# Patient Record
Sex: FEMALE | Race: WHITE | Employment: OTHER | URBAN - NONMETROPOLITAN AREA
[De-identification: names, ages, dates, MRNs, and addresses within clinical notes are randomized per-mention and may not be internally consistent; named-entity substitution may affect disease eponyms.]

---

## 2017-02-08 ENCOUNTER — TELEPHONE (OUTPATIENT)
Dept: FAMILY MEDICINE CLINIC | Age: 47
End: 2017-02-08

## 2017-04-11 DIAGNOSIS — F41.9 ANXIETY: ICD-10-CM

## 2017-04-11 RX ORDER — CITALOPRAM 20 MG/1
TABLET ORAL
Qty: 30 TABLET | Refills: 0 | Status: SHIPPED | OUTPATIENT
Start: 2017-04-11 | End: 2017-04-21 | Stop reason: SDUPTHER

## 2017-04-21 ENCOUNTER — OFFICE VISIT (OUTPATIENT)
Dept: FAMILY MEDICINE CLINIC | Age: 47
End: 2017-04-21
Payer: COMMERCIAL

## 2017-04-21 VITALS
SYSTOLIC BLOOD PRESSURE: 126 MMHG | DIASTOLIC BLOOD PRESSURE: 88 MMHG | HEIGHT: 66 IN | WEIGHT: 177 LBS | HEART RATE: 80 BPM | BODY MASS INDEX: 28.45 KG/M2

## 2017-04-21 DIAGNOSIS — Z98.84 S/P GASTRIC BYPASS: ICD-10-CM

## 2017-04-21 DIAGNOSIS — F41.9 ANXIETY: ICD-10-CM

## 2017-04-21 DIAGNOSIS — E78.5 HYPERLIPIDEMIA, UNSPECIFIED HYPERLIPIDEMIA TYPE: ICD-10-CM

## 2017-04-21 DIAGNOSIS — I10 ESSENTIAL HYPERTENSION: Primary | ICD-10-CM

## 2017-04-21 PROCEDURE — 99214 OFFICE O/P EST MOD 30 MIN: CPT | Performed by: PHYSICIAN ASSISTANT

## 2017-04-21 RX ORDER — LISINOPRIL AND HYDROCHLOROTHIAZIDE 12.5; 1 MG/1; MG/1
1 TABLET ORAL DAILY
Qty: 90 TABLET | Refills: 3 | Status: SHIPPED | OUTPATIENT
Start: 2017-04-21 | End: 2017-08-28 | Stop reason: SDUPTHER

## 2017-04-21 RX ORDER — ATORVASTATIN CALCIUM 20 MG/1
20 TABLET, FILM COATED ORAL DAILY
Qty: 30 TABLET | Refills: 11 | Status: CANCELLED | OUTPATIENT
Start: 2017-04-21

## 2017-04-21 RX ORDER — CITALOPRAM 20 MG/1
TABLET ORAL
Qty: 90 TABLET | Refills: 3 | Status: SHIPPED | OUTPATIENT
Start: 2017-04-21 | End: 2017-08-28 | Stop reason: SDUPTHER

## 2017-04-21 ASSESSMENT — PATIENT HEALTH QUESTIONNAIRE - PHQ9
1. LITTLE INTEREST OR PLEASURE IN DOING THINGS: 0
SUM OF ALL RESPONSES TO PHQ QUESTIONS 1-9: 0
SUM OF ALL RESPONSES TO PHQ9 QUESTIONS 1 & 2: 0
2. FEELING DOWN, DEPRESSED OR HOPELESS: 0

## 2017-04-22 ASSESSMENT — ENCOUNTER SYMPTOMS
BLURRED VISION: 0
SHORTNESS OF BREATH: 0

## 2017-04-28 ENCOUNTER — TELEPHONE (OUTPATIENT)
Dept: GENERAL RADIOLOGY | Age: 47
End: 2017-04-28

## 2017-04-28 DIAGNOSIS — Z12.31 VISIT FOR SCREENING MAMMOGRAM: Primary | ICD-10-CM

## 2017-05-03 ENCOUNTER — OFFICE VISIT (OUTPATIENT)
Dept: OBGYN | Age: 47
End: 2017-05-03
Payer: COMMERCIAL

## 2017-05-03 VITALS
HEART RATE: 76 BPM | BODY MASS INDEX: 27.78 KG/M2 | WEIGHT: 177 LBS | RESPIRATION RATE: 16 BRPM | SYSTOLIC BLOOD PRESSURE: 126 MMHG | HEIGHT: 67 IN | DIASTOLIC BLOOD PRESSURE: 80 MMHG

## 2017-05-03 DIAGNOSIS — Z01.419 ENCOUNTER FOR GYNECOLOGICAL EXAMINATION (GENERAL) (ROUTINE) WITHOUT ABNORMAL FINDINGS: Primary | ICD-10-CM

## 2017-05-03 PROCEDURE — 87491 CHLMYD TRACH DNA AMP PROBE: CPT

## 2017-05-03 PROCEDURE — 87591 N.GONORRHOEAE DNA AMP PROB: CPT

## 2017-05-03 PROCEDURE — 99396 PREV VISIT EST AGE 40-64: CPT | Performed by: OBSTETRICS & GYNECOLOGY

## 2017-05-05 LAB
C TRACH DNA GENITAL QL NAA+PROBE: NEGATIVE
N. GONORRHOEAE DNA: NEGATIVE

## 2017-05-07 DIAGNOSIS — R92.8 FOLLOW-UP EXAMINATION OF ABNORMAL MAMMOGRAM: Primary | ICD-10-CM

## 2017-05-26 ENCOUNTER — TELEPHONE (OUTPATIENT)
Dept: GENERAL RADIOLOGY | Age: 47
End: 2017-05-26

## 2017-06-06 ENCOUNTER — OFFICE VISIT (OUTPATIENT)
Dept: SURGERY | Age: 47
End: 2017-06-06
Payer: COMMERCIAL

## 2017-06-06 VITALS
SYSTOLIC BLOOD PRESSURE: 136 MMHG | DIASTOLIC BLOOD PRESSURE: 80 MMHG | HEIGHT: 67 IN | HEART RATE: 88 BPM | BODY MASS INDEX: 27.15 KG/M2 | WEIGHT: 173 LBS

## 2017-06-06 DIAGNOSIS — R92.8 ABNORMAL MAMMOGRAM: Primary | ICD-10-CM

## 2017-06-06 PROCEDURE — 99214 OFFICE O/P EST MOD 30 MIN: CPT | Performed by: SURGERY

## 2017-06-07 ENCOUNTER — HOSPITAL ENCOUNTER (OUTPATIENT)
Age: 47
Setting detail: SPECIMEN
Discharge: HOME | End: 2017-06-07
Payer: COMMERCIAL

## 2017-06-09 LAB — SURGICAL PATHOLOGY REPORT: NORMAL

## 2017-06-22 DIAGNOSIS — R92.8 ABNORMAL MAMMOGRAM OF RIGHT BREAST: Primary | ICD-10-CM

## 2017-07-12 ENCOUNTER — TELEPHONE (OUTPATIENT)
Dept: FAMILY MEDICINE CLINIC | Age: 47
End: 2017-07-12

## 2017-07-12 RX ORDER — TERBINAFINE HYDROCHLORIDE 250 MG/1
250 TABLET ORAL DAILY
Qty: 14 TABLET | Refills: 0 | Status: SHIPPED | OUTPATIENT
Start: 2017-07-12 | End: 2017-07-26

## 2017-08-03 ENCOUNTER — TELEPHONE (OUTPATIENT)
Dept: FAMILY MEDICINE CLINIC | Age: 47
End: 2017-08-03

## 2017-08-28 ENCOUNTER — TELEPHONE (OUTPATIENT)
Dept: FAMILY MEDICINE CLINIC | Age: 47
End: 2017-08-28

## 2017-08-28 ENCOUNTER — OFFICE VISIT (OUTPATIENT)
Dept: PRIMARY CARE CLINIC | Age: 47
End: 2017-08-28
Payer: COMMERCIAL

## 2017-08-28 VITALS
HEIGHT: 67 IN | HEART RATE: 72 BPM | SYSTOLIC BLOOD PRESSURE: 180 MMHG | DIASTOLIC BLOOD PRESSURE: 92 MMHG | BODY MASS INDEX: 27.16 KG/M2 | WEIGHT: 173.06 LBS

## 2017-08-28 DIAGNOSIS — F41.9 ANXIETY: ICD-10-CM

## 2017-08-28 DIAGNOSIS — I10 ESSENTIAL HYPERTENSION: ICD-10-CM

## 2017-08-28 DIAGNOSIS — S93.412A SPRAIN OF CALCANEOFIBULAR LIGAMENT OF LEFT ANKLE, INITIAL ENCOUNTER: Primary | ICD-10-CM

## 2017-08-28 PROCEDURE — 99214 OFFICE O/P EST MOD 30 MIN: CPT | Performed by: FAMILY MEDICINE

## 2017-08-28 RX ORDER — LISINOPRIL AND HYDROCHLOROTHIAZIDE 12.5; 1 MG/1; MG/1
1 TABLET ORAL DAILY
Qty: 90 TABLET | Refills: 3 | Status: SHIPPED | OUTPATIENT
Start: 2017-08-28 | End: 2018-10-10

## 2017-08-28 RX ORDER — CITALOPRAM 20 MG/1
TABLET ORAL
Qty: 90 TABLET | Refills: 3 | Status: SHIPPED | OUTPATIENT
Start: 2017-08-28 | End: 2018-07-17 | Stop reason: ALTCHOICE

## 2017-08-28 ASSESSMENT — ENCOUNTER SYMPTOMS
RESPIRATORY NEGATIVE: 1
GASTROINTESTINAL NEGATIVE: 1
EYES NEGATIVE: 1
ALLERGIC/IMMUNOLOGIC NEGATIVE: 1

## 2017-08-30 ENCOUNTER — TELEPHONE (OUTPATIENT)
Dept: FAMILY MEDICINE CLINIC | Age: 47
End: 2017-08-30

## 2017-09-13 ENCOUNTER — TELEPHONE (OUTPATIENT)
Dept: FAMILY MEDICINE CLINIC | Age: 47
End: 2017-09-13

## 2017-12-22 DIAGNOSIS — R92.8 ABNORMAL MAMMOGRAM OF RIGHT BREAST: Primary | ICD-10-CM

## 2018-03-30 ENCOUNTER — HOSPITAL ENCOUNTER (OUTPATIENT)
Dept: MAMMOGRAPHY | Age: 48
Discharge: HOME | End: 2018-04-01
Payer: COMMERCIAL

## 2018-03-30 DIAGNOSIS — R92.8 ABNORMAL MAMMOGRAM OF RIGHT BREAST: ICD-10-CM

## 2018-03-30 PROCEDURE — 77065 DX MAMMO INCL CAD UNI: CPT

## 2018-04-30 ENCOUNTER — TELEPHONE (OUTPATIENT)
Dept: PRIMARY CARE CLINIC | Age: 48
End: 2018-04-30

## 2018-07-16 ENCOUNTER — TELEPHONE (OUTPATIENT)
Dept: FAMILY MEDICINE CLINIC | Age: 48
End: 2018-07-16

## 2018-07-16 NOTE — TELEPHONE ENCOUNTER
Needs hospital follow up. Went into work today and could not handle it was to [employer] did not give ant time off
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
## 2018-07-17 ENCOUNTER — OFFICE VISIT (OUTPATIENT)
Dept: FAMILY MEDICINE CLINIC | Age: 48
End: 2018-07-17
Payer: COMMERCIAL

## 2018-07-17 ENCOUNTER — HOSPITAL ENCOUNTER (OUTPATIENT)
Dept: LAB | Age: 48
Setting detail: SPECIMEN
Discharge: HOME | End: 2018-07-17
Payer: COMMERCIAL

## 2018-07-17 VITALS
BODY MASS INDEX: 24.96 KG/M2 | DIASTOLIC BLOOD PRESSURE: 80 MMHG | TEMPERATURE: 97 F | OXYGEN SATURATION: 98 % | HEIGHT: 67 IN | WEIGHT: 159 LBS | HEART RATE: 80 BPM | SYSTOLIC BLOOD PRESSURE: 110 MMHG

## 2018-07-17 DIAGNOSIS — J18.9 PNEUMONIA DUE TO INFECTIOUS ORGANISM, UNSPECIFIED LATERALITY, UNSPECIFIED PART OF LUNG: Primary | ICD-10-CM

## 2018-07-17 DIAGNOSIS — J18.9 PNEUMONIA DUE TO INFECTIOUS ORGANISM, UNSPECIFIED LATERALITY, UNSPECIFIED PART OF LUNG: ICD-10-CM

## 2018-07-17 DIAGNOSIS — I48.0 PAROXYSMAL ATRIAL FIBRILLATION (HCC): ICD-10-CM

## 2018-07-17 LAB
ABSOLUTE EOS #: 0 K/UL (ref 0–0.4)
ABSOLUTE IMMATURE GRANULOCYTE: ABNORMAL K/UL (ref 0–0.3)
ABSOLUTE LYMPH #: 2.2 K/UL (ref 1–4.8)
ABSOLUTE MONO #: 1 K/UL (ref 0.1–1.2)
ANION GAP SERPL CALCULATED.3IONS-SCNC: 12 MMOL/L (ref 9–17)
BASOPHILS # BLD: 1 % (ref 0–1)
BASOPHILS ABSOLUTE: 0.1 K/UL (ref 0–0.2)
BUN BLDV-MCNC: 10 MG/DL (ref 6–20)
BUN/CREAT BLD: 14 (ref 9–20)
CALCIUM SERPL-MCNC: 9.6 MG/DL (ref 8.6–10.4)
CHLORIDE BLD-SCNC: 97 MMOL/L (ref 98–107)
CO2: 27 MMOL/L (ref 20–31)
CREAT SERPL-MCNC: 0.71 MG/DL (ref 0.5–0.9)
DIFFERENTIAL TYPE: ABNORMAL
EOSINOPHILS RELATIVE PERCENT: 1 % (ref 1–7)
GFR AFRICAN AMERICAN: >60 ML/MIN
GFR NON-AFRICAN AMERICAN: >60 ML/MIN
GFR SERPL CREATININE-BSD FRML MDRD: ABNORMAL ML/MIN/{1.73_M2}
GFR SERPL CREATININE-BSD FRML MDRD: ABNORMAL ML/MIN/{1.73_M2}
GLUCOSE BLD-MCNC: 102 MG/DL (ref 70–99)
HCT VFR BLD CALC: 38.4 % (ref 36–46)
HEMOGLOBIN: 12.9 G/DL (ref 12–16)
IMMATURE GRANULOCYTES: ABNORMAL %
LYMPHOCYTES # BLD: 27 % (ref 16–46)
MCH RBC QN AUTO: 32.7 PG (ref 26–34)
MCHC RBC AUTO-ENTMCNC: 33.7 G/DL (ref 31–37)
MCV RBC AUTO: 96.9 FL (ref 80–100)
MONOCYTES # BLD: 12 % (ref 4–11)
NRBC AUTOMATED: ABNORMAL PER 100 WBC
PDW BLD-RTO: 14.6 % (ref 11–14.5)
PLATELET # BLD: 496 K/UL (ref 140–450)
PLATELET ESTIMATE: ABNORMAL
PMV BLD AUTO: 7.7 FL (ref 6–12)
POTASSIUM SERPL-SCNC: 4.4 MMOL/L (ref 3.7–5.3)
RBC # BLD: 3.96 M/UL (ref 4–5.2)
RBC # BLD: ABNORMAL 10*6/UL
SEG NEUTROPHILS: 59 % (ref 43–77)
SEGMENTED NEUTROPHILS ABSOLUTE COUNT: 4.8 K/UL (ref 1.8–7.7)
SODIUM BLD-SCNC: 136 MMOL/L (ref 135–144)
WBC # BLD: 8.1 K/UL (ref 3.5–11)
WBC # BLD: ABNORMAL 10*3/UL

## 2018-07-17 PROCEDURE — 85025 COMPLETE CBC W/AUTO DIFF WBC: CPT

## 2018-07-17 PROCEDURE — 80048 BASIC METABOLIC PNL TOTAL CA: CPT

## 2018-07-17 PROCEDURE — 36415 COLL VENOUS BLD VENIPUNCTURE: CPT

## 2018-07-17 PROCEDURE — G0444 DEPRESSION SCREEN ANNUAL: HCPCS | Performed by: PHYSICIAN ASSISTANT

## 2018-07-17 PROCEDURE — 99495 TRANSJ CARE MGMT MOD F2F 14D: CPT | Performed by: PHYSICIAN ASSISTANT

## 2018-07-17 PROCEDURE — 1111F DSCHRG MED/CURRENT MED MERGE: CPT | Performed by: PHYSICIAN ASSISTANT

## 2018-07-17 RX ORDER — CARVEDILOL 6.25 MG/1
6.25 TABLET ORAL 2 TIMES DAILY WITH MEALS
COMMUNITY
Start: 2018-07-13 | End: 2020-11-06

## 2018-07-17 RX ORDER — FLUCONAZOLE 150 MG/1
TABLET ORAL
Qty: 2 TABLET | Refills: 2 | Status: SHIPPED | OUTPATIENT
Start: 2018-07-17 | End: 2018-08-23 | Stop reason: SDUPTHER

## 2018-07-17 RX ORDER — APIXABAN 5 MG/1
TABLET, FILM COATED ORAL
Refills: 0 | COMMUNITY
Start: 2018-07-13 | End: 2019-10-07 | Stop reason: ALTCHOICE

## 2018-07-17 RX ORDER — LISINOPRIL 10 MG/1
10 TABLET ORAL
Refills: 0 | COMMUNITY
Start: 2018-07-13 | End: 2019-07-08 | Stop reason: SDUPTHER

## 2018-07-17 RX ORDER — LEVALBUTEROL INHALATION SOLUTION 0.63 MG/3ML
0.63 SOLUTION RESPIRATORY (INHALATION)
COMMUNITY
Start: 2018-07-13 | End: 2018-10-10

## 2018-07-17 RX ORDER — LEVOFLOXACIN 500 MG/1
750 TABLET, FILM COATED ORAL
COMMUNITY
Start: 2018-07-13 | End: 2018-07-22

## 2018-07-17 ASSESSMENT — PATIENT HEALTH QUESTIONNAIRE - PHQ9
1. LITTLE INTEREST OR PLEASURE IN DOING THINGS: 2
2. FEELING DOWN, DEPRESSED OR HOPELESS: 2
3. TROUBLE FALLING OR STAYING ASLEEP: 2
4. FEELING TIRED OR HAVING LITTLE ENERGY: 3
7. TROUBLE CONCENTRATING ON THINGS, SUCH AS READING THE NEWSPAPER OR WATCHING TELEVISION: 0
SUM OF ALL RESPONSES TO PHQ9 QUESTIONS 1 & 2: 4
8. MOVING OR SPEAKING SO SLOWLY THAT OTHER PEOPLE COULD HAVE NOTICED. OR THE OPPOSITE, BEING SO FIGETY OR RESTLESS THAT YOU HAVE BEEN MOVING AROUND A LOT MORE THAN USUAL: 0
6. FEELING BAD ABOUT YOURSELF - OR THAT YOU ARE A FAILURE OR HAVE LET YOURSELF OR YOUR FAMILY DOWN: 0
9. THOUGHTS THAT YOU WOULD BE BETTER OFF DEAD, OR OF HURTING YOURSELF: 0
SUM OF ALL RESPONSES TO PHQ QUESTIONS 1-9: 11
5. POOR APPETITE OR OVEREATING: 2
10. IF YOU CHECKED OFF ANY PROBLEMS, HOW DIFFICULT HAVE THESE PROBLEMS MADE IT FOR YOU TO DO YOUR WORK, TAKE CARE OF THINGS AT HOME, OR GET ALONG WITH OTHER PEOPLE: 2

## 2018-07-17 NOTE — PROGRESS NOTES
Post-Discharge Transitional Care Management Services      Dada Galarza   YOB: 1970    Date of Office Visit:  7/17/2018  Date of Hospital Admission: 7/4/18  Date of Hospital Discharge: 7/13/18      Care management risk score Rising risk (score 2-5) and Complex Care (Scores >=6): 1       Patient Active Problem List   Diagnosis    Sigmoid diverticulitis    Hypertension    Hyperlipemia    GERD (gastroesophageal reflux disease)    S/P gastric bypass    Anxiety       Allergies   Allergen Reactions    Flagyl [Metronidazole] Nausea Only       Medications listed as ordered at the time of discharge from hospital      Medications marked \"taking\" at this time  Outpatient Prescriptions Marked as Taking for the 7/17/18 encounter (Office Visit) with BHASKAR Zapata   Medication Sig Dispense Refill    levofloxacin (LEVAQUIN) 500 MG tablet Take 750 mg by mouth      lisinopril (PRINIVIL;ZESTRIL) 10 MG tablet Take 10 mg by mouth  0    levalbuterol (XOPENEX) 0.63 MG/3ML nebulization Inhale 0.63 mg into the lungs      ipratropium (ATROVENT) 0.02 % nebulizer solution USE 1 AMPULE IN NEBULIZER THREE TIMES A DAY for 14 days only  0    ELIQUIS 5 MG TABS tablet TAKE 1 TABLET BY MOUTH TWO TIMES A DAY  0    carvedilol (COREG) 6.25 MG tablet Take 6.25 mg by mouth 2 times daily (with meals)       multivitamin (ANIMAL SHAPES) WITH C & FA CHEW chewable tablet Take 1 tablet by mouth daily      Ascorbic Acid (VITAMIN C) 500 MG CAPS Take 1 tablet by mouth daily          Medications patient taking as of now reconciled against medications ordered at time of hospital discharge: Yes    Vitals:    07/17/18 1451   BP: 110/80   Site: Right Arm   Position: Sitting   Cuff Size: Medium Adult   Pulse: 80   Temp: 97 °F (36.1 °C)   TempSrc: Tympanic   SpO2: 98%   Weight: 159 lb (72.1 kg)   Height: 5' 7\" (1.702 m)     Body mass index is 24.9 kg/m².      Wt Readings from Last 3 Encounters:   07/17/18 159 lb (72.1 kg) 08/28/17 173 lb 1 oz (78.5 kg)   06/06/17 173 lb (78.5 kg)     BP Readings from Last 3 Encounters:   07/17/18 110/80   08/28/17 (!) 180/92   06/06/17 136/80        Inpatient course: Discharge summary reviewed- see chart. Chief Complaint   Patient presents with    Follow-Up from Hospital       HPI   Patient is seen in the office for hospital follow-up. Patient was admitted to [facility] 7/4/18-7/8/18 for pneumonia. Patient did not respond to antibiotic therapy. She was intubated and transferred to [facility]. She was in ICU 7/8/18-7/13/18. She extubated on 7/11/18. She had cultures positive for Legionella. She is currently taking Levaquin. Patient admits to significant weakness. She continues to cough. She is compliant with medications. She is trying to maintain incentive spirometry. She developed paroxysmal atrial fibrillation during hospitalization. She is currently taking Coreg and Eliquis. She was changed to plain lisinopril. She is scheduled for follow-up with cardiology next week. She is needing a work note. She denies any additional concerns or complaints today.     Review of Systems   Review of Systems - General ROS: positive for  - fatigue  Psychological ROS: negative  Respiratory ROS: positive for - cough and shortness of breath  Cardiovascular ROS: no chest pain or dyspnea on exertion  Gastrointestinal ROS: no abdominal pain, change in bowel habits, or black or bloody stools    Non face to face  following discharge, date last encounter closed (first attempt may have been earlier): *No documented post hospital discharge outreach found in the last 14 days     Call initiated 2 business days of discharge: *No response recorded in the last 14 days     Interval history/Current status: reviewed      Physical Exam  NC, AT  PERRL  TMs pearly gray  Pharynx moist  RRR  Decreased breath sounds right base  Soft, NT, BS x 4  Extremities without cyanosis or edema      Assessment/Plan:  Lacie Sandhu was seen today for follow-up from hospital.    Diagnoses and all orders for this visit:    Pneumonia due to infectious organism, unspecified laterality, unspecified part of lung  -     WI DISCHARGE MEDS RECONCILED W/ CURRENT OUTPATIENT MED LIST  -     XR CHEST STANDARD (2 VW); Future  -     Basic Metabolic Panel; Future  -     CBC Auto Differential; Future    Paroxysmal atrial fibrillation (HCC)  -     WI DISCHARGE MEDS RECONCILED W/ CURRENT OUTPATIENT MED LIST  -     XR CHEST STANDARD (2 VW); Future  -     Basic Metabolic Panel; Future  -     CBC Auto Differential; Future    Other orders  -     fluconazole (DIFLUCAN) 150 MG tablet; Take 1 po now. Repeat in 72 hours PRN vaginitis.           Medical Decision Making: moderate complexity

## 2018-07-17 NOTE — LETTER
[facility] [address] [place] [address]  Phone: [phone]  Fax: [address] [place], [place]        July 17, 2018     Patient: Carl Ennis   YOB: 1970   Date of Visit: 7/17/2018       To Whom It May Concern: It is my medical opinion that Patricia Zamora may return to work on 7/30/18. If you have any questions or concerns, please don't hesitate to call.     Sincerely,        BHASKAR Shankar

## 2018-07-23 ENCOUNTER — HOSPITAL ENCOUNTER (OUTPATIENT)
Dept: GENERAL RADIOLOGY | Age: 48
Discharge: HOME | End: 2018-07-25
Payer: COMMERCIAL

## 2018-07-23 DIAGNOSIS — I48.0 PAROXYSMAL ATRIAL FIBRILLATION (HCC): ICD-10-CM

## 2018-07-23 DIAGNOSIS — J18.9 PNEUMONIA DUE TO INFECTIOUS ORGANISM, UNSPECIFIED LATERALITY, UNSPECIFIED PART OF LUNG: ICD-10-CM

## 2018-07-23 PROCEDURE — 71046 X-RAY EXAM CHEST 2 VIEWS: CPT

## 2018-07-24 DIAGNOSIS — J18.9 PNEUMONIA DUE TO INFECTIOUS ORGANISM, UNSPECIFIED LATERALITY, UNSPECIFIED PART OF LUNG: Primary | ICD-10-CM

## 2018-08-06 ENCOUNTER — TELEPHONE (OUTPATIENT)
Dept: FAMILY MEDICINE CLINIC | Age: 48
End: 2018-08-06

## 2018-08-06 NOTE — TELEPHONE ENCOUNTER
Wanted [facility] to fill out biometric form for work byt unable to do as we do not have recent blood work

## 2018-08-08 ENCOUNTER — HOSPITAL ENCOUNTER (OUTPATIENT)
Dept: GENERAL RADIOLOGY | Age: 48
Discharge: HOME | End: 2018-08-10
Payer: COMMERCIAL

## 2018-08-08 ENCOUNTER — TELEPHONE (OUTPATIENT)
Dept: FAMILY MEDICINE CLINIC | Age: 48
End: 2018-08-08

## 2018-08-08 ENCOUNTER — TELEPHONE (OUTPATIENT)
Dept: PRIMARY CARE CLINIC | Age: 48
End: 2018-08-08

## 2018-08-08 DIAGNOSIS — J18.9 PNEUMONIA DUE TO INFECTIOUS ORGANISM, UNSPECIFIED LATERALITY, UNSPECIFIED PART OF LUNG: ICD-10-CM

## 2018-08-08 PROCEDURE — 71046 X-RAY EXAM CHEST 2 VIEWS: CPT

## 2018-08-10 ENCOUNTER — OFFICE VISIT (OUTPATIENT)
Dept: PRIMARY CARE CLINIC | Age: 48
End: 2018-08-10
Payer: COMMERCIAL

## 2018-08-10 VITALS
HEIGHT: 67 IN | TEMPERATURE: 98.6 F | BODY MASS INDEX: 25.27 KG/M2 | OXYGEN SATURATION: 100 % | SYSTOLIC BLOOD PRESSURE: 122 MMHG | DIASTOLIC BLOOD PRESSURE: 78 MMHG | HEART RATE: 100 BPM | WEIGHT: 161 LBS

## 2018-08-10 DIAGNOSIS — H10.32 ACUTE BACTERIAL CONJUNCTIVITIS OF LEFT EYE: Primary | ICD-10-CM

## 2018-08-10 PROCEDURE — 99213 OFFICE O/P EST LOW 20 MIN: CPT | Performed by: PHYSICIAN ASSISTANT

## 2018-08-10 RX ORDER — MOXIFLOXACIN 5 MG/ML
1 SOLUTION/ DROPS OPHTHALMIC 3 TIMES DAILY
Qty: 1 BOTTLE | Refills: 0 | Status: SHIPPED | OUTPATIENT
Start: 2018-08-10 | End: 2018-08-17

## 2018-08-10 ASSESSMENT — ENCOUNTER SYMPTOMS
EYE REDNESS: 1
BLURRED VISION: 0
EYE DISCHARGE: 1
RESPIRATORY NEGATIVE: 1

## 2018-08-10 NOTE — PROGRESS NOTES
Subjective:      Patient ID: Stacey Vivar is a 49 y.o. female. Eye Problem    The left eye is affected. This is a new problem. The current episode started today. There is no known exposure to pink eye. She does not wear contacts. Associated symptoms include an eye discharge and eye redness. Pertinent negatives include no blurred vision. Review of Systems   HENT: Negative. Eyes: Positive for discharge and redness. Negative for blurred vision. Respiratory: Negative. Cardiovascular: Negative. Objective:   Physical Exam   Constitutional: She is oriented to person, place, and time. She appears well-developed. HENT:   Head: Normocephalic. Right Ear: External ear normal.   Left Ear: External ear normal.   Eyes: Pupils are equal, round, and reactive to light. Left conjunctiva is injected. Neck: Neck supple. Cardiovascular: Normal rate and regular rhythm. Pulmonary/Chest: Effort normal and breath sounds normal.   Neurological: She is alert and oriented to person, place, and time. Psychiatric: She has a normal mood and affect. Assessment:      1. Acute bacterial conjunctivitis of left eye          Plan:      Vigamox drops. Cool compresses. Good hygiene. Supportive care. Follow-up PRN/as planned with PCP.         BHASKAR Plummer

## 2018-08-23 ENCOUNTER — TELEPHONE (OUTPATIENT)
Dept: FAMILY MEDICINE CLINIC | Age: 48
End: 2018-08-23

## 2018-08-23 RX ORDER — FLUCONAZOLE 150 MG/1
TABLET ORAL
Qty: 2 TABLET | Refills: 2 | Status: SHIPPED | OUTPATIENT
Start: 2018-08-23 | End: 2018-10-10 | Stop reason: SDUPTHER

## 2018-08-23 NOTE — TELEPHONE ENCOUNTER
Pt calling stating she was in the hospital previously on a lot of antibiotics and subsequently had a yeast infection and [facility] had given pt diflucan, pt calling again today stating she has another yeast infection and is requesting another script to be sent to above loaded pharmacy, please advise at above number.

## 2018-10-09 ENCOUNTER — TELEPHONE (OUTPATIENT)
Dept: FAMILY MEDICINE CLINIC | Age: 48
End: 2018-10-09

## 2018-10-10 ENCOUNTER — OFFICE VISIT (OUTPATIENT)
Dept: PRIMARY CARE CLINIC | Age: 48
End: 2018-10-10
Payer: COMMERCIAL

## 2018-10-10 VITALS
RESPIRATION RATE: 16 BRPM | DIASTOLIC BLOOD PRESSURE: 94 MMHG | TEMPERATURE: 96.9 F | HEIGHT: 68 IN | WEIGHT: 170 LBS | HEART RATE: 74 BPM | SYSTOLIC BLOOD PRESSURE: 130 MMHG | OXYGEN SATURATION: 98 % | BODY MASS INDEX: 25.76 KG/M2

## 2018-10-10 DIAGNOSIS — M25.561 PAIN AND SWELLING OF RIGHT KNEE: Primary | ICD-10-CM

## 2018-10-10 DIAGNOSIS — M25.461 PAIN AND SWELLING OF RIGHT KNEE: Primary | ICD-10-CM

## 2018-10-10 PROCEDURE — 99213 OFFICE O/P EST LOW 20 MIN: CPT | Performed by: NURSE PRACTITIONER

## 2018-10-10 RX ORDER — FLUCONAZOLE 150 MG/1
TABLET ORAL
Qty: 3 TABLET | Refills: 0 | Status: SHIPPED | OUTPATIENT
Start: 2018-10-10 | End: 2019-10-07 | Stop reason: ALTCHOICE

## 2018-10-10 RX ORDER — CEPHALEXIN 500 MG/1
500 CAPSULE ORAL 3 TIMES DAILY
Qty: 30 CAPSULE | Refills: 0 | Status: SHIPPED | OUTPATIENT
Start: 2018-10-10 | End: 2019-10-07 | Stop reason: ALTCHOICE

## 2018-10-10 RX ORDER — PREDNISONE 20 MG/1
20 TABLET ORAL 2 TIMES DAILY
Qty: 10 TABLET | Refills: 0 | Status: SHIPPED | OUTPATIENT
Start: 2018-10-10 | End: 2018-10-15

## 2018-10-10 ASSESSMENT — PATIENT HEALTH QUESTIONNAIRE - PHQ9
2. FEELING DOWN, DEPRESSED OR HOPELESS: 0
1. LITTLE INTEREST OR PLEASURE IN DOING THINGS: 0
SUM OF ALL RESPONSES TO PHQ QUESTIONS 1-9: 0
SUM OF ALL RESPONSES TO PHQ9 QUESTIONS 1 & 2: 0
SUM OF ALL RESPONSES TO PHQ QUESTIONS 1-9: 0

## 2018-10-10 ASSESSMENT — ENCOUNTER SYMPTOMS
CONSTIPATION: 0
CHEST TIGHTNESS: 0
VOMITING: 0
COUGH: 0
DIARRHEA: 0
TROUBLE SWALLOWING: 0
NAUSEA: 0
EYES NEGATIVE: 1
ALLERGIC/IMMUNOLOGIC NEGATIVE: 1
ABDOMINAL PAIN: 0
SINUS PRESSURE: 0
SHORTNESS OF BREATH: 0

## 2018-10-19 ENCOUNTER — TELEPHONE (OUTPATIENT)
Dept: MAMMOGRAPHY | Age: 48
End: 2018-10-19

## 2018-10-19 DIAGNOSIS — Z12.31 VISIT FOR SCREENING MAMMOGRAM: Primary | ICD-10-CM

## 2018-10-23 ENCOUNTER — OFFICE VISIT (OUTPATIENT)
Dept: OBGYN | Age: 48
End: 2018-10-23
Payer: COMMERCIAL

## 2018-10-23 ENCOUNTER — HOSPITAL ENCOUNTER (OUTPATIENT)
Age: 48
Setting detail: SPECIMEN
Discharge: HOME | End: 2018-10-23
Payer: COMMERCIAL

## 2018-10-23 ENCOUNTER — HOSPITAL ENCOUNTER (OUTPATIENT)
Dept: MAMMOGRAPHY | Age: 48
Discharge: HOME | End: 2018-10-25
Payer: COMMERCIAL

## 2018-10-23 VITALS
TEMPERATURE: 97.4 F | WEIGHT: 171 LBS | HEART RATE: 85 BPM | SYSTOLIC BLOOD PRESSURE: 110 MMHG | DIASTOLIC BLOOD PRESSURE: 80 MMHG | HEIGHT: 68 IN | BODY MASS INDEX: 25.91 KG/M2

## 2018-10-23 DIAGNOSIS — Z12.31 VISIT FOR SCREENING MAMMOGRAM: ICD-10-CM

## 2018-10-23 DIAGNOSIS — Z12.4 SCREENING FOR CERVICAL CANCER: ICD-10-CM

## 2018-10-23 DIAGNOSIS — Z01.419 WELL FEMALE EXAM WITH ROUTINE GYNECOLOGICAL EXAM: Primary | ICD-10-CM

## 2018-10-23 DIAGNOSIS — N89.8 VAGINAL IRRITATION: ICD-10-CM

## 2018-10-23 PROCEDURE — 87480 CANDIDA DNA DIR PROBE: CPT

## 2018-10-23 PROCEDURE — 99396 PREV VISIT EST AGE 40-64: CPT | Performed by: NURSE PRACTITIONER

## 2018-10-23 PROCEDURE — 87510 GARDNER VAG DNA DIR PROBE: CPT

## 2018-10-23 PROCEDURE — G0145 SCR C/V CYTO,THINLAYER,RESCR: HCPCS

## 2018-10-23 PROCEDURE — 77063 BREAST TOMOSYNTHESIS BI: CPT

## 2018-10-23 PROCEDURE — 87660 TRICHOMONAS VAGIN DIR PROBE: CPT

## 2018-10-23 NOTE — PROGRESS NOTES
SURGERY      TUBAL LIGATION Bilateral 2009    WISDOM TOOTH EXTRACTION       Family History   Problem Relation Age of Onset    Endometrial Cancer Mother     Diabetes Father     High Blood Pressure Brother      Social History     Social History    Marital status:      Spouse name: N/A    Number of children: N/A    Years of education: N/A     Occupational History     [address]     Social History Main Topics    Smoking status: Never Smoker    Smokeless tobacco: Never Used    Alcohol use 1.8 oz/week     3 Glasses of wine per week      Comment: occasional    Drug use: No    Sexual activity: Yes     Partners: Male      Comment:  8 years. NO dyspareunia or PCB     Other Topics Concern    Not on file     Social History Narrative    No narrative on file       MEDICATIONS:  Current Outpatient Prescriptions   Medication Sig Dispense Refill    cephALEXin (KEFLEX) 500 MG capsule Take 1 capsule by mouth 3 times daily 30 capsule 0    fluconazole (DIFLUCAN) 150 MG tablet Take one table every 2 days for 3 doses while on ATB 3 tablet 0    lisinopril (PRINIVIL;ZESTRIL) 10 MG tablet Take 10 mg by mouth  0    BIOTIN 5000 PO Take 1 tablet by mouth daily      Probiotic Product (PROBIOTIC DAILY PO) Take 1 tablet by mouth daily      multivitamin (ANIMAL SHAPES) WITH C & FA CHEW chewable tablet Take 1 tablet by mouth daily      Ascorbic Acid (VITAMIN C) 500 MG CAPS Take 1 tablet by mouth daily      CALCIUM CITRATE-VITAMIN D3 PO Take 1 tablet by mouth 2 times daily      ELIQUIS 5 MG TABS tablet TAKE 1 TABLET BY MOUTH TWO TIMES A DAY  0    carvedilol (COREG) 6.25 MG tablet Take 6.25 mg by mouth 2 times daily (with meals)        No current facility-administered medications for this visit.         ALLERGIES:  Allergies as of 10/23/2018 - Review Complete 10/23/2018   Allergen Reaction Noted    Codeine Anxiety 11/26/2013    Flagyl [metronidazole] Nausea Only 07/03/2014           Gynecologic for screening mammogram  TYLER DIGITAL SCREEN W CAD BILATERAL   4. Vaginal irritation  VAGINITIS DNA PROBE        Chief Complaint   Patient presents with    Gynecologic Exam     Pap/pelvic          Past Medical History:   Diagnosis Date    Anxiety     GERD (gastroesophageal reflux disease)     History of pneumonia 07/2018    Legionella requiring intubation    Hyperlipemia     Hypertension     Obesity     Paroxysmal atrial fibrillation ([facility] [address] 07/2018    during pneumonia hospitalization         Patient Active Problem List   Diagnosis    Sigmoid diverticulitis    Hypertension    Hyperlipemia    GERD (gastroesophageal reflux disease)    S/P gastric bypass    Anxiety          Hereditary Breast, Ovarian, Colon and Uterine Cancer screening Done. Tobacco & Secondary smoke risks reviewed; instructed on cessation and avoidance    PLAN:  Return in about 1 year (around 10/23/2019) for Annual Exam.  Repeat pap per ASCCP 2013 guidelines  Return for annual exams  Mammograms every 1 year. If 34 yo and last mammogram was negative. Routine health maintenance per patients PCP. Orders Placed This Encounter   Procedures    VAGINITIS DNA PROBE     Standing Status:   Future     Standing Expiration Date:   11/23/2018    TYLER DIGITAL SCREEN W CAD BILATERAL     Standing Status:   Future     Standing Expiration Date:   6/14/2020     Scheduling Instructions: On the day of the exam, the patient should not wear deodorant, lotion, or powder on the breast or underarm area. Wear a two piece outfit and be prepared to give information about prior breast procedures including mammograms, biopsies, or surgeries. If you've had mammograms done elsewhere, please request any previous mammogram films from those organizations prior to your appointment.      Order Specific Question:   Reason for exam:     Answer:   SCREENING MAMMOGRAM    PAP SMEAR     Standing Status:   Future     Standing Expiration Date:   12/23/2018

## 2018-10-24 LAB
DIRECT EXAM: ABNORMAL
Lab: ABNORMAL
SPECIMEN DESCRIPTION: ABNORMAL
STATUS: ABNORMAL

## 2018-10-25 DIAGNOSIS — B96.89 BV (BACTERIAL VAGINOSIS): Primary | ICD-10-CM

## 2018-10-25 DIAGNOSIS — N76.0 BV (BACTERIAL VAGINOSIS): Primary | ICD-10-CM

## 2018-10-25 RX ORDER — CLINDAMYCIN PHOSPHATE 20 MG/G
CREAM VAGINAL
Qty: 1 TUBE | Refills: 0 | Status: SHIPPED | OUTPATIENT
Start: 2018-10-25 | End: 2019-10-07 | Stop reason: ALTCHOICE

## 2018-11-08 LAB — CYTOLOGY REPORT: NORMAL

## 2019-07-08 DIAGNOSIS — I10 ESSENTIAL HYPERTENSION: Primary | ICD-10-CM

## 2019-07-08 RX ORDER — LISINOPRIL 10 MG/1
10 TABLET ORAL DAILY
Qty: 30 TABLET | Refills: 0 | Status: SHIPPED | OUTPATIENT
Start: 2019-07-08 | End: 2019-07-10 | Stop reason: SDUPTHER

## 2019-07-10 DIAGNOSIS — I10 ESSENTIAL HYPERTENSION: ICD-10-CM

## 2019-07-10 RX ORDER — LISINOPRIL 10 MG/1
10 TABLET ORAL DAILY
Qty: 30 TABLET | Refills: 0 | Status: SHIPPED | OUTPATIENT
Start: 2019-07-10 | End: 2019-08-27 | Stop reason: SDUPTHER

## 2019-08-13 ENCOUNTER — TELEPHONE (OUTPATIENT)
Dept: FAMILY MEDICINE CLINIC | Age: 49
End: 2019-08-13

## 2019-08-13 DIAGNOSIS — Z00.00 WELL ADULT EXAM: ICD-10-CM

## 2019-08-13 DIAGNOSIS — Z13.29 SCREENING FOR THYROID DISORDER: ICD-10-CM

## 2019-08-13 DIAGNOSIS — Z13.220 SCREENING, LIPID: Primary | ICD-10-CM

## 2019-08-27 ENCOUNTER — TELEPHONE (OUTPATIENT)
Dept: FAMILY MEDICINE CLINIC | Age: 49
End: 2019-08-27

## 2019-08-27 DIAGNOSIS — I10 ESSENTIAL HYPERTENSION: ICD-10-CM

## 2019-08-27 RX ORDER — LISINOPRIL 10 MG/1
10 TABLET ORAL DAILY
Qty: 30 TABLET | Refills: 1 | Status: SHIPPED | OUTPATIENT
Start: 2019-08-27 | End: 2019-10-07 | Stop reason: ALTCHOICE

## 2019-08-27 NOTE — TELEPHONE ENCOUNTER
Pt calling stating she has an appt on Friday, did not get her labs done last Saturday as she was sick, offered to pt to fax her lab orders since she states she works in [employer] and can't get here to do labs prior to appt, do you still want pt to keep appt Friday or reschedule, please advise at above number.

## 2019-09-28 ENCOUNTER — HOSPITAL ENCOUNTER (OUTPATIENT)
Dept: LAB | Age: 49
Discharge: HOME | End: 2019-09-28
Payer: COMMERCIAL

## 2019-09-28 DIAGNOSIS — Z13.220 SCREENING, LIPID: ICD-10-CM

## 2019-09-28 DIAGNOSIS — Z00.00 WELL ADULT EXAM: ICD-10-CM

## 2019-09-28 DIAGNOSIS — Z13.29 SCREENING FOR THYROID DISORDER: ICD-10-CM

## 2019-09-28 LAB
ALBUMIN SERPL-MCNC: 4.8 G/DL (ref 3.5–5.2)
ALBUMIN/GLOBULIN RATIO: 1.8 (ref 1–2.5)
ALP BLD-CCNC: 45 U/L (ref 35–104)
ALT SERPL-CCNC: 31 U/L (ref 5–33)
ANION GAP SERPL CALCULATED.3IONS-SCNC: 13 MMOL/L (ref 9–17)
AST SERPL-CCNC: 60 U/L
BILIRUB SERPL-MCNC: 0.59 MG/DL (ref 0.3–1.2)
BUN BLDV-MCNC: 4 MG/DL (ref 6–20)
BUN/CREAT BLD: 7 (ref 9–20)
CALCIUM SERPL-MCNC: 9.3 MG/DL (ref 8.6–10.4)
CHLORIDE BLD-SCNC: 93 MMOL/L (ref 98–107)
CHOLESTEROL/HDL RATIO: 1.9
CHOLESTEROL: 243 MG/DL
CO2: 25 MMOL/L (ref 20–31)
CREAT SERPL-MCNC: 0.55 MG/DL (ref 0.5–0.9)
GFR AFRICAN AMERICAN: >60 ML/MIN
GFR NON-AFRICAN AMERICAN: >60 ML/MIN
GFR SERPL CREATININE-BSD FRML MDRD: ABNORMAL ML/MIN/{1.73_M2}
GFR SERPL CREATININE-BSD FRML MDRD: ABNORMAL ML/MIN/{1.73_M2}
GLUCOSE BLD-MCNC: 95 MG/DL (ref 70–99)
HDLC SERPL-MCNC: 127 MG/DL
LDL CHOLESTEROL: 104 MG/DL (ref 0–130)
POTASSIUM SERPL-SCNC: 4.2 MMOL/L (ref 3.7–5.3)
SODIUM BLD-SCNC: 131 MMOL/L (ref 135–144)
TOTAL PROTEIN: 7.5 G/DL (ref 6.4–8.3)
TRIGL SERPL-MCNC: 58 MG/DL
TSH SERPL DL<=0.05 MIU/L-ACNC: 0.94 MIU/L (ref 0.3–5)
VLDLC SERPL CALC-MCNC: ABNORMAL MG/DL (ref 1–30)

## 2019-09-28 PROCEDURE — 80061 LIPID PANEL: CPT

## 2019-09-28 PROCEDURE — 36415 COLL VENOUS BLD VENIPUNCTURE: CPT

## 2019-09-28 PROCEDURE — 80053 COMPREHEN METABOLIC PANEL: CPT

## 2019-09-28 PROCEDURE — 84443 ASSAY THYROID STIM HORMONE: CPT

## 2019-09-30 ENCOUNTER — TELEPHONE (OUTPATIENT)
Dept: PRIMARY CARE CLINIC | Age: 49
End: 2019-09-30

## 2019-09-30 DIAGNOSIS — I10 ESSENTIAL HYPERTENSION: ICD-10-CM

## 2019-09-30 RX ORDER — LISINOPRIL 10 MG/1
10 TABLET ORAL DAILY
Qty: 30 TABLET | Refills: 1 | OUTPATIENT
Start: 2019-09-30

## 2019-09-30 NOTE — TELEPHONE ENCOUNTER
----- Message from Sergey Blanca, [place] sent at 9/30/2019  8:47 AM EDT -----  Sodium minimally low. Encourage daily Gatorade. Normal thyroid studies. Stable lipids.

## 2019-10-07 ENCOUNTER — OFFICE VISIT (OUTPATIENT)
Dept: FAMILY MEDICINE CLINIC | Age: 49
End: 2019-10-07
Payer: COMMERCIAL

## 2019-10-07 VITALS
HEIGHT: 68 IN | DIASTOLIC BLOOD PRESSURE: 98 MMHG | WEIGHT: 172 LBS | HEART RATE: 76 BPM | BODY MASS INDEX: 26.07 KG/M2 | SYSTOLIC BLOOD PRESSURE: 152 MMHG

## 2019-10-07 DIAGNOSIS — I10 ESSENTIAL HYPERTENSION: ICD-10-CM

## 2019-10-07 PROCEDURE — 99213 OFFICE O/P EST LOW 20 MIN: CPT | Performed by: PHYSICIAN ASSISTANT

## 2019-10-07 RX ORDER — LISINOPRIL AND HYDROCHLOROTHIAZIDE 12.5; 1 MG/1; MG/1
1 TABLET ORAL DAILY
Qty: 90 TABLET | Refills: 3 | Status: SHIPPED | OUTPATIENT
Start: 2019-10-07 | End: 2020-10-20 | Stop reason: SDUPTHER

## 2019-10-07 RX ORDER — LISINOPRIL 10 MG/1
10 TABLET ORAL DAILY
Qty: 90 TABLET | Refills: 1 | Status: CANCELLED | OUTPATIENT
Start: 2019-10-07

## 2019-10-07 ASSESSMENT — PATIENT HEALTH QUESTIONNAIRE - PHQ9
1. LITTLE INTEREST OR PLEASURE IN DOING THINGS: 0
SUM OF ALL RESPONSES TO PHQ QUESTIONS 1-9: 0
SUM OF ALL RESPONSES TO PHQ9 QUESTIONS 1 & 2: 0
2. FEELING DOWN, DEPRESSED OR HOPELESS: 0
SUM OF ALL RESPONSES TO PHQ QUESTIONS 1-9: 0

## 2019-10-07 ASSESSMENT — ENCOUNTER SYMPTOMS
RESPIRATORY NEGATIVE: 1
BLURRED VISION: 0

## 2020-01-31 ENCOUNTER — TELEPHONE (OUTPATIENT)
Dept: OBGYN | Age: 50
End: 2020-01-31

## 2020-03-17 ENCOUNTER — TELEPHONE (OUTPATIENT)
Dept: OBGYN | Age: 50
End: 2020-03-17

## 2020-03-17 NOTE — LETTER
[address] OB GYN A department of [place] [address]  Phone: [phone]  Fax: [phone]    Solon Rinne, APRN - CNP        March 17, 2020     [place] [address] [address]      Dear Dion : This letter is a reminder that your Mammogram test is due. Please call our office to schedule an appointment. If you've already underwent or scheduled this procedure, please disregard this notice.     Sincerely,        Solon Rinne, APRN - CNP

## 2020-04-28 RX ORDER — LISINOPRIL AND HYDROCHLOROTHIAZIDE 12.5; 1 MG/1; MG/1
1 TABLET ORAL DAILY
Qty: 90 TABLET | Refills: 3 | OUTPATIENT
Start: 2020-04-28

## 2020-06-29 ENCOUNTER — TELEPHONE (OUTPATIENT)
Dept: OBGYN | Age: 50
End: 2020-06-29

## 2020-08-05 ENCOUNTER — TELEPHONE (OUTPATIENT)
Dept: OBGYN | Age: 50
End: 2020-08-05

## 2020-10-20 ENCOUNTER — TELEPHONE (OUTPATIENT)
Dept: FAMILY MEDICINE CLINIC | Age: 50
End: 2020-10-20

## 2020-10-20 RX ORDER — LISINOPRIL AND HYDROCHLOROTHIAZIDE 12.5; 1 MG/1; MG/1
1 TABLET ORAL DAILY
Qty: 90 TABLET | Refills: 0 | Status: SHIPPED | OUTPATIENT
Start: 2020-10-20 | End: 2021-01-05

## 2020-10-20 RX ORDER — HYDROCHLOROTHIAZIDE 12.5 MG/1
TABLET ORAL
Qty: 90 TABLET | Refills: 0 | OUTPATIENT
Start: 2020-10-20

## 2020-10-20 RX ORDER — LISINOPRIL 10 MG/1
TABLET ORAL
Qty: 90 TABLET | Refills: 0 | OUTPATIENT
Start: 2020-10-20

## 2020-11-06 ENCOUNTER — OFFICE VISIT (OUTPATIENT)
Dept: PRIMARY CARE CLINIC | Age: 50
End: 2020-11-06

## 2020-11-06 VITALS
OXYGEN SATURATION: 97 % | BODY MASS INDEX: 30.71 KG/M2 | SYSTOLIC BLOOD PRESSURE: 110 MMHG | WEIGHT: 202.6 LBS | DIASTOLIC BLOOD PRESSURE: 82 MMHG | TEMPERATURE: 97.4 F | HEART RATE: 104 BPM | HEIGHT: 68 IN

## 2020-11-06 PROCEDURE — 99213 OFFICE O/P EST LOW 20 MIN: CPT | Performed by: FAMILY MEDICINE

## 2020-11-06 PROCEDURE — 99212 OFFICE O/P EST SF 10 MIN: CPT

## 2020-11-06 RX ORDER — SULFAMETHOXAZOLE AND TRIMETHOPRIM 800; 160 MG/1; MG/1
1 TABLET ORAL 2 TIMES DAILY
Qty: 14 TABLET | Refills: 0 | Status: SHIPPED | OUTPATIENT
Start: 2020-11-06 | End: 2020-11-13

## 2020-11-06 ASSESSMENT — PATIENT HEALTH QUESTIONNAIRE - PHQ9
SUM OF ALL RESPONSES TO PHQ9 QUESTIONS 1 & 2: 0
SUM OF ALL RESPONSES TO PHQ QUESTIONS 1-9: 0
SUM OF ALL RESPONSES TO PHQ QUESTIONS 1-9: 0
1. LITTLE INTEREST OR PLEASURE IN DOING THINGS: 0
2. FEELING DOWN, DEPRESSED OR HOPELESS: 0
SUM OF ALL RESPONSES TO PHQ QUESTIONS 1-9: 0

## 2020-11-06 ASSESSMENT — ENCOUNTER SYMPTOMS: COLOR CHANGE: 1

## 2020-11-06 NOTE — LETTER
[address] Ur[facility] A department of [facility]  Phone: [phone]  Fax: [phone] Dashawn Ding, DO        November 6, 2020     Patient: Sincere Farr   YOB: 1970   Date of Visit: 11/6/2020       To Whom it May Concern:    Renée Nunes was seen in my clinic on 11/6/2020. Please excuse her for this appointment today. If you have any questions or concerns, please don't hesitate to call.     Sincerely,         Doreen Ordonez, DO

## 2020-11-06 NOTE — PROGRESS NOTES
mouth daily      Ascorbic Acid (VITAMIN C) 500 MG CAPS Take 1 tablet by mouth daily      CALCIUM CITRATE-VITAMIN D3 PO Take 1 tablet by mouth 2 times daily       No current facility-administered medications for this visit. Allergies   Allergen Reactions    Codeine Anxiety    Flagyl [Metronidazole] Nausea Only       Review of Systems   Constitutional: Negative for fever. Musculoskeletal: Positive for arthralgias (R great toe) and gait problem (limping due to pain). Skin: Positive for color change and wound (draining yellow drainage). Neurological: Negative for tingling and numbness. Objective:     Vitals:    11/06/20 0955   BP: 110/82   Pulse: 104   Temp: 97.4 °F (36.3 °C)   TempSrc: Tympanic   SpO2: 97%   Weight: 202 lb 9.6 oz (91.9 kg)   Height: 5' 8\" (1.727 m)     Physical Exam  Constitutional:       General: She is not in acute distress. Appearance: Normal appearance. Cardiovascular:      Rate and Rhythm: Normal rate. Feet:      Comments: Paronychia noted over R great toenail with significant tenderness and erythema  Skin:     General: Skin is warm and dry. Neurological:      Mental Status: She is alert. Assessment:       Diagnosis Orders   1. Paronychia of great toe of right foot  sulfamethoxazole-trimethoprim (BACTRIM DS;SEPTRA DS) 800-160 MG per tablet   2. Ingrowing toenail with infection  sulfamethoxazole-trimethoprim (BACTRIM DS;SEPTRA DS) 800-160 MG per tablet       Plan:      Return if symptoms worsen or fail to improve in 3-4 days. No orders of the defined types were placed in this encounter. Orders Placed This Encounter   Medications    sulfamethoxazole-trimethoprim (BACTRIM DS;SEPTRA DS) 800-160 MG per tablet     Sig: Take 1 tablet by mouth 2 times daily for 7 days     Dispense:  14 tablet     Refill:  0       Patient given educational materials - see patient instructions. Discussed use, benefit, and side effects of prescribed medications.   All patient questions answered. Pt voiced understanding.        Electronically signed by Darin Dickinson DO on 11/16/2020 at 12:08 AM

## 2020-11-06 NOTE — PATIENT INSTRUCTIONS
Patient Education        Paronychia: Care Instructions  Your Care Instructions  Paronychia (say \"rhup-tf-HZ-joel-uh\") is an infection of the skin around a fingernail or toenail. It happens when germs enter through a break in the skin. The doctor may have made a small cut in the infected area to drain the pus. Most cases of paronychia improve in a few days. But watch your symptoms and follow your doctor's advice. Though rare, a mild case can turn into something more serious and infect your entire finger or toe. Also, it is possible for an infection to return. Follow-up care is a key part of your treatment and safety. Be sure to make and go to all appointments, and call your doctor if you are having problems. It's also a good idea to know your test results and keep a list of the medicines you take. How can you care for yourself at home? · If your doctor told you how to care for your infected nail, follow the doctor's instructions. If you did not get instructions, follow this general advice:  ? Wash the area with clean water 2 times a day. Don't use hydrogen peroxide or alcohol, which can slow healing. ? You may cover the area with a thin layer of petroleum jelly, such as Vaseline, and a nonstick bandage. ? Apply more petroleum jelly and replace the bandage as needed. · If your doctor prescribed antibiotics, take them as directed. Do not stop taking them just because you feel better. You need to take the full course of antibiotics. · Take an over-the-counter pain medicine, such as acetaminophen (Tylenol), ibuprofen (Advil, Motrin), or naproxen (Aleve). Read and follow all instructions on the label. · Do not take two or more pain medicines at the same time unless the doctor told you to. Many pain medicines have acetaminophen, which is Tylenol. Too much acetaminophen (Tylenol) can be harmful. · Prop up the toe or finger so that it is higher than the level of your heart.  This will help with pain and swelling. · Apply heat. Put a warm water bottle, heating pad set on low, or warm cloth on your finger or toe. Do not go to sleep with a heating pad on your skin. · Soak the area in warm water twice a day for 15 minutes each time. After soaking, dry the area well and apply a thin layer of petroleum jelly, such as Vaseline. Put on a new bandage. When should you call for help? Call your doctor now or seek immediate medical care if:    · You have signs of new or worsening infection, such as:  ? Increased pain, swelling, warmth, or redness. ? Red streaks leading from the infected skin. ? Pus draining from the area. ? A fever. Watch closely for changes in your health, and be sure to contact your doctor if:    · You do not get better as expected. Where can you learn more? Go to https://SocialbombpeBaobab Planeteweb.Green & Pleasant. org and sign in to your Dahu account. Enter [id number] [id number] [phone] in the Metrigo box to learn more about \"Paronychia: Care Instructions. \"     If you do not have an account, please click on the \"Sign Up Now\" link. Current as of: July 2, 2020               Content Version: 12.6  © 9238-6060 [employer]. Care instructions adapted under license by [facility]"[employer] [facility] ([facility]). If you have questions about a medical condition or this instruction, always ask your healthcare professional. [place] [address] any warranty or liability for your use of this information. Patient Education        Ingrown Toenail: Care Instructions  Your Care Instructions     An ingrown toenail often occurs because a nail is not trimmed correctly or because shoes are too tight. An ingrown nail can cause an infection. If your toe is infected, your doctor may prescribe antibiotics. Most ingrown toenails can be treated at home. You should trim toenails straight across, so the ends of the nail grow over the skin and not into it. Good nail care can prevent ingrown toenails.   Follow-up care is a key part of your treatment and safety. Be sure to make and go to all appointments, and call your doctor if you are having problems. It's also a good idea to know your test results and keep a list of the medicines you take. How can you care for yourself at home? · Trim the nails straight across. Leave the corners a little longer so they do not cut into the skin. To do this when you have an ingrown nail:  ? Soak your foot in warm water for about 15 minutes to soften the nail. ? Wedge a small piece of wet cotton under the corner of the nail to cushion the nail and lift it slightly. This keeps it from cutting the skin. ? Repeat daily until the nail has grown out and can be trimmed. · Do not use manicure scissors to dig under the ingrown nail. You might stab your toe, which could get infected. · Do not trim your toenails too short. · Check with your doctor before trimming your own toenails if you have been diagnosed with diabetes or peripheral arterial disease. These conditions increase the risk of an infection, because you may have decreased sensation in your toes and cut yourself without knowing it. · Wear roomy, comfortable shoes. · If your doctor prescribed antibiotics, take them as directed. Do not stop taking them just because you feel better. You need to take the full course of antibiotics. When should you call for help? Call your doctor now or seek immediate medical care if:    · You have signs of infection, such as:  ? Increased pain, swelling, warmth, or redness. ? Red streaks leading from the toe. ? Pus draining from the toe. ? A fever. Watch closely for changes in your health, and be sure to contact your doctor if:    · You do not get better as expected. Where can you learn more? Go to https://Ribbonpeseeweb.Cuiker. org and sign in to your Zhima Tech account. Enter R135 in the LoopIt box to learn more about \"Ingrown Toenail: Care Instructions. \"     If you do not have an account, please click on the \"Sign Up Now\" link. Current as of: July 2, 2020               Content Version: 12.6  © 2006-2020 [employer]. Care instructions adapted under license by [facility] ([facility]). If you have questions about a medical condition or this instruction, always ask your healthcare professional. [address] any warranty or liability for your use of this information.

## 2021-01-03 ENCOUNTER — TELEPHONE (OUTPATIENT)
Dept: INTERNAL MEDICINE | Age: 51
End: 2021-01-03

## 2021-01-03 NOTE — TELEPHONE ENCOUNTER
Received call from Carmen De Guzman at [facility] while on call. Per nurse, patient has order for ativan 1 mg PO BID PRN, however, she did not have any on hand as she was a new admit, and needed the order clarified with the on call pharmacy. Per nurse, patient had above mentioned order approved by Dr. Pretty Rodriguez 01/01/20, but did not have the medication on site. Called Crystal at on call pharmacy, and gave a verbal order for ativan 1 mg PO BID PRN.

## 2021-01-05 ENCOUNTER — HOSPITAL ENCOUNTER (EMERGENCY)
Age: 51
Discharge: HOME | End: 2021-01-05
Attending: EMERGENCY MEDICINE
Payer: MEDICAID

## 2021-01-05 VITALS
TEMPERATURE: 98.9 F | RESPIRATION RATE: 15 BRPM | OXYGEN SATURATION: 98 % | DIASTOLIC BLOOD PRESSURE: 81 MMHG | SYSTOLIC BLOOD PRESSURE: 106 MMHG | HEART RATE: 85 BPM

## 2021-01-05 DIAGNOSIS — T78.40XA ALLERGIC REACTION, INITIAL ENCOUNTER: Primary | ICD-10-CM

## 2021-01-05 PROCEDURE — 99284 EMERGENCY DEPT VISIT MOD MDM: CPT

## 2021-01-05 PROCEDURE — 6370000000 HC RX 637 (ALT 250 FOR IP): Performed by: EMERGENCY MEDICINE

## 2021-01-05 RX ORDER — GAUZE BANDAGE 2" X 2"
100 BANDAGE TOPICAL DAILY
COMMUNITY

## 2021-01-05 RX ORDER — HEPARIN SODIUM 5000 [USP'U]/ML
5000 INJECTION, SOLUTION INTRAVENOUS; SUBCUTANEOUS 3 TIMES DAILY
COMMUNITY
End: 2021-01-12 | Stop reason: ALTCHOICE

## 2021-01-05 RX ORDER — EPINEPHRINE 0.3 MG/.3ML
0.3 INJECTION SUBCUTANEOUS ONCE
Qty: 2 EACH | Refills: 0 | Status: SHIPPED | OUTPATIENT
Start: 2021-01-05 | End: 2021-01-05

## 2021-01-05 RX ORDER — SPIRONOLACTONE 25 MG/1
12.5 TABLET ORAL DAILY
COMMUNITY

## 2021-01-05 RX ORDER — FAMOTIDINE 20 MG/1
20 TABLET, FILM COATED ORAL ONCE
Status: COMPLETED | OUTPATIENT
Start: 2021-01-05 | End: 2021-01-05

## 2021-01-05 RX ORDER — PREDNISONE 20 MG/1
60 TABLET ORAL ONCE
Status: COMPLETED | OUTPATIENT
Start: 2021-01-05 | End: 2021-01-05

## 2021-01-05 RX ORDER — SULFAMETHOXAZOLE AND TRIMETHOPRIM 800; 160 MG/1; MG/1
1 TABLET ORAL 2 TIMES DAILY
COMMUNITY
End: 2021-01-12 | Stop reason: ALTCHOICE

## 2021-01-05 RX ORDER — METOPROLOL SUCCINATE 50 MG/1
50 TABLET, EXTENDED RELEASE ORAL DAILY
COMMUNITY

## 2021-01-05 RX ORDER — DIPHENHYDRAMINE HCL 25 MG
50 TABLET ORAL ONCE
Status: COMPLETED | OUTPATIENT
Start: 2021-01-05 | End: 2021-01-05

## 2021-01-05 RX ORDER — PREDNISONE 10 MG/1
TABLET ORAL
Qty: 20 TABLET | Refills: 0 | Status: SHIPPED | OUTPATIENT
Start: 2021-01-05 | End: 2021-01-12 | Stop reason: ALTCHOICE

## 2021-01-05 RX ORDER — QUETIAPINE FUMARATE 50 MG/1
50 TABLET, FILM COATED ORAL NIGHTLY
COMMUNITY

## 2021-01-05 RX ORDER — LORAZEPAM 1 MG/1
1 TABLET ORAL EVERY 12 HOURS PRN
COMMUNITY

## 2021-01-05 RX ORDER — FLUOXETINE HYDROCHLORIDE 20 MG/1
20 CAPSULE ORAL DAILY
COMMUNITY

## 2021-01-05 RX ADMIN — DIPHENHYDRAMINE HYDROCHLORIDE 50 MG: 25 TABLET ORAL at 14:20

## 2021-01-05 RX ADMIN — FAMOTIDINE 20 MG: 20 TABLET, FILM COATED ORAL at 14:20

## 2021-01-05 RX ADMIN — PREDNISONE 60 MG: 20 TABLET ORAL at 14:20

## 2021-01-05 ASSESSMENT — ENCOUNTER SYMPTOMS
COUGH: 0
SHORTNESS OF BREATH: 0

## 2021-01-05 NOTE — ED PROVIDER NOTES
[facility] ED  [address]  [place] [address] [place]  Phone: [phone]        Pt Name: Chinedu Fuentes  MRN: [id number]  Mil[place]ro 1970  Date of evaluation: 1/5/21      CHIEF COMPLAINT     Chief Complaint   Patient presents with    Allergic Reaction         HISTORY OF PRESENT ILLNESS  (Location/Symptom, Timing/Onset, Context/Setting, Quality, Duration, Modifying Factors, Severity.)    Chinedu Fuentes is a 45 y.o. female who presents for possible allergic reaction. Was eating lunch and developed rash. No difficulty breathing. Currently on Bactrim for a toe infection scheduled to end 1/8/21. No other new medications. Patient in [facility] for etoh abuse and wernicke's encephalopathy placed earlier this year. Also started seroquel and prozac. Already taken off of Lisinopril for possible allergic reactionn. REVIEW OF SYSTEMS    (2-9 systems for level 4, 10 or more for level 5)     Review of Systems   Respiratory: Negative for cough and shortness of breath. Skin: Positive for rash. PAST MEDICAL HISTORY    has a past medical history of Anxiety, GERD (gastroesophageal reflux disease), History of pneumonia, Hyperlipemia, Hypertension, Obesity, and Paroxysmal atrial fibrillation ([facility] [address] SURGICAL HISTORY      has a past surgical history that includes Knee arthroscopy (Right); Ovary removal (Right, 2009); sinus surgery; [place] tooth extraction; Tubal ligation (Bilateral, 2009); Appendectomy; Colonoscopy (8/4/2010); Bariatric Surgery (05/25/2015); and Dilation & curettage. CURRENTMEDICATIONS       Previous Medications    ASCORBIC ACID (VITAMIN C) 500 MG CAPS    Take 1 tablet by mouth daily    FLUOXETINE (PROZAC) 20 MG CAPSULE    Take 20 mg by mouth daily    HEPARIN SODIUM, PORCINE, (HEPARIN, PORCINE,) 5000 UNIT/ML INJECTION    Inject 5,000 Units into the skin 3 times daily    LORAZEPAM (ATIVAN) 1 MG TABLET    Take 1 mg by mouth every 12 hours as needed for Anxiety. Musculoskeletal: Normal range of motion and neck supple. No neck rigidity or muscular tenderness. Cardiovascular:      Rate and Rhythm: Normal rate and regular rhythm. Pulmonary:      Effort: Pulmonary effort is normal. No respiratory distress. Breath sounds: Normal breath sounds. No stridor. No wheezing, rhonchi or rales. Musculoskeletal: Normal range of motion. Lymphadenopathy:      Cervical: No cervical adenopathy. Skin:     Comments: Erythema and swelling to the facial region some erythema to the upper extremities consistent with what appears to be an allergic reaction no cellulitis or other abnormality appreciated   Neurological:      Comments: Patient with no focal deficits         DIFFERENTIAL DIAGNOSIS/ MDM:     Patient presents with an allergic reaction has no difficulty breathing no swelling of the tongue or throat has had similar reactions in the past and looking at her past history it does not appear that she has received any medications for this allergic reaction so I will give the patient some prednisone Benadryl and Pepcid and observe her here in the emergency department    [address]:  No results found for this visit on 01/05/21.         EMERGENCY DEPARTMENT COURSE:   Vitals:    Vitals:    01/05/21 1345   BP: 95/74   Pulse: 92   Resp: 14   Temp: 98.9 °F (37.2 °C)   TempSrc: Tympanic   SpO2: 97%     -------------------------  BP: 95/74, Temp: 98.9 °F (37.2 °C), Pulse: 92, Resp: 14      RE-EVALUATION: The patient on repeat evaluation is feeling better her rash is improving she has no difficulty breathing no chest pain no lightheaded or dizziness given this I do feel able to be followed up as an outpatient I will write a prescription for prednisone I am recommending she may take Benadryl Pepcid as needed she is to stop her Bactrim use as that is a new antibiotic that I do not see the patient has been on previously she is to return to the ER for worsening of symptoms any lightheaded dizziness chest pain shortness of breath swelling of the tongue or throat or other concerns otherwise she is to follow-up with her family doctor within the next few days  At this time the patient is without objective evidence of an acute process requiring hospitalization or inpatient management. They have remained hemodynamically stable throughout their entire ED visit and are stable for discharge with outpatient follow-up. The patient understands that at this time there is no evidence for a more malignant underlying process, but the patient also understands that early in the process of an illness or injury, an emergency department workup can be falsely reassuring. Routine discharge counseling was given, and the patient understands that worsening, changing or persistent symptoms should prompt an immediate call or follow up with their primary physician or return to the emergency department. The importance of appropriate follow up was also discussed. I have reviewed the disposition diagnosis with the patient and or their family/guardian. I have answered their questions and given discharge instructions. They voiced understanding of these instructions and did not have any further questions or complaints. PROCEDURES:  None    FINAL IMPRESSION      1.  Allergic reaction, initial encounter          DISPOSITION/PLAN   DISPOSITION        CONDITION ON DISPOSITION:   Improved - Stable    PATIENT REFERRED TO:  Carmelina Man MD [address]  [phone]    Call in 1 day        DISCHARGE MEDICATIONS:  New Prescriptions    EPINEPHRINE (EPIPEN 2-LUZ) 0.3 MG/0.3ML SOAJ INJECTION    Inject 0.3 mLs into the muscle once for 1 dose Use as directed for allergic reaction    PREDNISONE (DELTASONE) 10 MG TABLET    Take 4 tablets by mouth once daily for 5 days       (Please note that portions of this note were completed with a voicerecognition program.  Efforts were made to edit the dictations but occasionally words are mis-transcribed.)    Garnica MD, F.A.C.E.P.   Attending Emergency Medicine Physician       Fritz Guerra MD  01/05/21 1812

## 2021-01-05 NOTE — ED NOTES
Pt updated on discharge plan of care multiple times due to pt confused.      Agustín Trevino RN  01/05/21 0854

## 2021-01-05 NOTE — ED NOTES
Son states for us to arrange transportation due to patient being a flight risk.      Mikayla Angulo RN  01/05/21 3396

## 2021-01-05 NOTE — ED NOTES
Spoke with son Annabellee Briands per patient ok. Patient to be discharged soon and son will come and transport.      Carmina Dietz RN  01/05/21 3982

## 2021-01-05 NOTE — ED NOTES
Report to Chasity CHENG at [facility]. Orders communicated. Pt denies questions or distress.      Calos Vee RN  01/05/21 8017

## 2021-01-05 NOTE — FLOWSHEET NOTE
rounding in ED. Assessment: Patient calm. She expressed the need to call family to set up a ride home. Patient's son with ex-. Intervention:  provided a supportive presence and checked in with registration to see if family had been contacted. Outcome:  Patient expressed her appreciation for assistance and support.     Plan:  Electronically signed by Rey Martinez on 1/5/2021 at 3:12 PM        01/05/21 1508   Encounter Summary   Services provided to: Patient   Referral/Consult From: [address] [place] [address] Unknown   Complexity of Encounter Low   Length of Encounter 15 minutes   Spiritual/Oriental orthodox   Type Spiritual support   Assessment Calm;Coping   Intervention Active listening   Outcome Expressed gratitude;Expressed feelings/needs/concerns;Receptive       Electronically signed by Rey Martinez on 1/5/2021 at 3:12 PM

## 2021-01-11 ENCOUNTER — TELEPHONE (OUTPATIENT)
Dept: FAMILY MEDICINE CLINIC | Age: 51
End: 2021-01-11

## 2021-01-11 DIAGNOSIS — F04 WERNICKE-KORSAKOFF SYNDROME (HCC): ICD-10-CM

## 2021-01-11 DIAGNOSIS — F33.1 MODERATE EPISODE OF RECURRENT MAJOR DEPRESSIVE DISORDER (HCC): ICD-10-CM

## 2021-01-11 NOTE — TELEPHONE ENCOUNTER
Patient see GO at [facility]. Patients mother called stating she would like patient to go to facility where they are familiar with Wernicke-Korsakoff Syndrome. She states she is not getting bad care at [facility], however she would like patient to go where she can rehabilitated.

## 2021-01-11 NOTE — TELEPHONE ENCOUNTER
Patients mother Esperanza Stafford called and she has called [facility] and patient could possibly go there, but will need a doctors order. I explained to patient's mother, I am unable to give any information due to no consent paperwork. Mother asking if you will talk to patient at nurses and patient at [facility] tomorrow.

## 2021-01-11 NOTE — TELEPHONE ENCOUNTER
Tanvi García calling back stating she spoke with Maryam Alexis this morning and since then she has spoke with [facility] and has some information for Maryam Alexis, will give no more information, please advise, Tanvi García states Maryam Alexis has her number.

## 2021-01-12 ENCOUNTER — OUTSIDE SERVICES (OUTPATIENT)
Dept: FAMILY MEDICINE CLINIC | Age: 51
End: 2021-01-12
Payer: MEDICAID

## 2021-01-12 DIAGNOSIS — F20.9 SCHIZOPHRENIA, UNSPECIFIED TYPE (HCC): ICD-10-CM

## 2021-01-12 DIAGNOSIS — I10 ESSENTIAL HYPERTENSION: ICD-10-CM

## 2021-01-12 DIAGNOSIS — I42.6 ALCOHOLIC CARDIOMYOPATHY (HCC): ICD-10-CM

## 2021-01-12 DIAGNOSIS — E87.1 HYPONATREMIA: ICD-10-CM

## 2021-01-12 DIAGNOSIS — N39.0 URINARY TRACT INFECTION WITHOUT HEMATURIA, SITE UNSPECIFIED: ICD-10-CM

## 2021-01-12 DIAGNOSIS — R63.0 APPETITE LOSS: ICD-10-CM

## 2021-01-12 DIAGNOSIS — F10.20 ALCOHOLISM (HCC): ICD-10-CM

## 2021-01-12 DIAGNOSIS — F04 WERNICKE-KORSAKOFF SYNDROME (HCC): ICD-10-CM

## 2021-01-12 DIAGNOSIS — F41.9 ANXIETY: ICD-10-CM

## 2021-01-12 DIAGNOSIS — E87.6 HYPOKALEMIA: ICD-10-CM

## 2021-01-12 DIAGNOSIS — I48.0 PAROXYSMAL ATRIAL FIBRILLATION (HCC): ICD-10-CM

## 2021-01-12 DIAGNOSIS — L03.90 CELLULITIS, UNSPECIFIED CELLULITIS SITE: ICD-10-CM

## 2021-01-12 DIAGNOSIS — K21.9 GASTROESOPHAGEAL REFLUX DISEASE WITHOUT ESOPHAGITIS: ICD-10-CM

## 2021-01-12 DIAGNOSIS — F31.9 BIPOLAR 1 DISORDER (HCC): ICD-10-CM

## 2021-01-12 PROBLEM — F33.1 MODERATE EPISODE OF RECURRENT MAJOR DEPRESSIVE DISORDER (HCC): Status: ACTIVE | Noted: 2021-01-12

## 2021-01-12 PROBLEM — F19.94 MOOD DISORDER, DRUG-INDUCED (HCC): Status: ACTIVE | Noted: 2017-09-12

## 2021-01-12 PROBLEM — F06.30 ORGANIC MOOD DISORDER: Status: ACTIVE | Noted: 2017-09-12

## 2021-01-12 PROBLEM — F32.A DEPRESSION: Status: ACTIVE | Noted: 2017-09-11

## 2021-01-12 PROBLEM — I42.9 CARDIOMYOPATHY (HCC): Status: ACTIVE | Noted: 2018-07-08

## 2021-01-12 PROBLEM — F10.939 ALCOHOL WITHDRAWAL (HCC): Status: ACTIVE | Noted: 2017-09-11

## 2021-01-12 PROBLEM — R41.3 AMNESIA: Status: ACTIVE | Noted: 2020-11-24

## 2021-01-12 PROCEDURE — 99305 1ST NF CARE MODERATE MDM 35: CPT | Performed by: FAMILY MEDICINE

## 2021-01-12 RX ORDER — ACETAMINOPHEN 325 MG/1
650 TABLET ORAL EVERY 6 HOURS PRN
COMMUNITY

## 2021-01-12 RX ORDER — DIAPER,BRIEF,INFANT-TODD,DISP
EACH MISCELLANEOUS 2 TIMES DAILY
COMMUNITY

## 2021-01-12 RX ORDER — POLYETHYLENE GLYCOL 3350 17 G/17G
17 POWDER, FOR SOLUTION ORAL PRN
COMMUNITY

## 2021-01-12 RX ORDER — DIPHENHYDRAMINE HCL 50 MG
50 CAPSULE ORAL EVERY 6 HOURS PRN
COMMUNITY

## 2021-01-12 RX ORDER — MAGNESIUM HYDROXIDE/ALUMINUM HYDROXICE/SIMETHICONE 120; 1200; 1200 MG/30ML; MG/30ML; MG/30ML
SUSPENSION ORAL PRN
COMMUNITY

## 2021-01-12 RX ORDER — BISACODYL 10 MG
10 SUPPOSITORY, RECTAL RECTAL PRN
COMMUNITY

## 2021-01-12 RX ORDER — DOCUSATE SODIUM 100 MG/1
100 CAPSULE, LIQUID FILLED ORAL 3 TIMES DAILY PRN
COMMUNITY

## 2021-01-14 NOTE — PROGRESS NOTES
This is a 70-year-old female patient of Mary Slaughter who was admitted for rehabilitation after recent hospitalization and extended stay at [facility]. After failing to make contact with her, several times on the phone, family found her within her house sitting on the couch unable to gain her feet. She was conscious but fairly confused and poorly oriented. She was transferred to the hospital. MRI of the brain showed defused restriction within several areas of peripheral cortex of the right frontal lobe likely due to subacute ischemia. There was also mention of a hypothalamic lesion, concerning for a source for her Wernicke-Korsakoff syndrome. She has made slow progress since her initial hospitalization. She was thought to have acute kidney injury and dehydration likely due to poor appetite, poor oral intake, and poor thirst. On admission she had some ongoing cellulitis on her right great toe which was treated with Bactrim on November 6, 2020. She also received three IV infusions of ceftriaxone for a complicating urinary tract infection during her hospitalization. She had multiple medications adjusted initially her antihypertensive drug lisinopril and hydrochlorothiazide were held due to hypotension and some acute on chronic renal insufficiency. Her renal function did improve. She initially had some hypotension mostly asymptomatic. There was mention of orthostatic hypotension without syncope. Cardiology was consulted due to her history of atrial fibrillation and cardiomyopathy. She was started on Toprol once daily for some sinus tachycardia. She is not currently on any anticoagulation for atrial fibrillation. She had significant problems with anxiety, depression, confabulation, confusion, and paranoia. She was started on Seroquel and Prozac. She also received IV thiamine for her Wernicke-Korsakoff syndrome. B12 levels were within normal limits at 718.  She was discharged to the extended care facility for further care. She had some complicating hypokalemia and hyponatremia. Fluid restrictions were not continued on discharge. She has had relatively poor oral intake throughout her hospitalization and rehab stay.         LABS:  December 3, 2020  BNP 27   Magnesium  2.1   Sodium 131 (L)   Potassium 3.9   BUN 29 (H)   Creatinine 1.96 (H)   Glucose 84   Calcium 9.1   Total Protein 6.6   Albumin 3.6   Alkaline Phosphatase 74   AST 30   ALT 28   Total bilirubin 0.7       November 25, 2020  Ammonia  29         Immunization History   Administered Date(s) Administered    Influenza Vaccine, unspecified formulation 10/14/2016    Influenza Whole 11/01/2015    Pneumococcal Polysaccharide ([id number]) 09/14/2017       Allergies   Allergen Reactions    Bactrim [Sulfamethoxazole-Trimethoprim]     Codeine Anxiety    Flagyl [Metronidazole] Nausea Only       Past Medical History:   Diagnosis Date    Anxiety     GERD (gastroesophageal reflux disease)     History of pneumonia 07/2018    Legionella requiring intubation    Hyperlipemia     Hypertension     Moderate episode of recurrent major depressive disorder ([facility] [address] 1/12/2021    Obesity     Paroxysmal atrial fibrillation ([facility] [address] 07/2018    during pneumonia hospitalization    Wernicke-Korsakoff syndrome ([facility] [address] 1/12/2021       Past Surgical History:   Procedure Laterality Date    APPENDECTOMY      age 15    BARIATRIC SURGERY  05/25/2015    COLONOSCOPY  8/4/2010    DILATION AND CURETTAGE      KNEE ARTHROSCOPY Right     OVARY REMOVAL Right 2009    cyst    SINUS SURGERY      TUBAL LIGATION Bilateral 2009    WISDOM TOOTH EXTRACTION         Social History     Socioeconomic History    Marital status:      Spouse name: Not on file    Number of children: Not on file    Years of education: Not on file    Highest education level: Not on file   Occupational History     Employer: [employer]   Social Needs    Financial resource strain: Not on file  Food insecurity     Worry: Not on file     Inability: Not on file    Transportation needs     Medical: Not on file     Non-medical: Not on file   Tobacco Use    Smoking status: Never Smoker    Smokeless tobacco: Never Used   Substance and Sexual Activity    Alcohol use: Yes     Alcohol/week: 3.0 standard drinks     Types: 3 Glasses of wine per week     Comment: occasional    Drug use: No    Sexual activity: Yes     Partners: Male     Comment:  8 years.   NO dyspareunia or PCB   Lifestyle    Physical activity     Days per week: Not on file     Minutes per session: Not on file    Stress: Not on file   Relationships    Social connections     Talks on phone: Not on file     Gets together: Not on file     Attends Latter day service: Not on file     Active member of club or organization: Not on file     Attends meetings of clubs or organizations: Not on file     Relationship status: Not on file    Intimate partner violence     Fear of current or ex partner: Not on file     Emotionally abused: Not on file     Physically abused: Not on file     Forced sexual activity: Not on file   Other Topics Concern    Not on file   Social History Narrative    Not on file       Current Outpatient Medications   Medication Sig Dispense Refill    Nutritional Supplements (VITAMIN D BOOSTER PO) Take 1,000 mcg by mouth daily      diphenhydrAMINE (BENADRYL) 50 MG capsule Take 50 mg by mouth every 6 hours as needed for Itching      carbamide peroxide (DEBROX) 6.5 % otic solution Place 5 drops into both ears as needed      bisacodyl (DULCOLAX) 10 MG suppository Place 10 mg rectally as needed for Constipation      docusate sodium (COLACE) 100 MG capsule Take 100 mg by mouth 3 times daily as needed for Constipation      EPINEPHrine HCl, Anaphylaxis, (EPIPEN IM) Inject into the muscle as needed      hydrocortisone 1 % cream Apply topically 2 times daily Apply topically 2 times daily to face  aluminum & magnesium hydroxide-simethicone (MAALOX) 200-200-20 MG/5ML SUSP suspension Take by mouth as needed for Indigestion      magnesium hydroxide (MILK OF MAGNESIA) 400 MG/5ML suspension Take 30 mLs by mouth as needed for Constipation      polyethylene glycol (MIRALAX) 17 g PACK packet Take 17 g by mouth as needed      Sennosides (SENOKOT PO) Take 2 tablets by mouth daily as needed      acetaminophen (TYLENOL) 325 MG tablet Take 650 mg by mouth every 6 hours as needed for Pain      FLUoxetine (PROZAC) 20 MG capsule Take 20 mg by mouth daily      metoprolol succinate (TOPROL XL) 50 MG extended release tablet Take 50 mg by mouth daily      QUEtiapine (SEROQUEL) 50 MG tablet Take 50 mg by mouth nightly       spironolactone (ALDACTONE) 25 MG tablet Take 12.5 mg by mouth daily      thiamine mononitrate 100 MG tablet Take 100 mg by mouth daily      LORazepam (ATIVAN) 1 MG tablet Take 1 mg by mouth every 12 hours as needed for Anxiety.  EPINEPHrine (EPIPEN 2-LUZ) 0.3 MG/0.3ML SOAJ injection Inject 0.3 mLs into the muscle once for 1 dose Use as directed for allergic reaction 2 each 0    Ascorbic Acid (VITAMIN C) 500 MG CAPS Take 1 tablet by mouth daily       No current facility-administered medications for this visit.         REVIEW OF SYSTEMS: She reports a somewhat persistent rash that started during her hospitalization. Initially it was mostly facial erythema. She now has concerns for pruritic lesions on the extremities and upper back. She reports a relatively poor appetite but no nausea or vomiting. She feels like her cognition is improving although she still feels a little foggy. She still has some anxiety and depression. She has a somewhat tearful affect today at times. She has no plan for suicide. Staff reports that she is somewhat impulsive and will often try to unzip her isolation door cover and try to walk out into the [place], despite reminders to remain in the isolation room and to ask for help. Appetite seems variable and somewhat reduced per staff. Patient denies any seizure issues. She denies any focal loss of strength or sensation. She denies any visual changes. Cardiovascular review is negative. She is aware of atrial fibrillation diagnosis but is not sure what it means. She has no current musculoskeletal concerns. PHYSICAL EXAM:  Vital Signs:  Weight 177.6 pounds at admission, current weight 174 pounds. Respiratory rate 16. Blood pressure 100/60, follow up blood pressure 108/76  Temperature 97.5. Pulse 101. SPO2 95% on room air. Constitutional:   Age appropriate female sitting in her room with her leg crossed. Hair is well-groomed. She is well-dressed. EYES:  No nystagmus. Cardiovascular:   Regular rate and rhythm. S1 normal and S2 normal.   Pulmonary:   Lungs sound grossly clear. Abdominal:  Soft. Nontender. Nondistended. Active bowel sounds. Musculoskeletal:      Extremities are well perfused without edema.    Skin: Face has some dull erythema, fairly light. No excoriations. The rest of the areas on her arms she can feel just a little scale or roughness to the skin, but I do not see any significant erythematous welts, rashes, vesicles, or other sources of a confluent rash. The same on her upper left back there is no visible rash but just a light texture to the skin which I think is consistent with dry skin. Neurological:    No active tremor. No visits with results within 1 Month(s) from this visit. Latest known visit with results is:   Hospital Outpatient Visit on 09/28/2019   Component Date Value Ref Range Status    TSH 09/28/2019 0.94  0.30 - 5.00 mIU/L Final    Cholesterol 09/28/2019 243* <200 mg/dL Final    Comment:    Cholesterol Guidelines:      <200  Desirable   200-240  Borderline      >240  Undesirable         HDL 09/28/2019 127  >40 mg/dL Final    Comment:    HDL Guidelines:    <40     Undesirable   40-59    Borderline    >59     Desirable         LDL Cholesterol 09/28/2019 104  0 - 130 mg/dL Final    Comment:    LDL Guidelines:     <100    Desirable   100-129   Near to/above Desirable   130-159   Borderline      >159   Undesirable     Direct (measured) LDL and calculated LDL are not interchangeable tests.  Chol/HDL Ratio 09/28/2019 1.9  <5 Final            Triglycerides 09/28/2019 58  <150 mg/dL Final    Comment:    Triglyceride Guidelines:     <150   Desirable   150-199  Borderline   200-499  High     >499   Very high   Based on AHA Guidelines for fasting triglyceride, October 2012.          VLDL 09/28/2019 NOT REPORTED  1 - 30 mg/dL Final    Glucose 09/28/2019 95  70 - 99 mg/dL Final    BUN 09/28/2019 4* 6 - 20 mg/dL Final    CREATININE 09/28/2019 0.55  0.50 - 0.90 mg/dL Final    Bun/Cre Ratio 09/28/2019 7* 9 - 20 Final    Calcium 09/28/2019 9.3  8.6 - 10.4 mg/dL Final    Sodium 09/28/2019 131* 135 - 144 mmol/L Final    Potassium 09/28/2019 4.2  3.7 - 5.3 mmol/L Final  Chloride 09/28/2019 93* 98 - 107 mmol/L Final    CO2 09/28/2019 25  20 - 31 mmol/L Final    Anion Gap 09/28/2019 13  9 - 17 mmol/L Final    Alkaline Phosphatase 09/28/2019 45  35 - 104 U/L Final    ALT 09/28/2019 31  5 - 33 U/L Final    AST 09/28/2019 60* <32 U/L Final    Total Bilirubin 09/28/2019 0.59  0.3 - 1.2 mg/dL Final    Total Protein 09/28/2019 7.5  6.4 - 8.3 g/dL Final    Alb 09/28/2019 4.8  3.5 - 5.2 g/dL Final    Albumin/Globulin Ratio 09/28/2019 1.8  1.0 - 2.5 Final    GFR Non- 09/28/2019 >60  >60 mL/min Final    GFR  09/28/2019 >60  >60 mL/min Final    GFR Comment 09/28/2019        Final    Comment: Average GFR for 38-51 years old:   80 mL/min/1.73sq m  Chronic Kidney Disease:   <60 mL/min/1.73sq m  Kidney failure:   <15 mL/min/1.73sq m              eGFR calculated using average adult body mass. Additional eGFR calculator available at:        [email or web address]            GFR Staging 09/28/2019 NOT REPORTED   Final         ASSESSMENT/PLAN:  1. Wernicke-Korsakoff syndrome. She had normal B12 levels. She was given thiamine IV in the hospital and is now getting oral thiamine. Reported in the records on her MRI there is a hypothalamic lesion, that they feel is related to some of the cognitive changes, Wernicke-Korsakoff syndrome, and her current poor appetite and poor thirst. She continues with speech therapy to help with cognition and speech. She overall has improved. 2. Substance abuse and alcoholism. She is currently abstinent from alcohol. She reports social stressors overwhelming her and causing her to drink alcohol. She currently has no signs or symptoms of withdrawal. She has as needed Ativan for severe anxiety, agitation, or concerns for withdrawal. She will need long term rehab, involvement in outpatient programs, and support from her family going forward. 3. History of cardiomyopathy. Ejection fraction down to 35% per study in 2018. Source unclear. She is known to have grade 2 diastolic dysfunction likely alcohol or atrial fibrillation induced. She does not follow regularly with a cardiologist.   4. Paroxysmal atrial fibrillation. No known reoccurrences. she is not on any anticoagulation long term. She had some hypotension complicated by some tachycardia. She was started on Toprol XL to help with rate control. 5. Hypertension. Her Lisinopril and hydrochlorothiazide were discontinued on admission, due to acute kidney injury, electrolyte abnormalities including hypokalemia and hyponatremia. Lisinopril was later restarted, but at that time she had an increase in her creatinine up to 1.33 and they discontinued the Lisinopril. Creatinine reportedly improved with IV fluid. Continue Toprol XL 50mg a day and Aldactone 12.5mg daily. Update CBC and metabolic profile in one week. 6. History of bipolar disorder schizophrenia. Currently we do not have any psychiatric notes. Psychiatrist consulted on her in the hospital and had no additional diagnosis beyond alcohol abuse alcoholism. She was started on fluoxetine 20mg a day and Seroquel 50mg at bedtime for her anxiety and depression. She has had a history of some paranoia, anxiety, and sometimes agitated; complicated by her Wernicke-Korsakoff syndrome. Currently she seems better. She still has somewhat of a depressed affect. The fogginess of her cognition is slowly starting to clear, and she has a little better understanding of where she is at and why she is here. Recommend consideration of counseling as an outpatient, along with regular psychiatry follow up. 7. History of GERD. She is currently on no treatment outside of Maalox as needed. She has no concerns for heartburn or gastritis. 8. History of anxiety. She has PRN Ativan for anxiety, agitation, or alcohol withdrawal symptoms of concern. Relatively recently she started on Prozac 20mg for depression. She is being followed by [facility] via teleconference visits here at the facility. They recently increased her fluoxetine. 9. Cellulitis, right great toe paronychia complication. It looks like she was treated with Bactrim November 6, 2020. She was treated again with Bactrim during her hospitalization in December. She also received ceftriaxone for a urinary tract infection along with the toe cellulitis. Interestingly Bactrim is on her allergy list and there were some concerns for the rash, but I suspect rash is more dry skin at present. 10. Urinary tract infection during hospitalization. No ongoing symptoms of concern. 11. History of insomnia. She currently has Benadryl as needed. She is also getting a dose of Seroquel at bedtime which should help with sleep. Currently no concerns. 12. History of hypokalemia. Potassium was down to 2. This was likely due to diuretic use. She is currently off the hydrochlorothiazide. She is now on Aldactone with no potassium supplement. Will check with her updated labs. 13. History of hyponatremia. Also, likely due to diuretic use. She was on fluid restrictions which were not continued at discharge. She actually has some difficulty with thirst an appetite at present that we are worried about, thus fluids are not being restricted. Will update with her next metabolic profile. 14. Rash. Mostly evident on the face as facial flushing, cheeks look a little erythematous. Reportedly improved with a couple days of hydrocortisone in the hospital. She has additional areas of concern for rash, but it is not readily visible. We are going to have her start Eucerin cream or moisturizing cream daily to help with likely dry skin issues. Can continue the hydrocortisone cream as needed. 15. Poor appetite and poor thirst. Both of these are suspected as a complication of the hypothalamic lesion likely related to her Wernicke-Korsakoff syndrome. She is down about four pounds since admission to the extended care facility. We are going to push for full meals and supplements, although she is often refusing which may be complicated by her depression. She does not look thin or anorexic. Body habitus looks fairly healthy at present. Will continue to monitor. Remainder of chronic health conditions stable and unchanged  Plan as noted above. Will follow up for routine monthly rounds. They will call sooner with any concerns prior. Danny Dahl am personally transcribing for Elsie Kanner, MD 1/14/21 at 11:41 AM NATHAN Romero MD, personally performed the services described in this document as transcribed by the , and it is both accurate and complete.     Electronically signed by Elsie Kanner, MD on 1/20/21 at 1:27 PM EST

## 2021-10-01 ENCOUNTER — TELEPHONE (OUTPATIENT)
Dept: FAMILY MEDICINE CLINIC | Age: 51
End: 2021-10-01

## 2021-10-01 NOTE — TELEPHONE ENCOUNTER
Patient's mother is calling and states that she has not had her PAP and Mammogram in over year. She called OB/GYN and they stated that they would put her on a list but the list is very long and they said to call her PCP to see if [facility] could just do the PAP. Mother was advised that [facility] usually does not do PAPs but this writer would certainly ask, Please call Mom and advise.

## 2021-10-04 ENCOUNTER — TELEPHONE (OUTPATIENT)
Dept: FAMILY MEDICINE CLINIC | Age: 51
End: 2021-10-04

## 2021-10-04 NOTE — TELEPHONE ENCOUNTER
Patient is in assisted living in [facility] and mother called and wants patient to get a Pap smear and mammogram.  Clarisa'ts mother would like recommendations as to where she can go as there is a wait list in OBGYN and she does not want to wait that long. Writer provided phone numbers to 2 OB-GYN offices in [place]. [facility] and [facility]. Encouraged pt mother to call both places to see if they took pt insurance and may call back if needs further guidance.

## 2021-11-08 LAB — MAMMOGRAPHY, EXTERNAL: NORMAL

## 2022-04-26 ENCOUNTER — APPOINTMENT (OUTPATIENT)
Dept: GENERAL RADIOLOGY | Age: 52
DRG: 313 | End: 2022-04-26
Payer: MEDICAID

## 2022-04-26 ENCOUNTER — APPOINTMENT (OUTPATIENT)
Dept: CT IMAGING | Age: 52
DRG: 313 | End: 2022-04-26
Payer: MEDICAID

## 2022-04-26 ENCOUNTER — HOSPITAL ENCOUNTER (INPATIENT)
Age: 52
LOS: 6 days | Discharge: HOME HEALTH | DRG: 313 | End: 2022-05-02
Attending: EMERGENCY MEDICINE | Admitting: PODIATRIST
Payer: MEDICAID

## 2022-04-26 DIAGNOSIS — S82.851A TRIMALLEOLAR FRACTURE OF RIGHT ANKLE, CLOSED, INITIAL ENCOUNTER: ICD-10-CM
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
## 2022-04-26 DIAGNOSIS — G89.18 POST-OP PAIN: ICD-10-CM

## 2022-04-26 DIAGNOSIS — S82.851A CLOSED TRIMALLEOLAR FRACTURE OF RIGHT ANKLE, INITIAL ENCOUNTER: ICD-10-CM

## 2022-04-26 LAB
ALBUMIN SERPL-MCNC: 3.9 G/DL (ref 3.5–5.1)
ALP BLD-CCNC: 77 U/L (ref 38–126)
ALT SERPL-CCNC: 9 U/L (ref 11–66)
ANION GAP SERPL CALCULATED.3IONS-SCNC: 9 MEQ/L (ref 8–16)
AST SERPL-CCNC: 19 U/L (ref 5–40)
BASOPHILS # BLD: 0.3 %
BASOPHILS ABSOLUTE: 0 THOU/MM3 (ref 0–0.1)
BILIRUB SERPL-MCNC: 0.2 MG/DL (ref 0.3–1.2)
BUN BLDV-MCNC: 14 MG/DL (ref 7–22)
CALCIUM SERPL-MCNC: 9.2 MG/DL (ref 8.5–10.5)
CHLORIDE BLD-SCNC: 106 MEQ/L (ref 98–111)
CO2: 25 MEQ/L (ref 23–33)
CREAT SERPL-MCNC: 0.7 MG/DL (ref 0.4–1.2)
EKG ATRIAL RATE: 54 BPM
EKG P AXIS: 38 DEGREES
EKG P-R INTERVAL: 156 MS
EKG Q-T INTERVAL: 458 MS
EKG QRS DURATION: 86 MS
EKG QTC CALCULATION (BAZETT): 434 MS
EKG R AXIS: 27 DEGREES
EKG T AXIS: 35 DEGREES
EKG VENTRICULAR RATE: 54 BPM
EOSINOPHIL # BLD: 0.8 %
EOSINOPHILS ABSOLUTE: 0.1 THOU/MM3 (ref 0–0.4)
ERYTHROCYTE [DISTWIDTH] IN BLOOD BY AUTOMATED COUNT: 13 % (ref 11.5–14.5)
ERYTHROCYTE [DISTWIDTH] IN BLOOD BY AUTOMATED COUNT: 43.3 FL (ref 35–45)
GFR SERPL CREATININE-BSD FRML MDRD: 88 ML/MIN/1.73M2
GLUCOSE BLD-MCNC: 106 MG/DL (ref 70–108)
HCT VFR BLD CALC: 42.8 % (ref 37–47)
HEMOGLOBIN: 13.4 GM/DL (ref 12–16)
IMMATURE GRANS (ABS): 0.04 THOU/MM3 (ref 0–0.07)
IMMATURE GRANULOCYTES: 0.3 %
LYMPHOCYTES # BLD: 23.1 %
LYMPHOCYTES ABSOLUTE: 2.7 THOU/MM3 (ref 1–4.8)
MCH RBC QN AUTO: 28.7 PG (ref 26–33)
MCHC RBC AUTO-ENTMCNC: 31.3 GM/DL (ref 32.2–35.5)
MCV RBC AUTO: 91.6 FL (ref 81–99)
MONOCYTES # BLD: 5.4 %
MONOCYTES ABSOLUTE: 0.6 THOU/MM3 (ref 0.4–1.3)
NUCLEATED RED BLOOD CELLS: 0 /100 WBC
OSMOLALITY CALCULATION: 280.3 MOSMOL/KG (ref 275–300)
PLATELET # BLD: 240 THOU/MM3 (ref 130–400)
PMV BLD AUTO: 11.6 FL (ref 9.4–12.4)
POTASSIUM REFLEX MAGNESIUM: 4.8 MEQ/L (ref 3.5–5.2)
PRO-BNP: 133.5 PG/ML (ref 0–900)
RBC # BLD: 4.67 MILL/MM3 (ref 4.2–5.4)
SEG NEUTROPHILS: 70.1 %
SEGMENTED NEUTROPHILS ABSOLUTE COUNT: 8.3 THOU/MM3 (ref 1.8–7.7)
SODIUM BLD-SCNC: 140 MEQ/L (ref 135–145)
TOTAL PROTEIN: 7 G/DL (ref 6.1–8)
TROPONIN T: < 0.01 NG/ML
WBC # BLD: 11.9 THOU/MM3 (ref 4.8–10.8)

## 2022-04-26 PROCEDURE — 73700 CT LOWER EXTREMITY W/O DYE: CPT

## 2022-04-26 PROCEDURE — 2500000003 HC RX 250 WO HCPCS: Performed by: STUDENT IN AN ORGANIZED HEALTH CARE EDUCATION/TRAINING PROGRAM

## 2022-04-26 PROCEDURE — 99285 EMERGENCY DEPT VISIT HI MDM: CPT

## 2022-04-26 PROCEDURE — 36415 COLL VENOUS BLD VENIPUNCTURE: CPT

## 2022-04-26 PROCEDURE — 80053 COMPREHEN METABOLIC PANEL: CPT

## 2022-04-26 PROCEDURE — 93010 ELECTROCARDIOGRAM REPORT: CPT | Performed by: INTERNAL MEDICINE

## 2022-04-26 PROCEDURE — 84484 ASSAY OF TROPONIN QUANT: CPT

## 2022-04-26 PROCEDURE — 85025 COMPLETE CBC W/AUTO DIFF WBC: CPT

## 2022-04-26 PROCEDURE — 83880 ASSAY OF NATRIURETIC PEPTIDE: CPT

## 2022-04-26 PROCEDURE — 6360000002 HC RX W HCPCS: Performed by: PHYSICIAN ASSISTANT

## 2022-04-26 PROCEDURE — 73600 X-RAY EXAM OF ANKLE: CPT

## 2022-04-26 PROCEDURE — 6370000000 HC RX 637 (ALT 250 FOR IP): Performed by: PHYSICIAN ASSISTANT

## 2022-04-26 PROCEDURE — 1200000000 HC SEMI PRIVATE

## 2022-04-26 PROCEDURE — 99221 1ST HOSP IP/OBS SF/LOW 40: CPT | Performed by: SURGERY

## 2022-04-26 PROCEDURE — 27786 TREATMENT OF ANKLE FRACTURE: CPT

## 2022-04-26 PROCEDURE — 73610 X-RAY EXAM OF ANKLE: CPT

## 2022-04-26 PROCEDURE — 2580000003 HC RX 258: Performed by: PHYSICIAN ASSISTANT

## 2022-04-26 PROCEDURE — 2500000003 HC RX 250 WO HCPCS: Performed by: PHYSICIAN ASSISTANT

## 2022-04-26 PROCEDURE — 94761 N-INVAS EAR/PLS OXIMETRY MLT: CPT

## 2022-04-26 PROCEDURE — 2700000000 HC OXYGEN THERAPY PER DAY

## 2022-04-26 PROCEDURE — 93005 ELECTROCARDIOGRAM TRACING: CPT | Performed by: STUDENT IN AN ORGANIZED HEALTH CARE EDUCATION/TRAINING PROGRAM

## 2022-04-26 RX ORDER — ONDANSETRON 2 MG/ML
4 INJECTION INTRAMUSCULAR; INTRAVENOUS EVERY 6 HOURS PRN
Status: DISCONTINUED | OUTPATIENT
Start: 2022-04-26 | End: 2022-05-02 | Stop reason: HOSPADM

## 2022-04-26 RX ORDER — FENTANYL CITRATE 50 UG/ML
50 INJECTION, SOLUTION INTRAMUSCULAR; INTRAVENOUS
Status: DISCONTINUED | OUTPATIENT
Start: 2022-04-26 | End: 2022-05-02 | Stop reason: HOSPADM

## 2022-04-26 RX ORDER — FENTANYL CITRATE 50 UG/ML
25 INJECTION, SOLUTION INTRAMUSCULAR; INTRAVENOUS
Status: DISCONTINUED | OUTPATIENT
Start: 2022-04-26 | End: 2022-05-02 | Stop reason: HOSPADM

## 2022-04-26 RX ORDER — SODIUM CHLORIDE 0.9 % (FLUSH) 0.9 %
5-40 SYRINGE (ML) INJECTION PRN
Status: DISCONTINUED | OUTPATIENT
Start: 2022-04-26 | End: 2022-05-02 | Stop reason: HOSPADM

## 2022-04-26 RX ORDER — SODIUM CHLORIDE 0.9 % (FLUSH) 0.9 %
5-40 SYRINGE (ML) INJECTION EVERY 12 HOURS SCHEDULED
Status: DISCONTINUED | OUTPATIENT
Start: 2022-04-26 | End: 2022-05-02 | Stop reason: HOSPADM

## 2022-04-26 RX ORDER — POLYETHYLENE GLYCOL 3350 17 G/17G
17 POWDER, FOR SOLUTION ORAL DAILY
Status: DISCONTINUED | OUTPATIENT
Start: 2022-04-26 | End: 2022-05-02 | Stop reason: HOSPADM

## 2022-04-26 RX ORDER — KETAMINE HYDROCHLORIDE 50 MG/ML
1 INJECTION, SOLUTION, CONCENTRATE INTRAMUSCULAR; INTRAVENOUS ONCE
Status: DISCONTINUED | OUTPATIENT
Start: 2022-04-26 | End: 2022-04-26

## 2022-04-26 RX ORDER — SODIUM CHLORIDE 9 MG/ML
25 INJECTION, SOLUTION INTRAVENOUS PRN
Status: DISCONTINUED | OUTPATIENT
Start: 2022-04-26 | End: 2022-05-02 | Stop reason: HOSPADM

## 2022-04-26 RX ORDER — KETAMINE HCL IN NACL, ISO-OSM 100MG/10ML
1 SYRINGE (ML) INJECTION ONCE
Status: COMPLETED | OUTPATIENT
Start: 2022-04-26 | End: 2022-04-26

## 2022-04-26 RX ORDER — FAMOTIDINE 10 MG/ML
20 INJECTION, SOLUTION INTRAVENOUS 2 TIMES DAILY
Status: DISCONTINUED | OUTPATIENT
Start: 2022-04-26 | End: 2022-04-28 | Stop reason: ALTCHOICE

## 2022-04-26 RX ORDER — ACETAMINOPHEN 325 MG/1
650 TABLET ORAL EVERY 4 HOURS PRN
Status: DISCONTINUED | OUTPATIENT
Start: 2022-04-26 | End: 2022-05-02 | Stop reason: HOSPADM

## 2022-04-26 RX ORDER — SENNA PLUS 8.6 MG/1
1 TABLET ORAL DAILY PRN
Status: DISCONTINUED | OUTPATIENT
Start: 2022-04-26 | End: 2022-05-02 | Stop reason: HOSPADM

## 2022-04-26 RX ORDER — BISACODYL 10 MG
10 SUPPOSITORY, RECTAL RECTAL DAILY
Status: DISCONTINUED | OUTPATIENT
Start: 2022-04-26 | End: 2022-05-02 | Stop reason: HOSPADM

## 2022-04-26 RX ORDER — ONDANSETRON 4 MG/1
4 TABLET, ORALLY DISINTEGRATING ORAL EVERY 8 HOURS PRN
Status: DISCONTINUED | OUTPATIENT
Start: 2022-04-26 | End: 2022-05-02 | Stop reason: HOSPADM

## 2022-04-26 RX ORDER — SODIUM CHLORIDE 9 MG/ML
INJECTION, SOLUTION INTRAVENOUS CONTINUOUS
Status: DISCONTINUED | OUTPATIENT
Start: 2022-04-26 | End: 2022-04-29

## 2022-04-26 RX ADMIN — SODIUM CHLORIDE, PRESERVATIVE FREE 10 ML: 5 INJECTION INTRAVENOUS at 22:34

## 2022-04-26 RX ADMIN — Medication 50 MG: at 18:01

## 2022-04-26 RX ADMIN — ACETAMINOPHEN 650 MG: 325 TABLET ORAL at 22:32

## 2022-04-26 RX ADMIN — SODIUM CHLORIDE: 9 INJECTION, SOLUTION INTRAVENOUS at 22:43

## 2022-04-26 RX ADMIN — FAMOTIDINE 20 MG: 10 INJECTION, SOLUTION INTRAVENOUS at 22:32

## 2022-04-26 RX ADMIN — SENNOSIDES 8.6 MG: 8.6 TABLET, COATED ORAL at 22:32

## 2022-04-26 RX ADMIN — FENTANYL CITRATE 25 MCG: 50 INJECTION INTRAMUSCULAR; INTRAVENOUS at 22:31

## 2022-04-26 RX ADMIN — POLYETHYLENE GLYCOL 3350 17 G: 17 POWDER, FOR SOLUTION ORAL at 22:47

## 2022-04-26 ASSESSMENT — PAIN DESCRIPTION - FREQUENCY: FREQUENCY: CONTINUOUS

## 2022-04-26 ASSESSMENT — ENCOUNTER SYMPTOMS
RHINORRHEA: 0
ABDOMINAL PAIN: 0
VOMITING: 0
CHEST TIGHTNESS: 0
SINUS PAIN: 0
SINUS PRESSURE: 0
EYE DISCHARGE: 0
EYE ITCHING: 0
EYE REDNESS: 0
NAUSEA: 0
BACK PAIN: 0
COLOR CHANGE: 0
DIARRHEA: 0
ABDOMINAL DISTENTION: 0
SHORTNESS OF BREATH: 0

## 2022-04-26 ASSESSMENT — PAIN DESCRIPTION - LOCATION
LOCATION: ANKLE

## 2022-04-26 ASSESSMENT — PAIN SCALES - GENERAL
PAINLEVEL_OUTOF10: 6

## 2022-04-26 ASSESSMENT — PAIN DESCRIPTION - ONSET: ONSET: ON-GOING

## 2022-04-26 ASSESSMENT — PAIN - FUNCTIONAL ASSESSMENT
PAIN_FUNCTIONAL_ASSESSMENT: 0-10
PAIN_FUNCTIONAL_ASSESSMENT: PREVENTS OR INTERFERES WITH ALL ACTIVE AND SOME PASSIVE ACTIVITIES

## 2022-04-26 ASSESSMENT — PAIN DESCRIPTION - ORIENTATION
ORIENTATION: RIGHT
ORIENTATION: RIGHT

## 2022-04-26 ASSESSMENT — PAIN DESCRIPTION - DESCRIPTORS
DESCRIPTORS: ACHING
DESCRIPTORS: ACHING

## 2022-04-26 ASSESSMENT — PAIN DESCRIPTION - PAIN TYPE: TYPE: ACUTE PAIN

## 2022-04-26 NOTE — H&P
Department of Podiatric Surgery  History and Physical        CHIEF COMPLAINT:  Right ankle fracture    Reason for Admission:  Right ankle fracture    History Obtained From:  ER Physician and Resident, reason patient could not give history:  Short term memory loss    HISTORY OF PRESENT ILLNESS:      The patient is a 45 y.o. female with significant past medical history of Wernicke-Korsakoff syndrome, paroxysmal atrial fibrillation, major depressive disorder, and GERD who is seen on behalf of Dr. Alessia Church and presents with complaints of right ankle pain. Patient has short term memory loss and secondary to this cannot provide much history of present illness. Per ER Physician and Resident, patient sustained a fall at a nursing facility earlier today. It did not appear that she sustained any other injuries. She was then brought to [facility] for further evaluation and management. At this point in time. Patient says that she does not have any pain in her right ankle. She does state that it is uncomfortable and is wondering if there is anything that we can do to make it feel better. She denies any pain elsewhere. Patient endorses sensation to her right foot. Patient denies any nausea, vomiting, fever, chills, chest pain, shortness of breath. No other pedal complaints.     Past Medical History:        Diagnosis Date    Anxiety     GERD (gastroesophageal reflux disease)     History of pneumonia 07/2018    Legionella requiring intubation    Hyperlipemia     Hypertension     Moderate episode of recurrent major depressive disorder ([facility] [address] 1/12/2021    Obesity     Paroxysmal atrial fibrillation ([facility] [address] 07/2018    during pneumonia hospitalization    Wernicke-Korsakoff syndrome ([facility] [address] 1/12/2021     Past Surgical History:        Procedure Laterality Date    APPENDECTOMY      age 11    BARIATRIC SURGERY  05/25/2015    COLONOSCOPY  8/4/2010    DILATION AND CURETTAGE      KNEE ARTHROSCOPY Right     OVARY REMOVAL Right 2009    cyst    SINUS SURGERY      TUBAL LIGATION Bilateral 2009    WISDOM TOOTH EXTRACTION       Immunizations:              Tetanus:  unknown              Medications Prior to Admission:   Not in a hospital admission. Allergies:  Bactrim [sulfamethoxazole-trimethoprim], Codeine, and Flagyl [metronidazole]    Social History:   TOBACCO:   reports that she has never smoked. She has never used smokeless tobacco.  ETOH:   reports current alcohol use of about 3.0 standard drinks of alcohol per week. DRUGS:   reports no history of drug use. Family History:       Problem Relation Age of Onset    Endometrial Cancer Mother     Diabetes Father     High Blood Pressure Brother      REVIEW OF SYSTEMS:  Pertinent ROS in HPI    PHYSICAL EXAM:  VITALS:  /87   Pulse 55   Temp 98 °F (36.7 °C) (Oral)   Resp 17   Ht 5' 8\" (1.727 m)   Wt 180 lb (81.6 kg)   LMP 05/01/2016   SpO2 100%   BMI 27.37 kg/m²   CONSTITUTIONAL:  awake, alert, cooperative, no apparent distress, and appears stated age    LOWER EXTREMITY:    VASCULAR: Dorsalis pedis pulse palpable right foot, posterior tibial pulse nonpalpable secondary to edema. Pedal hair growth positive. Skin temperature is warm to warm from tibial tuberosity to distal digits. Cap fill time is within normal limits to all exposed digits of the right foot. Quality of skin is normal.  MUSCULOSKELETAL: Patient has obvious swelling about the right ankle. There is bony prominence along the medial aspect of the ankle. There is edema and ecchymosis in the area of the right ankle. No pain on palpation of right ankle. No pain on palpation of proximal tibia or fibula or right foot.   NEUROLOGIC: Protective sensation is absent to the right foot as evidenced by the lack of pain on palpation of the displaced right ankle fracture  SKIN: There are no open lesions    IMAGING:  XR ANKLE RIGHT (MIN 3 VIEWS)    Result Date: 4/26/2022  PROCEDURE:XR ANKLE RIGHT (MIN 3 VIEWS) CLINICAL INFORMATION: Fall, right ankle swelling TECHNIQUE: 3 standard views of the right ankle were obtained. FINDINGS:  There is a fracture/subluxation of the ankle. The talus is displaced posteriorly and laterally about 17 mm. There is a mildly comminuted fracture through the base of the medial malleolus. The major distal malleolar fragment displaced laterally  with the displaced talus. An oblique fracture involves the lateral malleolus. The distal lateral malleolus fragment is also displaced laterally with the displaced talus. Cannot exclude a posterior malleolar fracture. Moderate soft tissue swelling overlies the ankle medially, laterally, and anteriorly. Fracture/subluxation of the ankle involving at least the lateral and medial malleoli. As mentioned above, cannot definitely exclude an associated posterior malleolar fracture. CT of the ankle would be helpful for further evaluation. **This report has been created using voice recognition software. It may contain minor errors which are inherent in voice recognition technology. ** Final report electronically signed by Dr. Khalif Link on 4/26/2022 3:14 PM    CT ANKLE RIGHT WO CONTRAST    Result Date: 4/26/2022  PROCEDURE: NONCONTRAST CT RIGHT ANKLE: CLINICAL INFORMATION: fracture TECHNIQUE: Multiple axial 3 mm images of the right ankle were obtained without administration of intravenous contrast material. Computer generated sagittal and coronal images of the right ankle were also reconstructed. ALL CT SCANS AT THIS FACILITY use dose modulation, iterative reconstruction, and/or weight-based dosing when appropriate to reduce radiation dose to as low as reasonably achievable. FINDINGS: There is a mildly comminuted displaced medial malleolar fracture and a mildly displaced oblique fracture of the lateral malleolus. The distal medial lateral malleolar fragments are displaced laterally with the laterally subluxed talus, approximately 7 mm.  The distal lateral malleolus fragment is also displaced posteriorly, also approximately 7 mm. the talus is also mildly subluxed posteriorly relative to the distal tibia. There is also a small chip fracture of the posterior malleolus with only slight displacement. Trimalleolar fracture subluxation. **This report has been created using voice recognition software. It may contain minor errors which are inherent in voice recognition technology. ** Final report electronically signed by Dr. Luís Griggs on 4/26/2022 4:53 PM      LABS:  CBC:   Lab Results   Component Value Date    WBC 11.9 04/26/2022    RBC 4.67 04/26/2022    HGB 13.4 04/26/2022    HCT 42.8 04/26/2022    MCV 91.6 04/26/2022    MCH 28.7 04/26/2022    MCHC 31.3 04/26/2022    RDW 14.6 07/17/2018     04/26/2022    MPV 11.6 04/26/2022     BMP:    Lab Results   Component Value Date     04/26/2022    K 4.8 04/26/2022     04/26/2022    CO2 25 04/26/2022    BUN 14 04/26/2022    LABALBU 3.9 04/26/2022    CREATININE 0.7 04/26/2022    CALCIUM 9.2 04/26/2022    GFRAA >60 09/28/2019    LABGLOM 88 04/26/2022    GLUCOSE 106 04/26/2022     PT/INR:  No results found for: PROTIME, INR     Treatment:   Orders Placed This Encounter   Procedures    XR ANKLE RIGHT (MIN 3 VIEWS)     Standing Status:   Standing     Number of Occurrences:   1     Order Specific Question:   Reason for exam:     Answer:   Fall, right ankle swelling    CT ANKLE RIGHT WO CONTRAST     Standing Status:   Standing     Number of Occurrences:   1     Order Specific Question:   Reason for exam:     Answer:   fracture     Order Specific Question:   Decision Support Exception - unselect if not a suspected or confirmed emergency medical condition     Answer:   Emergency Medical Condition (MA) [1]    CBC with Auto Differential     Standing Status:   Standing     Number of Occurrences:   1    Troponin     Standing Status:   Standing     Number of Occurrences:   1    Comprehensive Metabolic Panel w/ Reflex to MG     Standing Status:   Standing     Number of Occurrences:   1    Brain Natriuretic Peptide     Standing Status:   Standing     Number of Occurrences:   1    Anion Gap     Standing Status:   Standing     Number of Occurrences:   1    Osmolality     Standing Status:   Standing     Number of Occurrences:   1    Glomerular Filtration Rate, Estimated     Standing Status:   Standing     Number of Occurrences:   1    Ice to affected area     Standing Status:   Standing     Number of Occurrences:   1    EKG 12 Lead     Standing Status:   Standing     Number of Occurrences:   1     Order Specific Question:   Reason for Exam?     Answer: Fall     ASSESSMENT AND PLAN:  1. Closed displaced trimalleolar ankle fracture, right  Patient seen and evaluated   X-rays reviewed, see report above. Patient has a displaced trimalleolar ankle fracture, right ankle   Labs reviewed, white blood cell count 11.9, hemoglobin 13.4, troponin less than 0.01   Discussed with patient the need for reduction of the ankle to reduce pressure on the skin and further damage to the joint, patient agrees   Closed reduction performed in the ER, post reduction films demonstrate improved position of ankle joint with satisfactory reduction  Discussed with patient that this is a fracture that is unstable and will need surgical fixation   Discussed with patient plan that we are going to have her admitted per trauma, and then will perform ORIF of her right trimalleolar ankle fracture. Patient is in agreement  N. p.o. at midnight   Hold anticoagulation   Verify informed consent: Open reduction internal fixation right trimalleolar ankle fracture   Trauma to consult hospitalist for surgical risk stratification   Case is scheduled for approximately 4:00, to follow Dr. Miller Eis other cases for the day   Podiatry will continue to follow with plans for surgical fixation of ankle fracture tomorrow 4/27/2022    DISPO: OR 4/27/2022 for ORIF trimalleolar ankle fracture right    Thanks for the opportunity to participate in this patient's care.      Vicente Powell DPM DPM

## 2022-04-26 NOTE — ED PROVIDER NOTES
ATTENDING NOTE:    I supervised and discussed the history, physical exam and the management of this patient with the resident. I reviewed the resident's note and agree with the documented findings and plan of care. Please see my additional note. Critical Care  Performed by: Eva Penn MD  Authorized by: Selywn Ramires MD     Critical care provider statement:     Critical care time (minutes):  35    Critical care time was exclusive of:  Separately billable procedures and treating other patients and teaching time    Critical care was necessary to treat or prevent imminent or life-threatening deterioration of the following conditions:  Trauma    Critical care was time spent personally by me on the following activities:  Ordering and performing treatments and interventions, ordering and review of laboratory studies, ordering and review of radiographic studies, pulse oximetry, re-evaluation of patient's condition, development of treatment plan with patient or surrogate, discussions with consultants, discussions with primary provider, evaluation of patient's response to treatment, review of old charts, obtaining history from patient or surrogate and examination of patient    I assumed direction of critical care for this patient from another provider in my specialty: no    Comments:      Patient with history of short term memory loss, facility contacted for details regarding fall, consult to podiatry and trauma for admission, sedation provided for ankle fracture reduction, patient not candidate for discharge as due to short term memory loss would be unable to comply with NWB status. I personally saw and examined the patient. I have reviewed and agree with the resident's findings, including all diagnostic interpretations and treatment plans as written. I was present for the key portion of any procedures performed and the inclusive time noted in any critical care statement.     Electronically verified by Willim Bloch, MD  04/26/22 [id number]       Estevan Lala MD  04/26/22 [id number]

## 2022-04-26 NOTE — ED NOTES
Pt transferred to [address] [place] [address] in stable condition.      Ortega Bowman  04/26/22 [id number]

## 2022-04-26 NOTE — ED NOTES
ED to inpatient nurses report    Chief Complaint   Patient presents with    Ankle Injury      Present to ED from nursing home  LOC: alert and orientated to name, place, date  Vital signs   Vitals:    04/26/22 1809 04/26/22 1814 04/26/22 1819 04/26/22 1824   BP: (!) 138/97 122/83 (!) 137/94 (!) 132/98   Pulse: 70 59 77 63   Resp: 16 16 14 14   Temp:       TempSrc:       SpO2: 99% 100% 100% 100%   Weight:       Height:          Oxygen Baseline Room Air    Current needs required Room Air Bipap/Cpap No  LDAs:   Peripheral IV 04/26/22 Left Antecubital (Active)     Mobility: Requires assistance * 1  Pending ED orders: NA  Present condition: Pt resting on cot in position of comfort. Pt is A&Ox4, resps easy and unlabored. IV shows no s/s of infection or infiltration. Pt denies pain unless moved.   Person of contract Hector Moya, mother , phone number [phone]  Our promise was given to patient and family    Electronically signed by Es Mehta RN on 4/26/2022 at 6:51 PM       Es Mehta RN  04/26/22 [id number]

## 2022-04-26 NOTE — ED PROVIDER NOTES
[facility] ENCOUNTER          Pt Name: Annmarie Butler  MRN: [id number]  [place] 1970  Date of evaluation: 4/26/2022  Treating Resident Physician: Linnea Rascon DO  Supervising Physician: Bull Higginbotham MD    CHIEF COMPLAINT       Chief Complaint   Patient presents with    Ankle Injury     History obtained from the patient, EMS and assisted living. HISTORY OF PRESENT ILLNESS        Annmarie Butler is a 45 y.o. female who presents to the emergency department for evaluation of fall and ankle injury. The patient states that she was walking with her friend, Florencia Meng, outside at the [address] She reports that she lives at assisted living because of short-term memory loss. She has a history of Wernicke's encephalopathy. She does not know how she fell and believes that she did not lose consciousness. She states that she did not try standing and walking after standing and is complaining of a right ankle injury. The patient denies symptoms prior to falling. One of the nurses called EMS who gave 50 mcg of fentanyl en route, placed the patient's right ankle in a moldable splint, and brought the patient to the hospital.    Additional history was obtained from the assisted living facility and speaking with Roxi Gaffney, one of the employees there. She stated that the patient was found sitting in the grass after falling. The patient's friend reported that she was talking entire time, did not hit her head, and did not lose consciousness. She stated that the patient is normally independent of her ADLs but needs help getting reminders to take her medications. Patient denies smoking, drinking alcohol, and illicit drug use. The patient has no other acute complaints at this time. REVIEW OF SYSTEMS   Review of Systems   Constitutional: Negative for fever. HENT: Negative for rhinorrhea, sinus pressure and sinus pain.     Eyes: Negative for discharge, redness and itching. Respiratory: Negative for chest tightness and shortness of breath. Cardiovascular: Negative for chest pain. Gastrointestinal: Negative for abdominal distention, abdominal pain, diarrhea, nausea and vomiting. Genitourinary: Negative for difficulty urinating, dysuria, frequency, hematuria and urgency. Musculoskeletal: Positive for arthralgias and joint swelling. Skin: Negative for color change and pallor. Neurological: Negative for dizziness, light-headedness and headaches.          PAST MEDICAL AND SURGICAL HISTORY     Past Medical History:   Diagnosis Date    Anxiety     GERD (gastroesophageal reflux disease)     History of pneumonia 07/2018    Legionella requiring intubation    Hyperlipemia     Hypertension     Moderate episode of recurrent major depressive disorder ([facility] [address] 1/12/2021    Obesity     Paroxysmal atrial fibrillation ([facility] [address] 07/2018    during pneumonia hospitalization    Wernicke-Korsakoff syndrome ([facility] [address] 1/12/2021     Past Surgical History:   Procedure Laterality Date    APPENDECTOMY      age 11    BARIATRIC SURGERY  05/25/2015    COLONOSCOPY  8/4/2010    DILATION AND CURETTAGE      KNEE ARTHROSCOPY Right     OVARY REMOVAL Right 2009    cyst    SINUS SURGERY      TUBAL LIGATION Bilateral 2009    WISDOM TOOTH EXTRACTION           MEDICATIONS     Current Facility-Administered Medications:     0.9 % sodium chloride infusion, , IntraVENous, Continuous, Kem Chandra, PA    sodium chloride flush 0.9 % injection 5-40 mL, 5-40 mL, IntraVENous, 2 times per day, Eileen Blower Pappa, PA    sodium chloride flush 0.9 % injection 5-40 mL, 5-40 mL, IntraVENous, PRN, Eileen Blower Pappa, PA    0.9 % sodium chloride infusion, 25 mL, IntraVENous, PRN, Eileen Litawer Pappa, PA    acetaminophen (TYLENOL) tablet 650 mg, 650 mg, Oral, Q4H PRN, Eileen Blower Pappa, PA    famotidine (PEPCID) injection 20 mg, 20 mg, IntraVENous, BID, BHASKAR Conway    ondansetron (ZOFRAN-ODT) disintegrating tablet 4 mg, 4 mg, Oral, Q8H PRN **OR** ondansetron (ZOFRAN) injection 4 mg, 4 mg, IntraVENous, Q6H PRN, Papa Chandra PA    polyethylene glycol (GLYCOLAX) packet 17 g, 17 g, Oral, Daily, Papa Graypa, PA    bisacodyl (DULCOLAX) suppository 10 mg, 10 mg, Rectal, Daily, Papa Chandra PA    senna (SENOKOT) tablet 8.6 mg, 1 tablet, Oral, Daily PRN, Papa Medjameson Chandra PA    fentaNYL (SUBLIMAZE) injection 25 mcg, 25 mcg, IntraVENous, Q1H PRN **OR** fentaNYL (SUBLIMAZE) injection 50 mcg, 50 mcg, IntraVENous, Q1H PRN, BHASKAR Elizalde      SOCIAL HISTORY     Social History     Social History Narrative    Not on file     Social History     Tobacco Use    Smoking status: Never Smoker    Smokeless tobacco: Never Used   Vaping Use    Vaping Use: Never used   Substance Use Topics    Alcohol use: Not Currently     Alcohol/week: 3.0 standard drinks     Types: 3 Glasses of wine per week     Comment: occasional    Drug use: No         ALLERGIES     Allergies   Allergen Reactions    Bactrim [Sulfamethoxazole-Trimethoprim]     Codeine Anxiety    Flagyl [Metronidazole] Nausea Only         FAMILY HISTORY     Family History   Problem Relation Age of Onset    Endometrial Cancer Mother     Diabetes Father     High Blood Pressure Brother          PREVIOUS RECORDS   Previous records reviewed        PHYSICAL EXAM     ED Triage Vitals [04/26/22 1409]   BP Temp Temp Source Pulse Resp SpO2 Height Weight   118/82 98 °F (36.7 °C) Oral 55 17 96 % 5' 8\" (1.727 m) 180 lb (81.6 kg)     Initial vital signs and nursing assessment reviewed and abnormal from [facility]. Body mass index is 29.32 kg/m². Pulsoximetry is normal per my interpretation. Additional Vital Signs:  Vitals:    04/26/22 1824   BP: (!) 132/98   Pulse: 63   Resp: 14   Temp:    SpO2: 100%       Physical Exam  Vitals and nursing note reviewed. Constitutional:       General: She is not in acute distress. Appearance: Normal appearance.  She is not ill-appearing. HENT:      Head: Normocephalic and atraumatic. Nose: Nose normal. No congestion or rhinorrhea. Mouth/Throat:      Mouth: Mucous membranes are moist.      Pharynx: Oropharynx is clear. No oropharyngeal exudate or posterior oropharyngeal erythema. Eyes:      Extraocular Movements: Extraocular movements intact. Pupils: Pupils are equal, round, and reactive to light. Neck:      Comments: No midline cervical spine tenderness. Cardiovascular:      Pulses: Normal pulses. Heart sounds: Normal heart sounds. Pulmonary:      Effort: Pulmonary effort is normal.      Breath sounds: Normal breath sounds. Abdominal:      General: Abdomen is flat. There is no distension. Palpations: Abdomen is soft. Tenderness: There is no abdominal tenderness. Musculoskeletal:         General: Swelling, tenderness and deformity present. Cervical back: Normal range of motion and neck supple. No rigidity or tenderness. Comments: Range of motion and pulse, motor, and sensation present to the bilateral upper extremities. Pelvis stable. Hip range of motion normal bilaterally. There is a deformity and swelling over the medial aspect of the right ankle. No tenderness to the proximal right leg. There is decreased range of motion of the right ankle due to pain. Pulse, motor, and sensation present to the bilateral lower extremities. Skin:     General: Skin is warm and dry. Neurological:      General: No focal deficit present. Mental Status: She is alert and oriented to person, place, and time. Mental status is at baseline. MEDICAL DECISION MAKING   Initial Assessment:   3 54-year-old female presents to the emergency department for evaluation of fall, likely nonsyncopal in nature. 2. Signs of right ankle injury. 3. Differential diagnosis includes is not limited to: Anemia, electrolyte abnormality, ACS. Plan:    IV, labs, EKG.  Right ankle x-ray.    Ice applied to right ankle. ED RESULTS   Laboratory results:  Labs Reviewed   CBC WITH AUTO DIFFERENTIAL - Abnormal; Notable for the following components:       Result Value    WBC 11.9 (*)     MCHC 31.3 (*)     Segs Absolute 8.3 (*)     All other components within normal limits   COMPREHENSIVE METABOLIC PANEL W/ REFLEX TO MG FOR LOW K - Abnormal; Notable for the following components: Total Bilirubin 0.2 (*)     ALT 9 (*)     All other components within normal limits   GLOMERULAR FILTRATION RATE, ESTIMATED - Abnormal; Notable for the following components:    Est, Glom Filt Rate 88 (*)     All other components within normal limits   TROPONIN   BRAIN NATRIURETIC PEPTIDE   ANION GAP   OSMOLALITY   COMPREHENSIVE METABOLIC PANEL W/ REFLEX TO MG FOR LOW K   CBC WITH AUTO DIFFERENTIAL       Radiologic studies results:  XR ANKLE RIGHT (2 VIEWS)   Final Result   1. Stable alignment of trimalleolar fracture of the right ankle, narrowing    cast.      This document has been electronically signed by: Iman Blakely MD on    04/26/2022 06:40 PM      XR ANKLE RIGHT (2 VIEWS)   Final Result   1. Improved alignment of minimally displaced trimalleolar fractures of the    right ankle. 2. Diffuse soft tissue edema overlying the right ankle. This document has been electronically signed by: Iman Blakely MD on    04/26/2022 06:53 PM      CT ANKLE RIGHT WO CONTRAST   Final Result   Trimalleolar fracture subluxation. **This report has been created using voice recognition software. It may contain minor errors which are inherent in voice recognition technology. **      Final report electronically signed by Dr. Jun Gan on 4/26/2022 4:53 PM      XR ANKLE RIGHT (MIN 3 VIEWS)   Final Result   Fracture/subluxation of the ankle involving at least the lateral and medial malleoli. As mentioned above, cannot definitely exclude an associated posterior malleolar fracture.  CT of the ankle would be helpful for further evaluation. **This report has been created using voice recognition software. It may contain minor errors which are inherent in voice recognition technology. **      Final report electronically signed by Dr. Natanael Smith on 4/26/2022 3:14 PM          ED Medications administered this visit:   Medications   0.9 % sodium chloride infusion (has no administration in time range)   sodium chloride flush 0.9 % injection 5-40 mL (has no administration in time range)   sodium chloride flush 0.9 % injection 5-40 mL (has no administration in time range)   0.9 % sodium chloride infusion (has no administration in time range)   acetaminophen (TYLENOL) tablet 650 mg (has no administration in time range)   famotidine (PEPCID) injection 20 mg (has no administration in time range)   ondansetron (ZOFRAN-ODT) disintegrating tablet 4 mg (has no administration in time range)     Or   ondansetron (ZOFRAN) injection 4 mg (has no administration in time range)   polyethylene glycol (GLYCOLAX) packet 17 g (has no administration in time range)   bisacodyl (DULCOLAX) suppository 10 mg (has no administration in time range)   senna (SENOKOT) tablet 8.6 mg (has no administration in time range)   fentaNYL (SUBLIMAZE) injection 25 mcg (has no administration in time range)     Or   fentaNYL (SUBLIMAZE) injection 50 mcg (has no administration in time range)   ketamine (KETALAR) injection 81.6 mg (50 mg IntraVENous Given 4/26/22 1801)         ED COURSE        Procedural sedation    Date/Time: 4/26/2022 6:01 PM  Performed by: Tati Vazquez DO  Authorized by: Patito Claros MD     Consent:     Consent obtained:  Written    Consent given by:  Patient    Risks discussed:  Respiratory compromise necessitating ventilatory assistance and intubation  Indications:     Procedure performed:  Fracture reduction    Procedure necessitating sedation performed by:  Different physician    Intended level of sedation:  Moderate (conscious sedation)  Pre-sedation assessment:     ASA classification: class 2 - patient with mild systemic disease      Neck mobility: normal      Mouth openin finger widths    Mallampati score:  III - soft palate, base of uvula visible    Pre-sedation assessments completed and reviewed: airway patency, cardiovascular function, mental status and respiratory function    Immediate pre-procedure details:     Reassessment: Patient reassessed immediately prior to procedure      Reviewed: vital signs and relevant labs/tests      Verified: bag valve mask available, intubation equipment available and IV patency confirmed    Procedure details (see MAR for exact dosages):     Sedation start time:  2022 6:01 PM    Preoxygenation:  Room air    Sedation:  Ketamine    Intra-procedure monitoring:  Blood pressure monitoring, continuous capnometry, continuous pulse oximetry, cardiac monitor, frequent LOC assessments and frequent vital sign checks    Intra-procedure events: none    Post-procedure details:     Patient tolerance: Tolerated well, no immediate complications      Initial right ankle x-ray was remarkable for a fracture/subluxation and a follow-up right CT ankle was ordered to assess for a posterior component which showed a trimalleolar fracture subluxation. Dr. Jarad Early with the podiatry service was consulted who saw and evaluated the patient in the emergency department and the podiatry service reduced the patient's ankle fracture and applied a right ankle splint. They are planning for ORIF of the right ankle fracture tomorrow. We will plan to admit the patient to the hospital given that she has short-term memory loss, and unstable fracture, and would likely not be able to maintain nonweightbearing of the right lower extremity at assisted living. I discussed admission to the hospital with the patient who verbalized understanding and all questions were answered.   This case was discussed with Dr. Ayana Rich, who accepted the patient for admission to the hospital.    MEDICATION CHANGES     Current Discharge Medication List            FINAL DISPOSITION     Final diagnoses:   Fall, initial encounter   Closed trimalleolar fracture of right ankle, initial encounter     Condition: condition: stable  Dispo: Admit to trauma surgery      This transcription was electronically signed. Parts of this transcriptions may have been dictated by use of voice recognition software and electronically transcribed, and parts may have been transcribed with the assistance of an ED scribe. The transcription may contain errors not detected in proofreading. Please refer to my supervising physician's documentation if my documentation differs.     Electronically Signed: Kathya La DO, 04/26/22, 9:55 PM         Kathya La DO  Resident  04/26/22 3516

## 2022-04-26 NOTE — H&P
Trauma H&P     Patient:  Delta Padron  Admit date: 4/26/2022   YOB: 1970 Date of Evaluation: 4/26/2022  MRN: [id number]  Acct: [de-identified]    Injury Date: 4/26/2022  injury time: Morning  PCP: Chula Mcdonnell MD   Referring physician: Dr. Glen Roebrtson    Time of Trauma Surgeon Notification: [phone]  Time of BANG Arrival: 1730  Time of Trauma Surgeon Arrival:     A. fib:    Principal Problem:    Trimalleolar fracture of right ankle, closed, initial encounter  Resolved Problems:    * No resolved hospital problems.  *    Plan:    Patient admitted under [address], [address] by fall    Right trimalleolar fracture   - Consult podiatry   - Pain control   -Right ankle fracture reduced by procedural sedation and splinted in ED by podiatry   -Podiatry planning surgery tomorrow   -N.p.o. at midnight and hold blood thinning medications   -PT/OT when cleared by podiatry    Consults podiatry    Pain Management   -Morphine, Norco    Prophylaxis: SCD's, Incentive Spirometry, GlycoLax, Pepcid, Zofran    General Diet, n.p.o. at midnight    IVF Management  Regular Neurovascular Checks  Repeat Labs Tomorrow AM  PT/OT/SLP Eval and Treat  Activity as tolerated, pt up with assistance    Planned Discharge discharge pending clinical course      Activation:    [] Level I (Trauma Alert)   [] Level II (Injury Call)   [x] Level III (Trauma Consult)   [] Downgraded (Time: )   Mode of Arrival: EMS transportation  Referring Facility:   Loss of Consciousness [x]No []Yes[]Unknown  Duration(min):  Mechanism of Injury:  []Motor Vehicle crash   []Single Vehicle [] []Passenger []Scene Fatality []Front Seat  []Restrained   []Air Bag Deployed   []Ejected []Rollover []Pedestrian []Trapped   Type of vehicle:   Protective Devices:   []Motorcycle  Wearing Helmet []Yes []No  []Bicycle  Wearing Helmet []Yes []No  [x]Fall   Distance -standing   []Assault    Abuse Reported []Yes []No  []Gunshot  []Stabbing  []Work Related  []Burn: []Flame []Scald []Electrical []Chemical []Contact []Inhalation []House Fire  []Other:     Specialties contacted for 30 minute response: None required    Subjective   Chief Complaint: Right ankle feels strange    History of Present Illness:    She is a 45 y.o. female evaluated for trauma consult, brought by EMS following a mechanical with no LOC; past medical history short-term memory loss, Warnicke Korsakoff syndrome, A. fib, HTN, anxiety, major depressive disorder. Per report patient was walking with another individual when she fell on the grass and was unable to stand up and ambulate following incident. She states she has no pain, just that her right ankle feels \"weird\". Patient reports odd sensation in right ankle. Patient denies chest pain, shortness of breath, cough, headache, dizziness, lightheadedness, numbness, paraesthesias, weakness, chills, fevers, abdominal pain, nausea, vomiting, diarrhea, blood in stool, neck pain, or back pain. Care in coordination with trauma surgeon Dr. Priscilla Arguello. Review of Systems:   Review of Systems   Constitutional: Negative for chills and fever. Respiratory: Negative for chest tightness and shortness of breath. Cardiovascular: Negative for chest pain and palpitations. Gastrointestinal: Negative for abdominal pain, nausea and vomiting. Musculoskeletal: Negative for back pain and neck pain. Right ankle pain with deformity   Skin: Negative for color change, pallor and wound. Neurological: Negative for dizziness, seizures, syncope, weakness and light-headedness. Psychiatric/Behavioral: Negative for agitation and confusion. The patient is not nervous/anxious.       Bactrim [sulfamethoxazole-trimethoprim], Codeine, and Flagyl [metronidazole]  Past Surgical History:   Procedure Laterality Date    APPENDECTOMY      age 15    BARIATRIC SURGERY  05/25/2015    COLONOSCOPY  8/4/2010    DILATION AND CURETTAGE      KNEE ARTHROSCOPY Right     OVARY REMOVAL Right 2009    cyst    SINUS SURGERY      TUBAL LIGATION Bilateral 2009    WISDOM TOOTH EXTRACTION       Past Medical History:   Diagnosis Date    Anxiety     GERD (gastroesophageal reflux disease)     History of pneumonia 07/2018    Legionella requiring intubation    Hyperlipemia     Hypertension     Moderate episode of recurrent major depressive disorder ([facility] [address] 1/12/2021    Obesity     Paroxysmal atrial fibrillation ([facility] [address] 07/2018    during pneumonia hospitalization    Wernicke-Korsakoff syndrome ([facility] [address] 1/12/2021     Past Surgical History:   Procedure Laterality Date    APPENDECTOMY      age 15    BARIATRIC SURGERY  05/25/2015    COLONOSCOPY  8/4/2010    DILATION AND CURETTAGE      KNEE ARTHROSCOPY Right     OVARY REMOVAL Right 2009    cyst    SINUS SURGERY      TUBAL LIGATION Bilateral 2009    WISDOM TOOTH EXTRACTION       Social History     Socioeconomic History    Marital status:      Spouse name: None    Number of children: None    Years of education: None    Highest education level: None   Occupational History     Employer: [employer]   Tobacco Use    Smoking status: Never Smoker    Smokeless tobacco: Never Used   Vaping Use    Vaping Use: Never used   Substance and Sexual Activity    Alcohol use: Yes     Alcohol/week: 3.0 standard drinks     Types: 3 Glasses of wine per week     Comment: occasional    Drug use: No    Sexual activity: Yes     Partners: Male     Comment:  8 years. NO dyspareunia or PCB   Other Topics Concern    None   Social History Narrative    None     Social Determinants of Health     Financial Resource Strain:     Difficulty of Paying Living Expenses: Not on file   Food Insecurity:     Worried About [address] in the Last Year: Not on file    Jon of Food in the Last Year: Not on file   Transportation Needs:     Lack of Transportation (Medical): Not on file    Lack of Transportation (Non-Medical):  Not on file   Physical Activity:     Days of Exercise per Week: Not on file    Minutes of Exercise per Session: Not on file   Stress:     Feeling of Stress : Not on file   Social Connections:     Frequency of Communication with Friends and Family: Not on file    Frequency of Social Gatherings with Friends and Family: Not on file    Attends Spiritism Services: Not on file    Active Member of Clubs or Organizations: Not on file    Attends Club or Organization Meetings: Not on file    Marital Status: Not on file   Intimate Partner Violence:     Fear of Current or Ex-Partner: Not on file    Emotionally Abused: Not on file    Physically Abused: Not on file    Sexually Abused: Not on file   Housing Stability:     Unable to Pay for Housing in the Last Year: Not on file    Number of [place] in the Last Year: Not on file    Unstable Housing in the Last Year: Not on file     Family History   Problem Relation Age of Onset    Endometrial Cancer Mother     Diabetes Father     High Blood Pressure Brother        Home medications:    Previous Medications    ACETAMINOPHEN (TYLENOL) 325 MG TABLET    Take 650 mg by mouth every 6 hours as needed for Pain    ALUMINUM & MAGNESIUM HYDROXIDE-SIMETHICONE (MAALOX) 200-200-20 MG/5ML SUSP SUSPENSION    Take by mouth as needed for Indigestion    ASCORBIC ACID (VITAMIN C) 500 MG CAPS    Take 1 tablet by mouth daily    BISACODYL (DULCOLAX) 10 MG SUPPOSITORY    Place 10 mg rectally as needed for Constipation    CARBAMIDE PEROXIDE (DEBROX) 6.5 % OTIC SOLUTION    Place 5 drops into both ears as needed    DIPHENHYDRAMINE (BENADRYL) 50 MG CAPSULE    Take 50 mg by mouth every 6 hours as needed for Itching    DOCUSATE SODIUM (COLACE) 100 MG CAPSULE    Take 100 mg by mouth 3 times daily as needed for Constipation    EPINEPHRINE (EPIPEN 2-LUZ) 0.3 MG/0.3ML SOAJ INJECTION    Inject 0.3 mLs into the muscle once for 1 dose Use as directed for allergic reaction    EPINEPHRINE HCL, ANAPHYLAXIS, (EPIPEN IM)    Inject into the muscle as needed    FLUOXETINE (PROZAC) 20 MG CAPSULE    Take 20 mg by mouth daily    HYDROCORTISONE 1 % CREAM    Apply topically 2 times daily Apply topically 2 times daily to face    LORAZEPAM (ATIVAN) 1 MG TABLET    Take 1 mg by mouth every 12 hours as needed for Anxiety.     MAGNESIUM HYDROXIDE (MILK OF MAGNESIA) 400 MG/5ML SUSPENSION    Take 30 mLs by mouth as needed for Constipation    METOPROLOL SUCCINATE (TOPROL XL) 50 MG EXTENDED RELEASE TABLET    Take 50 mg by mouth daily    NUTRITIONAL SUPPLEMENTS (VITAMIN D BOOSTER PO)    Take 1,000 mcg by mouth daily    POLYETHYLENE GLYCOL (MIRALAX) 17 G PACK PACKET    Take 17 g by mouth as needed    QUETIAPINE (SEROQUEL) 50 MG TABLET    Take 50 mg by mouth nightly     SENNOSIDES (SENOKOT PO)    Take 2 tablets by mouth daily as needed    SPIRONOLACTONE (ALDACTONE) 25 MG TABLET    Take 12.5 mg by mouth daily    THIAMINE MONONITRATE 100 MG TABLET    Take 100 mg by mouth daily       Hospital medications:  Scheduled Meds:  Continuous Infusions:  PRN Meds:  Objective   ED TRIAGE VITALS  BP: 120/87, Temp: 98 °F (36.7 °C), Pulse: 55, Resp: 17, SpO2: 100 %  Cate Coma Scale  Eye Opening: Spontaneous  Best Verbal Response: Oriented  Best Motor Response: Obeys commands  [place] Coma Scale Score: 15  Results for orders placed or performed during the hospital encounter of 04/26/22   CBC with Auto Differential   Result Value Ref Range    WBC 11.9 (H) 4.8 - 10.8 thou/mm3    RBC 4.67 4.20 - 5.40 mill/mm3    Hemoglobin 13.4 12.0 - 16.0 gm/dl    Hematocrit 42.8 37.0 - 47.0 %    MCV 91.6 81.0 - 99.0 fL    MCH 28.7 26.0 - 33.0 pg    MCHC 31.3 (L) 32.2 - 35.5 gm/dl    RDW-CV 13.0 11.5 - 14.5 %    RDW-SD 43.3 35.0 - 45.0 fL    Platelets 742 401 - 766 thou/mm3    MPV 11.6 9.4 - 12.4 fL    Seg Neutrophils 70.1 %    Lymphocytes 23.1 %    Monocytes 5.4 %    Eosinophils 0.8 %    Basophils 0.3 %    Immature Granulocytes 0.3 %    Segs Absolute 8.3 (H) 1.8 - 7.7 thou/mm3    Lymphocytes Absolute 2.7 1.0 - 4.8 thou/mm3    Monocytes Absolute 0.6 0.4 - 1.3 thou/mm3    Eosinophils Absolute 0.1 0.0 - 0.4 thou/mm3    Basophils Absolute 0.0 0.0 - 0.1 thou/mm3    Immature Grans (Abs) 0.04 0.00 - 0.07 thou/mm3    nRBC 0 /100 wbc   Troponin   Result Value Ref Range    Troponin T < 0.010 ng/ml   Comprehensive Metabolic Panel w/ Reflex to MG   Result Value Ref Range    Glucose 106 70 - 108 mg/dL    CREATININE 0.7 0.4 - 1.2 mg/dL    BUN 14 7 - 22 mg/dL    Sodium 140 135 - 145 meq/L    Potassium reflex Magnesium 4.8 3.5 - 5.2 meq/L    Chloride 106 98 - 111 meq/L    CO2 25 23 - 33 meq/L    Calcium 9.2 8.5 - 10.5 mg/dL    AST 19 5 - 40 U/L    Alkaline Phosphatase 77 38 - 126 U/L    Total Protein 7.0 6.1 - 8.0 g/dL    Albumin 3.9 3.5 - 5.1 g/dL    Total Bilirubin 0.2 (L) 0.3 - 1.2 mg/dL    ALT 9 (L) 11 - 66 U/L   Brain Natriuretic Peptide   Result Value Ref Range    Pro-.5 0.0 - 900.0 pg/mL   Anion Gap   Result Value Ref Range    Anion Gap 9.0 8.0 - 16.0 meq/L   Osmolality   Result Value Ref Range    Osmolality Calc 280.3 275.0 - 300.0 mOsmol/kg   Glomerular Filtration Rate, Estimated   Result Value Ref Range    Est, Glom Filt Rate 88 (A) ml/min/1.73m2   EKG 12 Lead   Result Value Ref Range    Ventricular Rate 54 BPM    Atrial Rate 54 BPM    P-R Interval 156 ms    QRS Duration 86 ms    Q-T Interval 458 ms    QTc Calculation (Bazett) 434 ms    P Axis 38 degrees    R Axis 27 degrees    T Axis 35 degrees       Physical Exam:  Patient Vitals for the past 24 hrs:   BP Temp Temp src Pulse Resp SpO2 Height Weight   04/26/22 1445 120/87 -- -- -- -- 100 % -- --   04/26/22 1409 118/82 98 °F (36.7 °C) Oral 55 17 96 % 5' 8\" (1.727 m) 180 lb (81.6 kg)     Primary Assessment:  Airway: Patent, trachea midline  Breathing: Breath sounds present and equal bilaterally, spontaneous, and unlabored  Circulation: Hemodynamically stable, 2+ central and peripheral pulses. Disability: GAITAN x 4, following commands.  GCS =15    Secondary Assessment:  GENERAL: Presents sitting upright in bed unassisted, awake and alert; In no acute distress and well nourished  HEAD: Atraumatic, normocephalic, symmetric, without deformity. No tenderness to palpation. No raccoon eyes, pena signs or visible CSF leakage. EYES: No apparent trauma, discharge, or hematoma bilaterally. PERRL at 3mm  NECK: Neck is atraumatic, trachea midline, no visible lacerations, step off deformity, expanding swelling or midline tenderness. CARDIO: No visible chest wall deformity. No heaves or lifts. Strong/regular S1/S2 rate and rhythm. No rubs murmurs, or gallops. 2+ radial, posterior tibial, and dorsalis pedal pulses. Capillary refill <2 sec. No extremity edema noted. PULMONARY:  Trachea midline, no audible wheezing, increased respiratory effort, accessory muscle use, or asymmetrical chest wall movement. Lung sounds are clear and audible to ascultation in superior and inferior fields. ABDOMEN: Abdomen is non distended without lacerations. Abdomen soft and nontender to palpation in all quadrants. MSK: Moving all extremities. Right ankle with mild deformity and tenderness to palpation. No other deformity, contusion, or bleeding.  strength 5/5 and equal bilaterally. No tenderness to palpation at the midline of cervical, thoracic, and lumbar spine. NEURO: Follows commands, reasoning intact, recalls recent events. PMS intact, moves limbs freely. No focal neurological deficits  SKIN: Appropriate for ethnicity, warm and dry. No visible deformity, contusions, abrasions, punctures, burns, tenderness, lacerations, or swelling. Medical Decision Making: On arrival patient vital signs stable. Trimalleolar fracture evident on CT right ankle. Trauma surgery service will admit and signed over to podiatry tomorrow a.m. who are planning OR for surgical repair. Radiology:     CT ANKLE RIGHT WO CONTRAST   Final Result   Trimalleolar fracture subluxation.                **This report has been created using voice recognition software. It may contain minor errors which are inherent in voice recognition technology. **      Final report electronically signed by Dr. Ele Barrett on 4/26/2022 4:53 PM      XR ANKLE RIGHT (MIN 3 VIEWS)   Final Result   Fracture/subluxation of the ankle involving at least the lateral and medial malleoli. As mentioned above, cannot definitely exclude an associated posterior malleolar fracture. CT of the ankle would be helpful for further evaluation. **This report has been created using voice recognition software. It may contain minor errors which are inherent in voice recognition technology. **      Final report electronically signed by Dr. Ele Barrett on 4/26/2022 3:14 PM        Fast Exam: No.  Work-up performed by ED provider.     Electronically signed by BHASKAR Espinoza on 4/26/2022 at 5:07 PM    See my separate note

## 2022-04-26 NOTE — ED TRIAGE NOTES
Patient presents by EMS from [facility] due to short term memory loss. Patient states she fell about a hour ago, does not remember how or why. Patient reports she was walking outside and was on the sidewalk and was walking with a employee of [employer] of [address] [place] [address]. Patient did not stand or walk after the fall. Patient rates her pain a 6 out of 10. Patient was given Fentnyl dose \"50 at 1:15pm\" per EMS report. Attending aware and at bedside. Discomfort and swelling noted at right ankle. Ankle is secure and immobile with brace from EMS. Patient reports \"I cant really feel my ankle unless you touch it but it does not hurt. \" Patient unable to tell me who called 911. Patient alert to name, year and day. Unable to tell us location. Vitals stable, breathing with ease. Right ankle color within normal limits.

## 2022-04-27 ENCOUNTER — ANESTHESIA (OUTPATIENT)
Dept: OPERATING ROOM | Age: 52
DRG: 313 | End: 2022-04-27
Payer: MEDICAID

## 2022-04-27 ENCOUNTER — APPOINTMENT (OUTPATIENT)
Dept: GENERAL RADIOLOGY | Age: 52
DRG: 313 | End: 2022-04-27
Payer: MEDICAID

## 2022-04-27 ENCOUNTER — ANESTHESIA EVENT (OUTPATIENT)
Dept: OPERATING ROOM | Age: 52
DRG: 313 | End: 2022-04-27
Payer: MEDICAID

## 2022-04-27 VITALS — SYSTOLIC BLOOD PRESSURE: 114 MMHG | DIASTOLIC BLOOD PRESSURE: 69 MMHG | OXYGEN SATURATION: 94 %

## 2022-04-27 LAB
ALBUMIN SERPL-MCNC: 3.7 G/DL (ref 3.5–5.1)
ALP BLD-CCNC: 74 U/L (ref 38–126)
ALT SERPL-CCNC: 9 U/L (ref 11–66)
ANION GAP SERPL CALCULATED.3IONS-SCNC: 11 MEQ/L (ref 8–16)
AST SERPL-CCNC: 15 U/L (ref 5–40)
BASOPHILS # BLD: 0.3 %
BASOPHILS ABSOLUTE: 0 THOU/MM3 (ref 0–0.1)
BILIRUB SERPL-MCNC: 0.5 MG/DL (ref 0.3–1.2)
BUN BLDV-MCNC: 10 MG/DL (ref 7–22)
CALCIUM SERPL-MCNC: 8.7 MG/DL (ref 8.5–10.5)
CHLORIDE BLD-SCNC: 106 MEQ/L (ref 98–111)
CO2: 22 MEQ/L (ref 23–33)
CREAT SERPL-MCNC: 0.7 MG/DL (ref 0.4–1.2)
EKG ATRIAL RATE: 60 BPM
EKG P AXIS: 51 DEGREES
EKG P-R INTERVAL: 150 MS
EKG Q-T INTERVAL: 440 MS
EKG QRS DURATION: 88 MS
EKG QTC CALCULATION (BAZETT): 440 MS
EKG R AXIS: 35 DEGREES
EKG T AXIS: 39 DEGREES
EKG VENTRICULAR RATE: 60 BPM
EOSINOPHIL # BLD: 0.5 %
EOSINOPHILS ABSOLUTE: 0 THOU/MM3 (ref 0–0.4)
ERYTHROCYTE [DISTWIDTH] IN BLOOD BY AUTOMATED COUNT: 12.7 % (ref 11.5–14.5)
ERYTHROCYTE [DISTWIDTH] IN BLOOD BY AUTOMATED COUNT: 41.5 FL (ref 35–45)
GFR SERPL CREATININE-BSD FRML MDRD: 88 ML/MIN/1.73M2
GLUCOSE BLD-MCNC: 96 MG/DL (ref 70–108)
HCT VFR BLD CALC: 38.7 % (ref 37–47)
HEMOGLOBIN: 12.4 GM/DL (ref 12–16)
IMMATURE GRANS (ABS): 0.03 THOU/MM3 (ref 0–0.07)
IMMATURE GRANULOCYTES: 0.3 %
LYMPHOCYTES # BLD: 31.5 %
LYMPHOCYTES ABSOLUTE: 3.1 THOU/MM3 (ref 1–4.8)
MCH RBC QN AUTO: 28.5 PG (ref 26–33)
MCHC RBC AUTO-ENTMCNC: 32 GM/DL (ref 32.2–35.5)
MCV RBC AUTO: 89 FL (ref 81–99)
MONOCYTES # BLD: 6.3 %
MONOCYTES ABSOLUTE: 0.6 THOU/MM3 (ref 0.4–1.3)
NUCLEATED RED BLOOD CELLS: 0 /100 WBC
PLATELET # BLD: 247 THOU/MM3 (ref 130–400)
PMV BLD AUTO: 11.2 FL (ref 9.4–12.4)
POTASSIUM REFLEX MAGNESIUM: 3.9 MEQ/L (ref 3.5–5.2)
RBC # BLD: 4.35 MILL/MM3 (ref 4.2–5.4)
SEG NEUTROPHILS: 61.1 %
SEGMENTED NEUTROPHILS ABSOLUTE COUNT: 6 THOU/MM3 (ref 1.8–7.7)
SODIUM BLD-SCNC: 139 MEQ/L (ref 135–145)
TOTAL PROTEIN: 6.4 G/DL (ref 6.1–8)
WBC # BLD: 9.9 THOU/MM3 (ref 4.8–10.8)

## 2022-04-27 PROCEDURE — 7100000000 HC PACU RECOVERY - FIRST 15 MIN: Performed by: PODIATRIST

## 2022-04-27 PROCEDURE — 3600000004 HC SURGERY LEVEL 4 BASE: Performed by: PODIATRIST

## 2022-04-27 PROCEDURE — 93005 ELECTROCARDIOGRAM TRACING: CPT

## 2022-04-27 PROCEDURE — 3209999900 FLUORO FOR SURGICAL PROCEDURES

## 2022-04-27 PROCEDURE — 3700000000 HC ANESTHESIA ATTENDED CARE: Performed by: PODIATRIST

## 2022-04-27 PROCEDURE — 73600 X-RAY EXAM OF ANKLE: CPT

## 2022-04-27 PROCEDURE — 2580000003 HC RX 258: Performed by: PHYSICIAN ASSISTANT

## 2022-04-27 PROCEDURE — 6360000002 HC RX W HCPCS: Performed by: PHYSICIAN ASSISTANT

## 2022-04-27 PROCEDURE — 3700000001 HC ADD 15 MINUTES (ANESTHESIA): Performed by: PODIATRIST

## 2022-04-27 PROCEDURE — 1200000000 HC SEMI PRIVATE

## 2022-04-27 PROCEDURE — 2500000003 HC RX 250 WO HCPCS: Performed by: PHYSICIAN ASSISTANT

## 2022-04-27 PROCEDURE — 6360000002 HC RX W HCPCS: Performed by: REGISTERED NURSE

## 2022-04-27 PROCEDURE — 2580000003 HC RX 258: Performed by: REGISTERED NURSE

## 2022-04-27 PROCEDURE — C1713 ANCHOR/SCREW BN/BN,TIS/BN: HCPCS | Performed by: PODIATRIST

## 2022-04-27 PROCEDURE — 6370000000 HC RX 637 (ALT 250 FOR IP)

## 2022-04-27 PROCEDURE — 93010 ELECTROCARDIOGRAM REPORT: CPT | Performed by: INTERNAL MEDICINE

## 2022-04-27 PROCEDURE — 2709999900 HC NON-CHARGEABLE SUPPLY: Performed by: PODIATRIST

## 2022-04-27 PROCEDURE — 36415 COLL VENOUS BLD VENIPUNCTURE: CPT

## 2022-04-27 PROCEDURE — 80053 COMPREHEN METABOLIC PANEL: CPT

## 2022-04-27 PROCEDURE — 0QSJ04Z REPOSITION RIGHT FIBULA WITH INTERNAL FIXATION DEVICE, OPEN APPROACH: ICD-10-PCS | Performed by: PODIATRIST

## 2022-04-27 PROCEDURE — 2500000003 HC RX 250 WO HCPCS: Performed by: REGISTERED NURSE

## 2022-04-27 PROCEDURE — 2500000003 HC RX 250 WO HCPCS: Performed by: PODIATRIST

## 2022-04-27 PROCEDURE — 3600000014 HC SURGERY LEVEL 4 ADDTL 15MIN: Performed by: PODIATRIST

## 2022-04-27 PROCEDURE — 6370000000 HC RX 637 (ALT 250 FOR IP): Performed by: PHYSICIAN ASSISTANT

## 2022-04-27 PROCEDURE — 99254 IP/OBS CNSLTJ NEW/EST MOD 60: CPT

## 2022-04-27 PROCEDURE — 0QSG04Z REPOSITION RIGHT TIBIA WITH INTERNAL FIXATION DEVICE, OPEN APPROACH: ICD-10-PCS | Performed by: PODIATRIST

## 2022-04-27 PROCEDURE — 6820000001 HC L2 TRAUMA SURGERY EVALUATION: Performed by: SURGERY

## 2022-04-27 PROCEDURE — 7100000001 HC PACU RECOVERY - ADDTL 15 MIN: Performed by: PODIATRIST

## 2022-04-27 PROCEDURE — 85025 COMPLETE CBC W/AUTO DIFF WBC: CPT

## 2022-04-27 DEVICE — PERI-LOC VLP 3.5MM LATERAL DISTAL                                    FIBULA LOCKING PLATE 7H RIGHT 107MM
Type: IMPLANTABLE DEVICE | Site: ANKLE | Status: FUNCTIONAL
Brand: PERI-LOC VLP

## 2022-04-27 DEVICE — MINI MONSTER HEADED, FULL THREAD, 3.5 X 50MM
Type: IMPLANTABLE DEVICE | Site: ANKLE | Status: FUNCTIONAL
Brand: MONSTER SCREW SYSTEM

## 2022-04-27 DEVICE — PERI-LOC VLP 3.5MM X 14MM LOCKING                                    SCREW SELF TAPPING
Type: IMPLANTABLE DEVICE | Site: ANKLE | Status: FUNCTIONAL
Brand: PERI-LOC VLP

## 2022-04-27 DEVICE — PERI-LOC VLP 5.0MM X 12MM                                    OSTEOPENIA SCREW FULLY THREADED
Type: IMPLANTABLE DEVICE | Site: ANKLE | Status: FUNCTIONAL
Brand: PERI-LOC VLP

## 2022-04-27 DEVICE — PERI-LOC VLP 3.5MM X 12MM LOCKING                                    SCREW SELF TAPPING
Type: IMPLANTABLE DEVICE | Site: ANKLE | Status: FUNCTIONAL
Brand: PERI-LOC VLP

## 2022-04-27 DEVICE — K-WIRE, SINGLE ENDED TROCAR TIP, SMOOTH, 1.2 X 150MM
Type: IMPLANTABLE DEVICE | Site: ANKLE | Status: FUNCTIONAL
Brand: MONSTER SCREW SYSTEM

## 2022-04-27 RX ORDER — SODIUM CHLORIDE 0.9 % (FLUSH) 0.9 %
5-40 SYRINGE (ML) INJECTION PRN
Status: DISCONTINUED | OUTPATIENT
Start: 2022-04-27 | End: 2022-04-27 | Stop reason: HOSPADM

## 2022-04-27 RX ORDER — CEFAZOLIN SODIUM 1 G/3ML
INJECTION, POWDER, FOR SOLUTION INTRAMUSCULAR; INTRAVENOUS PRN
Status: DISCONTINUED | OUTPATIENT
Start: 2022-04-27 | End: 2022-04-27 | Stop reason: SDUPTHER

## 2022-04-27 RX ORDER — ONDANSETRON 2 MG/ML
4 INJECTION INTRAMUSCULAR; INTRAVENOUS EVERY 6 HOURS PRN
Status: CANCELLED | OUTPATIENT
Start: 2022-04-27

## 2022-04-27 RX ORDER — DEXAMETHASONE SODIUM PHOSPHATE 4 MG/ML
INJECTION, SOLUTION INTRA-ARTICULAR; INTRALESIONAL; INTRAMUSCULAR; INTRAVENOUS; SOFT TISSUE PRN
Status: DISCONTINUED | OUTPATIENT
Start: 2022-04-27 | End: 2022-04-27 | Stop reason: SDUPTHER

## 2022-04-27 RX ORDER — MIDAZOLAM HYDROCHLORIDE 1 MG/ML
INJECTION INTRAMUSCULAR; INTRAVENOUS PRN
Status: DISCONTINUED | OUTPATIENT
Start: 2022-04-27 | End: 2022-04-27 | Stop reason: SDUPTHER

## 2022-04-27 RX ORDER — FENTANYL CITRATE 50 UG/ML
INJECTION, SOLUTION INTRAMUSCULAR; INTRAVENOUS PRN
Status: DISCONTINUED | OUTPATIENT
Start: 2022-04-27 | End: 2022-04-27 | Stop reason: SDUPTHER

## 2022-04-27 RX ORDER — GLYCOPYRROLATE 1 MG/5 ML
SYRINGE (ML) INTRAVENOUS PRN
Status: DISCONTINUED | OUTPATIENT
Start: 2022-04-27 | End: 2022-04-27 | Stop reason: SDUPTHER

## 2022-04-27 RX ORDER — BUPIVACAINE HYDROCHLORIDE 5 MG/ML
INJECTION, SOLUTION EPIDURAL; INTRACAUDAL PRN
Status: DISCONTINUED | OUTPATIENT
Start: 2022-04-27 | End: 2022-04-27 | Stop reason: ALTCHOICE

## 2022-04-27 RX ORDER — FLUOXETINE HYDROCHLORIDE 20 MG/1
20 CAPSULE ORAL DAILY
Status: DISCONTINUED | OUTPATIENT
Start: 2022-04-28 | End: 2022-05-02 | Stop reason: HOSPADM

## 2022-04-27 RX ORDER — OXYCODONE HYDROCHLORIDE 5 MG/1
5 TABLET ORAL EVERY 4 HOURS PRN
Status: CANCELLED | OUTPATIENT
Start: 2022-04-27

## 2022-04-27 RX ORDER — SODIUM CHLORIDE 9 MG/ML
INJECTION, SOLUTION INTRAVENOUS CONTINUOUS PRN
Status: DISCONTINUED | OUTPATIENT
Start: 2022-04-27 | End: 2022-04-27 | Stop reason: SDUPTHER

## 2022-04-27 RX ORDER — ROCURONIUM BROMIDE 10 MG/ML
INJECTION, SOLUTION INTRAVENOUS PRN
Status: DISCONTINUED | OUTPATIENT
Start: 2022-04-27 | End: 2022-04-27 | Stop reason: SDUPTHER

## 2022-04-27 RX ORDER — ASCORBIC ACID 500 MG
500 TABLET ORAL DAILY
Status: DISCONTINUED | OUTPATIENT
Start: 2022-04-28 | End: 2022-05-02 | Stop reason: HOSPADM

## 2022-04-27 RX ORDER — SPIRONOLACTONE 25 MG/1
12.5 TABLET ORAL DAILY
Status: DISCONTINUED | OUTPATIENT
Start: 2022-04-27 | End: 2022-05-02 | Stop reason: HOSPADM

## 2022-04-27 RX ORDER — LIDOCAINE HYDROCHLORIDE AND EPINEPHRINE 10; 10 MG/ML; UG/ML
INJECTION, SOLUTION INFILTRATION; PERINEURAL PRN
Status: DISCONTINUED | OUTPATIENT
Start: 2022-04-27 | End: 2022-04-27 | Stop reason: ALTCHOICE

## 2022-04-27 RX ORDER — SODIUM CHLORIDE 0.9 % (FLUSH) 0.9 %
5-40 SYRINGE (ML) INJECTION EVERY 12 HOURS SCHEDULED
Status: DISCONTINUED | OUTPATIENT
Start: 2022-04-27 | End: 2022-04-27 | Stop reason: HOSPADM

## 2022-04-27 RX ORDER — PROPOFOL 10 MG/ML
INJECTION, EMULSION INTRAVENOUS PRN
Status: DISCONTINUED | OUTPATIENT
Start: 2022-04-27 | End: 2022-04-27 | Stop reason: SDUPTHER

## 2022-04-27 RX ORDER — OXYCODONE HYDROCHLORIDE 5 MG/1
10 TABLET ORAL EVERY 4 HOURS PRN
Status: CANCELLED | OUTPATIENT
Start: 2022-04-27

## 2022-04-27 RX ORDER — MORPHINE SULFATE 2 MG/ML
2 INJECTION, SOLUTION INTRAMUSCULAR; INTRAVENOUS EVERY 5 MIN PRN
Status: DISCONTINUED | OUTPATIENT
Start: 2022-04-27 | End: 2022-04-27 | Stop reason: HOSPADM

## 2022-04-27 RX ORDER — HYDROMORPHONE HCL 110MG/55ML
PATIENT CONTROLLED ANALGESIA SYRINGE INTRAVENOUS PRN
Status: DISCONTINUED | OUTPATIENT
Start: 2022-04-27 | End: 2022-04-27 | Stop reason: SDUPTHER

## 2022-04-27 RX ORDER — SODIUM CHLORIDE 0.9 % (FLUSH) 0.9 %
5-40 SYRINGE (ML) INJECTION PRN
Status: CANCELLED | OUTPATIENT
Start: 2022-04-27

## 2022-04-27 RX ORDER — SUCCINYLCHOLINE/SOD CL,ISO/PF 200MG/10ML
SYRINGE (ML) INTRAVENOUS PRN
Status: DISCONTINUED | OUTPATIENT
Start: 2022-04-27 | End: 2022-04-27 | Stop reason: SDUPTHER

## 2022-04-27 RX ORDER — ONDANSETRON 4 MG/1
4 TABLET, ORALLY DISINTEGRATING ORAL EVERY 8 HOURS PRN
Status: CANCELLED | OUTPATIENT
Start: 2022-04-27

## 2022-04-27 RX ORDER — LIDOCAINE HCL/PF 100 MG/5ML
SYRINGE (ML) INJECTION PRN
Status: DISCONTINUED | OUTPATIENT
Start: 2022-04-27 | End: 2022-04-27 | Stop reason: SDUPTHER

## 2022-04-27 RX ORDER — QUETIAPINE FUMARATE 25 MG/1
50 TABLET, FILM COATED ORAL NIGHTLY
Status: DISCONTINUED | OUTPATIENT
Start: 2022-04-27 | End: 2022-05-02 | Stop reason: HOSPADM

## 2022-04-27 RX ORDER — SODIUM CHLORIDE 0.9 % (FLUSH) 0.9 %
5-40 SYRINGE (ML) INJECTION EVERY 12 HOURS SCHEDULED
Status: CANCELLED | OUTPATIENT
Start: 2022-04-27

## 2022-04-27 RX ORDER — METOPROLOL SUCCINATE 50 MG/1
50 TABLET, EXTENDED RELEASE ORAL DAILY
Status: DISCONTINUED | OUTPATIENT
Start: 2022-04-27 | End: 2022-05-02 | Stop reason: HOSPADM

## 2022-04-27 RX ORDER — FENTANYL CITRATE 50 UG/ML
50 INJECTION, SOLUTION INTRAMUSCULAR; INTRAVENOUS EVERY 5 MIN PRN
Status: DISCONTINUED | OUTPATIENT
Start: 2022-04-27 | End: 2022-04-27 | Stop reason: HOSPADM

## 2022-04-27 RX ORDER — ONDANSETRON 2 MG/ML
INJECTION INTRAMUSCULAR; INTRAVENOUS PRN
Status: DISCONTINUED | OUTPATIENT
Start: 2022-04-27 | End: 2022-04-27 | Stop reason: SDUPTHER

## 2022-04-27 RX ORDER — SODIUM CHLORIDE 9 MG/ML
INJECTION, SOLUTION INTRAVENOUS PRN
Status: CANCELLED | OUTPATIENT
Start: 2022-04-27

## 2022-04-27 RX ORDER — SODIUM CHLORIDE 9 MG/ML
INJECTION, SOLUTION INTRAVENOUS CONTINUOUS
Status: CANCELLED | OUTPATIENT
Start: 2022-04-27

## 2022-04-27 RX ORDER — LANOLIN ALCOHOL/MO/W.PET/CERES
100 CREAM (GRAM) TOPICAL DAILY
Status: DISCONTINUED | OUTPATIENT
Start: 2022-04-28 | End: 2022-05-02 | Stop reason: HOSPADM

## 2022-04-27 RX ORDER — LABETALOL 20 MG/4 ML (5 MG/ML) INTRAVENOUS SYRINGE
10
Status: DISCONTINUED | OUTPATIENT
Start: 2022-04-27 | End: 2022-04-27 | Stop reason: HOSPADM

## 2022-04-27 RX ORDER — NEOSTIGMINE METHYLSULFATE 5 MG/5 ML
SYRINGE (ML) INTRAVENOUS PRN
Status: DISCONTINUED | OUTPATIENT
Start: 2022-04-27 | End: 2022-04-27 | Stop reason: SDUPTHER

## 2022-04-27 RX ORDER — SODIUM CHLORIDE 9 MG/ML
INJECTION, SOLUTION INTRAVENOUS PRN
Status: DISCONTINUED | OUTPATIENT
Start: 2022-04-27 | End: 2022-04-27 | Stop reason: HOSPADM

## 2022-04-27 RX ADMIN — CEFAZOLIN 2000 MG: 1 INJECTION, POWDER, FOR SOLUTION INTRAMUSCULAR; INTRAVENOUS at 16:41

## 2022-04-27 RX ADMIN — PROPOFOL 200 MG: 10 INJECTION, EMULSION INTRAVENOUS at 16:30

## 2022-04-27 RX ADMIN — HYDROMORPHONE HYDROCHLORIDE 0.4 MG: 2 INJECTION INTRAMUSCULAR; INTRAVENOUS; SUBCUTANEOUS at 17:52

## 2022-04-27 RX ADMIN — SODIUM CHLORIDE: 9 INJECTION, SOLUTION INTRAVENOUS at 18:30

## 2022-04-27 RX ADMIN — SODIUM CHLORIDE: 9 INJECTION, SOLUTION INTRAVENOUS at 16:26

## 2022-04-27 RX ADMIN — PHENYLEPHRINE HYDROCHLORIDE 100 MCG: 10 INJECTION INTRAVENOUS at 16:42

## 2022-04-27 RX ADMIN — FENTANYL CITRATE 25 MCG: 50 INJECTION INTRAMUSCULAR; INTRAVENOUS at 06:22

## 2022-04-27 RX ADMIN — Medication 0.4 MG: at 16:42

## 2022-04-27 RX ADMIN — FENTANYL CITRATE 25 MCG: 50 INJECTION INTRAMUSCULAR; INTRAVENOUS at 14:11

## 2022-04-27 RX ADMIN — ROCURONIUM BROMIDE 20 MG: 50 INJECTION, SOLUTION INTRAVENOUS at 16:35

## 2022-04-27 RX ADMIN — FENTANYL CITRATE 50 MCG: 50 INJECTION, SOLUTION INTRAMUSCULAR; INTRAVENOUS at 17:25

## 2022-04-27 RX ADMIN — FENTANYL CITRATE 50 MCG: 50 INJECTION, SOLUTION INTRAMUSCULAR; INTRAVENOUS at 16:49

## 2022-04-27 RX ADMIN — HYDROMORPHONE HYDROCHLORIDE 0.2 MG: 2 INJECTION INTRAMUSCULAR; INTRAVENOUS; SUBCUTANEOUS at 18:46

## 2022-04-27 RX ADMIN — MIDAZOLAM 2 MG: 1 INJECTION INTRAMUSCULAR; INTRAVENOUS at 16:30

## 2022-04-27 RX ADMIN — Medication 100 MG: at 16:30

## 2022-04-27 RX ADMIN — FENTANYL CITRATE 25 MCG: 50 INJECTION INTRAMUSCULAR; INTRAVENOUS at 01:07

## 2022-04-27 RX ADMIN — SODIUM CHLORIDE: 9 INJECTION, SOLUTION INTRAVENOUS at 06:26

## 2022-04-27 RX ADMIN — FENTANYL CITRATE 25 MCG: 50 INJECTION, SOLUTION INTRAMUSCULAR; INTRAVENOUS at 16:58

## 2022-04-27 RX ADMIN — SENNOSIDES 8.6 MG: 8.6 TABLET, COATED ORAL at 09:01

## 2022-04-27 RX ADMIN — Medication 4 MG: at 18:11

## 2022-04-27 RX ADMIN — Medication 150 MG: at 16:30

## 2022-04-27 RX ADMIN — FENTANYL CITRATE 50 MCG: 50 INJECTION, SOLUTION INTRAMUSCULAR; INTRAVENOUS at 16:30

## 2022-04-27 RX ADMIN — FENTANYL CITRATE 25 MCG: 50 INJECTION, SOLUTION INTRAMUSCULAR; INTRAVENOUS at 17:04

## 2022-04-27 RX ADMIN — HYDROMORPHONE HYDROCHLORIDE 0.4 MG: 2 INJECTION INTRAMUSCULAR; INTRAVENOUS; SUBCUTANEOUS at 18:18

## 2022-04-27 RX ADMIN — FAMOTIDINE 20 MG: 10 INJECTION, SOLUTION INTRAVENOUS at 20:51

## 2022-04-27 RX ADMIN — ONDANSETRON 4 MG: 2 INJECTION INTRAMUSCULAR; INTRAVENOUS at 18:11

## 2022-04-27 RX ADMIN — FAMOTIDINE 20 MG: 10 INJECTION, SOLUTION INTRAVENOUS at 09:01

## 2022-04-27 RX ADMIN — DEXAMETHASONE SODIUM PHOSPHATE 10 MG: 4 INJECTION, SOLUTION INTRAMUSCULAR; INTRAVENOUS at 16:30

## 2022-04-27 RX ADMIN — QUETIAPINE FUMARATE 50 MG: 25 TABLET ORAL at 20:51

## 2022-04-27 RX ADMIN — POLYETHYLENE GLYCOL 3350 17 G: 17 POWDER, FOR SOLUTION ORAL at 09:01

## 2022-04-27 RX ADMIN — HYDROMORPHONE HYDROCHLORIDE 0.2 MG: 2 INJECTION INTRAMUSCULAR; INTRAVENOUS; SUBCUTANEOUS at 18:42

## 2022-04-27 RX ADMIN — Medication 0.8 MG: at 18:11

## 2022-04-27 RX ADMIN — ACETAMINOPHEN 650 MG: 325 TABLET ORAL at 06:22

## 2022-04-27 ASSESSMENT — PULMONARY FUNCTION TESTS
PIF_VALUE: 25
PIF_VALUE: 16
PIF_VALUE: 16
PIF_VALUE: 14
PIF_VALUE: 15
PIF_VALUE: 2
PIF_VALUE: 0
PIF_VALUE: 0
PIF_VALUE: 14
PIF_VALUE: 13
PIF_VALUE: 2
PIF_VALUE: 16
PIF_VALUE: 14
PIF_VALUE: 14
PIF_VALUE: 15
PIF_VALUE: 16
PIF_VALUE: 16
PIF_VALUE: 2
PIF_VALUE: 2
PIF_VALUE: 3
PIF_VALUE: 14
PIF_VALUE: 16
PIF_VALUE: 14
PIF_VALUE: 16
PIF_VALUE: 1
PIF_VALUE: 2
PIF_VALUE: 15
PIF_VALUE: 2
PIF_VALUE: 2
PIF_VALUE: 14
PIF_VALUE: 15
PIF_VALUE: 14
PIF_VALUE: 16
PIF_VALUE: 14
PIF_VALUE: 16
PIF_VALUE: 0
PIF_VALUE: 15
PIF_VALUE: 16
PIF_VALUE: 15
PIF_VALUE: 14
PIF_VALUE: 14
PIF_VALUE: 0
PIF_VALUE: 16
PIF_VALUE: 16
PIF_VALUE: 2
PIF_VALUE: 16
PIF_VALUE: 15
PIF_VALUE: 16
PIF_VALUE: 2
PIF_VALUE: 14
PIF_VALUE: 2
PIF_VALUE: 14
PIF_VALUE: 16
PIF_VALUE: 13
PIF_VALUE: 16
PIF_VALUE: 2
PIF_VALUE: 14
PIF_VALUE: 16
PIF_VALUE: 0
PIF_VALUE: 14
PIF_VALUE: 16
PIF_VALUE: 2
PIF_VALUE: 16
PIF_VALUE: 25
PIF_VALUE: 16
PIF_VALUE: 2
PIF_VALUE: 2
PIF_VALUE: 15
PIF_VALUE: 14
PIF_VALUE: 2
PIF_VALUE: 12
PIF_VALUE: 15
PIF_VALUE: 14
PIF_VALUE: 2
PIF_VALUE: 14
PIF_VALUE: 15
PIF_VALUE: 16
PIF_VALUE: 14
PIF_VALUE: 2
PIF_VALUE: 13
PIF_VALUE: 15
PIF_VALUE: 14
PIF_VALUE: 14
PIF_VALUE: 16
PIF_VALUE: 12
PIF_VALUE: 14
PIF_VALUE: 1
PIF_VALUE: 2
PIF_VALUE: 15
PIF_VALUE: 15
PIF_VALUE: 2
PIF_VALUE: 16
PIF_VALUE: 12
PIF_VALUE: 16
PIF_VALUE: 2
PIF_VALUE: 16
PIF_VALUE: 14
PIF_VALUE: 3
PIF_VALUE: 2
PIF_VALUE: 15
PIF_VALUE: 5
PIF_VALUE: 14
PIF_VALUE: 2
PIF_VALUE: 14
PIF_VALUE: 16
PIF_VALUE: 14
PIF_VALUE: 14
PIF_VALUE: 13
PIF_VALUE: 16
PIF_VALUE: 16
PIF_VALUE: 2
PIF_VALUE: 16
PIF_VALUE: 16
PIF_VALUE: 14
PIF_VALUE: 0
PIF_VALUE: 15
PIF_VALUE: 2
PIF_VALUE: 16
PIF_VALUE: 16
PIF_VALUE: 14
PIF_VALUE: 16
PIF_VALUE: 13
PIF_VALUE: 2
PIF_VALUE: 14
PIF_VALUE: 2
PIF_VALUE: 17
PIF_VALUE: 2
PIF_VALUE: 14
PIF_VALUE: 16
PIF_VALUE: 2
PIF_VALUE: 14
PIF_VALUE: 14
PIF_VALUE: 1
PIF_VALUE: 14
PIF_VALUE: 16
PIF_VALUE: 16

## 2022-04-27 ASSESSMENT — PAIN SCALES - GENERAL
PAINLEVEL_OUTOF10: 6
PAINLEVEL_OUTOF10: 0
PAINLEVEL_OUTOF10: 6
PAINLEVEL_OUTOF10: 4
PAINLEVEL_OUTOF10: 3
PAINLEVEL_OUTOF10: 6
PAINLEVEL_OUTOF10: 0
PAINLEVEL_OUTOF10: 0
PAINLEVEL_OUTOF10: 6
PAINLEVEL_OUTOF10: 0

## 2022-04-27 ASSESSMENT — PAIN DESCRIPTION - LOCATION
LOCATION: ANKLE
LOCATION: ANKLE

## 2022-04-27 ASSESSMENT — PAIN DESCRIPTION - PAIN TYPE: TYPE: ACUTE PAIN

## 2022-04-27 ASSESSMENT — PAIN DESCRIPTION - FREQUENCY: FREQUENCY: CONTINUOUS

## 2022-04-27 ASSESSMENT — PAIN DESCRIPTION - ORIENTATION
ORIENTATION: RIGHT
ORIENTATION: RIGHT

## 2022-04-27 ASSESSMENT — PAIN DESCRIPTION - DESCRIPTORS
DESCRIPTORS: ACHING
DESCRIPTORS: ACHING

## 2022-04-27 ASSESSMENT — LIFESTYLE VARIABLES: HOW OFTEN DO YOU HAVE A DRINK CONTAINING ALCOHOL: NEVER

## 2022-04-27 ASSESSMENT — PAIN DESCRIPTION - ONSET: ONSET: ON-GOING

## 2022-04-27 ASSESSMENT — PAIN - FUNCTIONAL ASSESSMENT: PAIN_FUNCTIONAL_ASSESSMENT: PREVENTS OR INTERFERES WITH MANY ACTIVE NOT PASSIVE ACTIVITIES

## 2022-04-27 NOTE — ANESTHESIA PRE PROCEDURE
Department of Anesthesiology  Preprocedure Note       Name:  Ness Chamberlain   Age:  45 y.o.  :  1970                                          MRN:  [id number]         Date:  2022      Surgeon: Chano De La Garza):  Murphy Shaikh DPM    Procedure: Procedure(s):  ORIF Trimalleolar Ankle Fracture Right    Medications prior to admission:   Prior to Admission medications    Medication Sig Start Date End Date Taking?  Authorizing Provider   Nutritional Supplements (VITAMIN D BOOSTER PO) Take 1,000 mcg by mouth daily    Historical Provider, MD   diphenhydrAMINE (BENADRYL) 50 MG capsule Take 50 mg by mouth every 6 hours as needed for Itching    Historical Provider, MD   carbamide peroxide (DEBROX) 6.5 % otic solution Place 5 drops into both ears as needed    Historical Provider, MD   bisacodyl (DULCOLAX) 10 MG suppository Place 10 mg rectally as needed for Constipation    Historical Provider, MD   docusate sodium (COLACE) 100 MG capsule Take 100 mg by mouth 3 times daily as needed for Constipation    Historical Provider, MD   EPINEPHrine HCl, Anaphylaxis, (EPIPEN IM) Inject into the muscle as needed    Historical Provider, MD   hydrocortisone 1 % cream Apply topically 2 times daily Apply topically 2 times daily to face    Historical Provider, MD   aluminum & magnesium hydroxide-simethicone (MAALOX) 200-200-20 MG/5ML SUSP suspension Take by mouth as needed for Indigestion    Historical Provider, MD   magnesium hydroxide (MILK OF MAGNESIA) 400 MG/5ML suspension Take 30 mLs by mouth as needed for Constipation    Historical Provider, MD   polyethylene glycol (MIRALAX) 17 g PACK packet Take 17 g by mouth as needed    Historical Provider, MD   Sennosides (SENOKOT PO) Take 2 tablets by mouth daily as needed    Historical Provider, MD   acetaminophen (TYLENOL) 325 MG tablet Take 650 mg by mouth every 6 hours as needed for Pain    Historical Provider, MD   FLUoxetine (PROZAC) 20 MG capsule Take 20 mg by mouth daily Historical Provider, MD   metoprolol succinate (TOPROL XL) 50 MG extended release tablet Take 50 mg by mouth daily    Historical Provider, MD   QUEtiapine (SEROQUEL) 50 MG tablet Take 50 mg by mouth nightly     Historical Provider, MD   spironolactone (ALDACTONE) 25 MG tablet Take 12.5 mg by mouth daily    Historical Provider, MD   thiamine mononitrate 100 MG tablet Take 100 mg by mouth daily    Historical Provider, MD   LORazepam (ATIVAN) 1 MG tablet Take 1 mg by mouth every 12 hours as needed for Anxiety.     Historical Provider, MD   EPINEPHrine (EPIPEN 2-LUZ) 0.3 MG/0.3ML SOAJ injection Inject 0.3 mLs into the muscle once for 1 dose Use as directed for allergic reaction 1/5/21 1/5/21  Ata Garza MD   Ascorbic Acid (VITAMIN C) 500 MG CAPS Take 1 tablet by mouth daily    Historical Provider, MD       Current medications:    Current Facility-Administered Medications   Medication Dose Route Frequency Provider Last Rate Last Admin    Vitamin C CAPS 1 tablet  1 tablet Oral Daily Gertrude Miranda PA-C        FLUoxetine (PROZAC) capsule 20 mg  20 mg Oral Daily Gertrude Miranda PA-C        [Held by provider] metoprolol succinate (TOPROL XL) extended release tablet 50 mg  50 mg Oral Daily Gertrude Miranda PA-C        QUEtiapine (SEROQUEL) tablet 50 mg  50 mg Oral Nightly Gertrude Miranda PA-C        [Held by provider] spironolactone (ALDACTONE) tablet 12.5 mg  12.5 mg Oral Daily Gertrude Miranda PA-C        thiamine mononitrate tablet 100 mg  100 mg Oral Daily Gertrude Miranda PA-C        0.9 % sodium chloride infusion   IntraVENous Continuous Kem Chandra  mL/hr at 04/27/22 0626 New Bag at 04/27/22 0626    sodium chloride flush 0.9 % injection 5-40 mL  5-40 mL IntraVENous 2 times per day BHASKAR Ho   10 mL at 04/26/22 2234    sodium chloride flush 0.9 % injection 5-40 mL  5-40 mL IntraVENous PRN BHASKAR Conway        0.9 % sodium chloride infusion  25 mL IntraVENous PRN Buel Bouche Pappa PA        acetaminophen (TYLENOL) tablet 650 mg  650 mg Oral Q4H PRN BHASKAR Conway   650 mg at 04/27/22 0622    famotidine (PEPCID) injection 20 mg  20 mg IntraVENous BID BHASKAR Conway   20 mg at 04/27/22 0901    ondansetron (ZOFRAN-ODT) disintegrating tablet 4 mg  4 mg Oral Q8H PRN BHASKAR Conway        Or    ondansetron (ZOFRAN) injection 4 mg  4 mg IntraVENous Q6H PRN BHASKAR Conway        polyethylene glycol (GLYCOLAX) packet 17 g  17 g Oral Daily BHASKAR Conway   17 g at 04/27/22 0901    bisacodyl (DULCOLAX) suppository 10 mg  10 mg Rectal Daily BHASKAR Conway        senna (SENOKOT) tablet 8.6 mg  1 tablet Oral Daily PRN BHASKAR Conway   8.6 mg at 04/27/22 0901    fentaNYL (SUBLIMAZE) injection 25 mcg  25 mcg IntraVENous Q1H PRN BHASKAR Conway   25 mcg at 04/27/22 1411    Or    fentaNYL (SUBLIMAZE) injection 50 mcg  50 mcg IntraVENous Q1H PRN BHASKAR Conway           Allergies: Allergies   Allergen Reactions    Bactrim [Sulfamethoxazole-Trimethoprim]     Codeine Anxiety    Flagyl [Metronidazole] Nausea Only       Problem List:    Patient Active Problem List   Diagnosis Code    Sigmoid diverticulitis K57.32    Hypertension I10    Hyperlipemia E78.5    GERD (gastroesophageal reflux disease) K21.9    S/P gastric bypass Z98.84    Anxiety F41.9    Alcohol withdrawal ([address] F10.239    Alcoholism ([address] F10.20    Amnesia R41.3    Cardiomyopathy ([address] I42.9    Depression F32. 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 Elisabeth Saiin       Past Medical History:        Diagnosis Date    Anxiety     GERD (gastroesophageal reflux disease)     History of pneumonia 07/2018    Legionella requiring intubation    Hyperlipemia  Hypertension     Moderate episode of recurrent major depressive disorder ([facility] 1/12/2021    Obesity     Paroxysmal atrial fibrillation ([facility] 07/2018    during pneumonia hospitalization    Wernicke-Korsakoff syndrome ([facility] 1/12/2021       Past Surgical History:        Procedure Laterality Date    APPENDECTOMY      age 15    BARIATRIC SURGERY  05/25/2015    COLONOSCOPY  8/4/2010    DILATION AND CURETTAGE      KNEE ARTHROSCOPY Right     OVARY REMOVAL Right 2009    cyst    SINUS SURGERY      TUBAL LIGATION Bilateral 2009    WISDOM TOOTH EXTRACTION         Social History:    Social History     Tobacco Use    Smoking status: Never Smoker    Smokeless tobacco: Never Used   Substance Use Topics    Alcohol use: Not Currently     Alcohol/week: 3.0 standard drinks     Types: 3 Glasses of wine per week     Comment: occasional                                Counseling given: Not Answered      Vital Signs (Current):   Vitals:    04/26/22 2200 04/27/22 0045 04/27/22 0530 04/27/22 0858   BP: (!) 95/52 100/63 109/73 117/77   Pulse: 63 59 65 62   Resp: 14 16 14 16   Temp: 98.6 °F (37 °C) 98.1 °F (36.7 °C) 97.5 °F (36.4 °C) 98.6 °F (37 °C)   TempSrc: Oral Oral Oral Oral   SpO2: 96% 96% 96% 97%   Weight:       Height:                                                  BP Readings from Last 3 Encounters:   04/27/22 117/77   01/05/21 106/81   11/06/20 110/82       NPO Status:                                                                                 BMI:   Wt Readings from Last 3 Encounters:   04/26/22 192 lb 12.8 oz (87.5 kg)   11/06/20 202 lb 9.6 oz (91.9 kg)   10/07/19 172 lb (78 kg)     Body mass index is 29.32 kg/m².     CBC:   Lab Results   Component Value Date    WBC 9.9 04/27/2022    RBC 4.35 04/27/2022    HGB 12.4 04/27/2022    HCT 38.7 04/27/2022    MCV 89.0 04/27/2022    RDW 14.6 07/17/2018     04/27/2022       CMP:   Lab Results   Component Value Date     04/27/2022    K 3.9 04/27/2022    CL 106 04/27/2022    CO2 22 04/27/2022    BUN 10 04/27/2022    CREATININE 0.7 04/27/2022    GFRAA >60 09/28/2019    LABGLOM 88 04/27/2022    GLUCOSE 96 04/27/2022    PROT 6.4 04/27/2022    CALCIUM 8.7 04/27/2022    BILITOT 0.5 04/27/2022    ALKPHOS 74 04/27/2022    AST 15 04/27/2022    ALT 9 04/27/2022       POC Tests: No results for input(s): POCGLU, POCNA, POCK, POCCL, POCBUN, POCHEMO, POCHCT in the last 72 hours. Coags: No results found for: PROTIME, INR, APTT    HCG (If Applicable): No results found for: PREGTESTUR, PREGSERUM, HCG, HCGQUANT     ABGs: No results found for: PHART, PO2ART, LQE6AFV, OAI0EBM, BEART, G6USRWSU     Type & Screen (If Applicable):  No results found for: LABABO, LABRH    Drug/Infectious Status (If Applicable):  No results found for: HIV, HEPCAB    COVID-19 Screening (If Applicable): No results found for: COVID19        Anesthesia Evaluation    Airway: Mallampati: II  TM distance: >3 FB   Neck ROM: full  Mouth opening: > = 3 FB Dental:          Pulmonary: breath sounds clear to auscultation                             Cardiovascular:    (+) hypertension:,         Rhythm: regular                      Neuro/Psych:   (+) psychiatric history:            GI/Hepatic/Renal:   (+) GERD:,           Endo/Other:                     Abdominal:   (+) obese,           Vascular: Other Findings:             Anesthesia Plan      general     ASA 2       Induction: intravenous. MIPS: Postoperative opioids intended and Prophylactic antiemetics administered. Anesthetic plan and risks discussed with patient. Plan discussed with CRNA.                   Orville Blanc MD   4/27/2022

## 2022-04-27 NOTE — BRIEF OP NOTE
Brief Postoperative Note      Patient: Neena Stacy  YOB: 1970  MRN: [id number]    Date of Procedure: 4/27/2022    Pre-Op Diagnosis: Trimalleolar fx    Post-Op Diagnosis: Same       Procedure(s):  ORIF Trimalleolar Ankle Fracture Right    Surgeon(s):  Geetha Hannah DPM    Assistant:  Resident: Jorge Borden DPM; Wili Mayes DPM  Student: Yusef Taylor MS3    Anesthesia: General    Estimated Blood Loss (mL): Minimal    Complications: None    Specimens:   * No specimens in log *    Implants:  Implant Name Type Inv. Item Serial No.  Lot No. LRB No. Used Action   PLATE BNE [id number] 7 H ST R DST LAT FIBULAR CRISTOBAL ANG TRISHA FOR - [id number]  PLATE BNE [id number] 7 H ST R DST LAT FIBULAR CRISTOBAL ANG TRISHA FOR  [employer]-  Right 1 Implanted   SCREW BNE L12MM DIA3. 5MM S STL ST CRISTOBAL ANG NONCANNULATED TRISHA - [id number]  SCREW BNE L12MM DIA3. 5MM S STL ST CRISTOBAL ANG NONCANNULATED TRISHA  [employer]-  Right 4 Implanted   SCREW BNE L14MM DIA3. 5MM S STL ST CRISTOBAL ANG NONCANNULATED TRISHA - [id number]  SCREW BNE L14MM DIA3. 5MM S STL ST CRISTOBAL ANG NONCANNULATED TRISHA  [employer]-  Right 4 Implanted   IMPLANT OP RM MINI-MONSTER 3.5 X 50MM H - [id number]  IMPLANT OP RM MINI-MONSTER 3.5 X 50MM H  PARAGON 28-WD  Right 2 Implanted   GUIDEWIRE SURG L150MM DIA1. 2MM NICKEL CHROM SMOOTH SGL END - [id number]  GUIDEWIRE SURG L150MM DIA1. 2MM NICKEL CHROM SMOOTH SGL END  PARAGON 28-WD  Right 2 Implanted   SCREW BNE L12MM DIA5MM S STL FULL THRD OSTEOPENIA FOR CRISTOBAL - [id number]  SCREW BNE L12MM DIA5MM S STL FULL THRD OSTEOPENIA FOR CRISTOBAL  [employer]-  Right 1 Implanted         Drains: * No LDAs found *    Findings: Consistent with diagnosis    Electronically signed by Jorge Borden DPM on 4/27/2022 at 6:53 PM

## 2022-04-27 NOTE — H&P
[address] [address]  History and Physical Update    Pt Name: Annmarie Butler  MRN: [id number]  YOB: 1970  Date of evaluation: 4/27/2022    [x] I have examined the patient and reviewed the H&P/Consult and there are no changes to the patient or plans.     [] I have examined the patient and reviewed the H&P/Consult and have noted the following changes:        Karina Alexis DPM DPM  Electronically signed 4/27/2022 at 7:21 AM

## 2022-04-27 NOTE — PROGRESS NOTES
1855- Pt arrived to PACU drowsy with nasal pharyngeal airway in place. VSS. 1915- Handoff called to PROSPER Leal at this time. 1929- Pt transferred to [facility] at this time in stable condition.

## 2022-04-27 NOTE — OP NOTE
Operative Note      Patient: Dagoberto Rockwell  YOB: 1970  MRN: [id number]    Date of Procedure: 4/27/2022    Pre-Op Diagnosis: Trimalleolar fx    Post-Op Diagnosis: Same       Procedure(s):  ORIF Trimalleolar Ankle Fracture Right    Surgeon(s):  Lore Medina DPM    Assistant:  Resident: Fallon Hercules DPM; Elva Hernandez DPM  Student: Cassy Nettles MS3    Anesthesia: General    Estimated Blood Loss (mL): Minimal    Complications: None    Specimens:   * No specimens in log *    Implants:  Implant Name Type Inv. Item Serial No.  Lot No. LRB No. Used Action   PLATE BNE [id number] 7 H ST R DST LAT FIBULAR CRISTOBAL ANG TRISHA FOR - [id number]  PLATE BNE [id number] 7 H ST R DST LAT FIBULAR CRISTOBAL ANG TRISHA FOR  [employer]-  Right 1 Implanted   SCREW BNE L12MM DIA3. 5MM S STL ST CRISTOBAL ANG NONCANNULATED TRISHA - [id number]  SCREW BNE L12MM DIA3. 5MM S STL ST CRISTOBAL ANG NONCANNULATED TRISHA  [employer]-  Right 4 Implanted   SCREW BNE L14MM DIA3. 5MM S STL ST CRISTOBAL ANG NONCANNULATED TRISHA - [id number]  SCREW BNE L14MM DIA3. 5MM S STL ST CRISTOBAL ANG NONCANNULATED TRISHA  [employer]-  Right 4 Implanted   IMPLANT OP RM MINI-MONSTER 3.5 X 50MM H - [id number]  IMPLANT OP RM MINI-MONSTER 3.5 X 50MM H  PARAGON 28-WD  Right 2 Implanted   GUIDEWIRE SURG L150MM DIA1. 2MM NICKEL CHROM SMOOTH SGL END - [id number]  GUIDEWIRE SURG L150MM DIA1. 2MM NICKEL CHROM SMOOTH SGL END  PARAGON 28-WD  Right 2 Implanted   SCREW BNE L12MM DIA5MM S STL FULL THRD OSTEOPENIA FOR CRISTOBAL - [id number]  SCREW BNE L12MM DIA5MM S STL FULL THRD OSTEOPENIA FOR CRISTOBAL  [employer]-  Right 1 Implanted         Drains: * No LDAs found *    Findings: Consistent with diagnosis    Detailed Description of Procedure: Indications: Patient is a 46y. o. female with a chief complaint of right ankle fracture who is being seen by Dr. Seda Wahl and being treated for right ankle fracture.   At this time all conservative options have been exhausted and surgical intervention is necessary. The procedure has been explained to the patient and they understand the risks, benefits and possible complications including incision dehiscence, surgical site infection, blood clot formation, nerve pain, need for additional surgery, loss of limb, or loss of life. No promises have been made as to surgical outcome. Procedure: The patient was transported from the pre-op holding to the operating room and placed in a supine position. A pneumatic thigh tourniquet was applied to the right thigh. The right lower leg was then prepped and draped in the normal aspetic manner. The right lower leg was then exsanguinated with an esmarch and the tourniquet was inflated to 250 mmHg. Attention was directed to the lateral aspect of the right ankle. A skin marker was used to rylie an initial incision site over the area and a standard lateral ankle incision approach. A 15 scalpel blade was then used to make a full thickness incision. Blunt dissection was then carried down to the periosteal layer of the lateral distal fibula. The periosteum was incised, and a key periosteal elevator was then used to remove the periosteum from the distal lateral fibula. It is important to note that all small bleeding vessels were cauterized at this point in time. The fibular fracture was then identified. A point-to-point clamp was then used to reduce the fibular fragments together; proper anatomic alignment was confirmed using fluoroscopy. Next, a [employer] 7-hole VLP plate was placed on the lateral aspect of the distal fibula. 2 retention pins were drilled into the plate. Next, the distal cluster of the plate was drilled and filled with 3.5 mm locking unicortical screws. Next, the drill holes proximal to the fracture were then drilled and filled with 4 3.5 mm locking bicortical screws. Attention was then directed to the medial aspect of the right ankle.   Skin marker was used to rylie an initial incision site over the medial malleolus and a central incision approximately 5 cm in length was then made with a #15 scalpel blade over the central aspect of the distal tibia and medial malleolus. Blunt dissection was carried down to the level of the periosteum. The periosteum was incised with a #15 scalpel blade, and a key periosteal elevator was then used to remove the periosteum from the medial malleolus. Next, 2 K wires were inserted from the distal tip of the medial malleolus proximally to the lateral cortex of the distal tibia; proper placement of the K wires was confirmed using fluoroscopy, and the K wires were confirmed to not have violated the medial ankle joint. Next, two 4.0 x 50mm mm fully threaded cannulated screws were inserted over the K wires, and adequate compression and fixation of the medial malleolus was confirmed using fluoroscopy. Next, attention was directed to the lateral aspect of the fibula. A drill was used to make 2 holes from the lateral aspect of the fibula to the medial aspect of the tibia. Next, 2 Smith [employer] Nephew Invisiknot suture anchors were placed from lateral to medial fashion; a bone compression clamp was then applied from the lateral aspect of the fibula to the medial aspect of the tibia, and the suture anchors were then tightened manually and tied with several knots. Syndesmotic integrity was then confirmed using fluoroscopy. The incisions were flushed with copious amounts of Irrisept solution and sterile saline. The subcutaneous tissues were re-appoximated with 3-0 Vicryl. The skin was closed using 4-0 Prolene. A post-op injection of 40 cc's of one-to-one mix of 1% lidocaine with epinephrine with 0.5% Marcaine plain was injected. The incision was dressed with adaptic, 4x4's, Kerlix rolls, stockinette, and an Ace bandage.   The pneumatic thigh tourniquet was then deflated and an immediate hyperemic response was noted to all digits of the right foot. A well-padded below-knee cast was then applied. The patient was transported to the PACU with VSS and NVS intact to all digits of the right foot. No complications were noted throughout the procedure. The patient is to be discharged per PACU protocol. The patient is to follow-up with Dr. Naomi Chow in the office. Dr. Marcellus Oakes.  Haycock, DPM Lanell Goldmann, DPM PGY-2  Foot and Ankle Surgical Resident      Electronically signed by Lanell Goldmann, DPM on 4/27/2022 at 6:56 PM

## 2022-04-27 NOTE — CONSULTS
Trauma consult to the trauma service to admit for podiatry with isolated ankle fracture  I saw patient approximately 520 today were reducing the patient's ankle in the emergency department at this time  Patient was walking with her friend at the [employer] and somehow she fell and suffered a right ankle injury. She was transported to the emergency room at [facility] for evaluation there. Films revealed a trimalleolar ankle fracture. Podiatry was contacted  Podiatry wanted the patient placed in the splint and would add her on to have surgery the next day but the patient has not Warnicke's encephalopathy did not think she could go home and get back to the hospital tomorrow and podiatry wanted to take her to surgery to fix her ankle so the trauma service is admitting the patient today    GCS 15 awake and alert  No evidence of head or neck trauma  No cervical spine tenderness  No tenderness or deformities anywhere of the other extremities except for the right ankle where there is swelling over the medial portion of the right ankle    Films reviewed and revealed a trimalleolar fracture    Plan is admit for podiatry  They are planning surgery tomorrow  We will sign off to their service tomorrow  Case seen discussed with Rosendo Guzman trauma PA    Patient seen and examined independently by me 4/26/2022     I personally supervised the PA/NP in the evaluation, management and development of the treatment plan for Domingo Wu  on the same date of service as above. I personally interviewed Domingo Wu   and  discussed his review of symptoms as able due to the patient's condition, as well as performed an individual physical exam on the same date of service as above. In addition I discussed the patient's condition and treatment options with the patient, if able, and/or designated family if available.   I have also reviewed and agree with the past medical,  family and social history updates as well as care plans unless otherwise noted below. All questions were answered. I examined independently and reviewed relevant data myself and may have done so in the context of team rounds. A full chart review was performed by me. I attest that this medical record entry accurately reflects signatures and notations that I made in my capacity as an M. D. when I treated and diagnosed Jackeline Shark on the date of service above     I was responsible for all medical decision making involving this encounter. I identified and/or confirmed all problems associated with this patient encounter by my own direct physical examination of this patient and review of all radiology studies and labwork  that were ordered and available. I  discussed the management of all of the identified problems with the APN or PA. I formulated the treatment plan for all identified problems and discussed those with the APN or PA . This management plan was then carried out and the patient's orders for care by the APN or PA. Please see our orders that were directed and approved by me if there are any new ones for the updated patient care plan. Above discussed and I agree with documentation and orders placed by Machelle Guzman PA    See my additional comments below for any other updated orders and plans.

## 2022-04-27 NOTE — PROGRESS NOTES
Discussed with podiatry resident due to the nature of isolated trimalleolar fracture, podiatric services graciously  agreed to take over as attending on the case. Trauma will sign off at this time. Please contact trauma services with any further concern for traumatic injury.   Electronically signed by Faye Jimenez PA-C on 4/27/2022 at 7:32 AM

## 2022-04-27 NOTE — PLAN OF CARE
Problem: Discharge Planning  Goal: Discharge to home or other facility with appropriate resources  Outcome: Progressing  Note: Pt plans ECF? at discharge. Care manager and social working helping with discharge needs. Problem: Pain  Goal: Verbalizes/displays adequate comfort level or baseline comfort level  Outcome: Progressing  Note: Pt report pain at 0-7 on scale. Pt states iv medication helping to achieve pain goal of a 5 on scale. Problem: Safety - Adult  Goal: Free from fall injury  Outcome: Progressing  Note: Pt using call light appropriately to call for assistance. Pt reports understanding of fall prevention when discussed. Problem: ABCDS Injury Assessment  Goal: Absence of physical injury  Outcome: Progressing  Note: Pt using call light appropriately to call for assistance. Pt reports understanding of fall prevention when discussed. Care plan reviewed with patient. Patient verbalize understanding of the plan of care and contribute to goal setting.

## 2022-04-27 NOTE — PROGRESS NOTES
[facility] 59  PHYSICAL THERAPY MISSED TREATMENT NOTE  [facility]    Date: 2022  Patient Name: Saumya Oquendo        MRN: [id number]   : 1970  (45 y.o.)  Gender: female                REASON FOR MISSED TREATMENT:  Hold treatment per nursing request.  Pt is on strict bedrest, planning for surgery for R ankle today, will also need orders for WB status, will check back tomorrow.      Eliane Law PT, DPT

## 2022-04-27 NOTE — FLOWSHEET NOTE
04/27/22 1018   Service Assessment   Patient Orientation Alert and Oriented   Cognition Short Term Memory Deficit   History Provided By Patient   Primary Caregiver Other (Comment)  (assisted living)   Support Systems Family Members   Can patient return to prior living arrangement Unknown at present   Social/Functional History   Type of Home Assisted living   Discharge Planning   Type of Residence Assisted living   Patient expects to be discharged to: Assisted living   Condition of Participation: Discharge Planning   The Patient and/or Patient Representative was provided with a Choice of Provider? Patient   The Patient and/Or Patient Representative agree with the Discharge Plan? Yes   Freedom of Choice list was provided with basic dialogue that supports the patient's individualized plan of care/goals, treatment preferences, and shares the quality data associated with the providers?   Yes  (declined list, prefers current placement)

## 2022-04-27 NOTE — PROGRESS NOTES
SBIRT screening completed per trauma protocol. SBIRT Findings are Negative. Patient denies alcohol and/or drug use. Also denies depressive symptoms.     Brief Intervention and Referral to Treatment Summary N/A    Patient was provided PHQ-9, AUDIT-C and DAST Screening:      PHQ-9 Score:  Negative  AUDIT-C Score:  Negative  DAST Score:   Negative    Patients substance use is considered-Negative    Low Risk/Healthy  Moderate Risk  Harmful  Dependent    Patients depression is considered:-Negative    Minimal  Mild   Moderate  Moderately Severe  Severe    Brief Education Was Provided N/A    Patient was receptive  Patient was not receptive      Brief Intervention Is Provided (Only for AUDIT-C or DAST) N/A    Patient reports readiness to decrease and/or stop use and a plan was discussed   Patient denies readiness to decrease and/or stop use and a plan was not discussed      Recommendations/Referrals for Brief and/or Specialized Treatment Provided to Patient  N/A

## 2022-04-27 NOTE — CARE COORDINATION
4/27/22, 10:21 AM EDT    DISCHARGE PLANNING EVALUATION      Spoke with Nelson Liz. She is from [address] [place] [address] assisted living, plans to return there as long as she is able to manage mobility. Spoke with [address] [place] [address], will follow to determine appropriate level of care at discharge. If skilled care is needed, [address] cannot accept [employer], but [facility] may be an option, if needed and if pt and family choose.

## 2022-04-27 NOTE — CARE COORDINATION
4/27/22, 7:53 AM EDT  DISCHARGE PLANNING EVALUATION:    Britney Irby       Admitted: 4/26/2022/ [address] day: 1   Location: -21/021-A Reason for admit: Trimalleolar fracture of right ankle, closed, initial encounter Jaydon Stanley   PMH:  has a past medical history of Anxiety, GERD (gastroesophageal reflux disease), History of pneumonia, Hyperlipemia, Hypertension, Moderate episode of recurrent major depressive disorder ([address] Obesity, Paroxysmal atrial fibrillation ([address] and Wernicke-Korsakoff syndrome ([address] To ED seen on behalf of Dr. Hilary Baker and presents with complaints of right ankle pain. Patient has short term memory loss and secondary to this cannot provide much history of present illness. Pt fell at [facility] earlier today. Rt ankle xray:  Fracture/subluxation of the ankle involving at least the lateral and medial malleoli. As mentioned above, cannot definitely exclude an associated posterior malleolar fracture. CT of the ankle would be helpful for further evaluation. CT right ankle:  Trimalleolar fracture subluxation. Procedure: planning surgery tonight by podiatry  Barriers to Discharge: NPO, hospitalist consulted for preop clearance, ice therapy, pain control. PCP: Lizeth Cornejo MD  Readmission Risk Score: 12 ( )%    Patient Goals/Plan/Treatment Preferences: From [facility] of [address] [place] [address] [place]; SW consulted to see/follow. Transportation/Food Security/Housekeeping Addressed:  No issues identified.

## 2022-04-27 NOTE — CONSULTS
Hospitalist Consult Note        Patient:  Juan Jose Francis  YOB: 1970  Date of Service: 4/27/2022  MRN: [id number]   Acct:  [de-identified]   Primary Care Physician: Alexandra Lemus MD    Chief Complaint:  Right Ankle pain  Reason for consult  Pre-operative clearance and medical management. Date of Service: Pt seen/examined in consultation on 4/27/2022     History Of Present Illness:      [place] y.o. female who we are asked to see/evaluate by Shan Ware DPM for medical management of preoperative risk assessment and medical management. Pt reports that she was walking on the sidewalk yesterday, and mis-stepped, rolled her ankle, and fell. She denies any symptoms prior to falling, including palpitations, chest pain, shortness of breath, vision changes, headache, lightheadedness or dizziness. She has significant right ankle and foot pain, however patient is planning to go to surgery today with Dr. Shadi Caballero. She continues to deny chest pain, shortness of breath, abdominal pain, lightheadedness, dizziness at this time. Assessment and Plan:-  1. Right trimalleolar fracture:    Managed by primary, with Dr. Shadi Caballero. Planned surgery later today. RCRI: 0 points; 3.9% 30-day risk of death, MI, or cardiac arrest following this procedure. MET score of 4-10. Pt is able to take care of herself, complete cleaning and taking care of home, is able to walk up a flight of stairs without symptoms, and regularly walks 0.5 mile a day outside. Pt has a PMHx of HTN and a. Fib, for which she takes metoprolol for. She denies taking anticoagulation or antiplatelet medications. Pt has hx of EtOH abuse and Wernicke-Korsakoff syndrome, however reports she has been sober for 1.5 years. No further cardiac work up needed at this time. Risks of surgery was discussed with patient, and patient accepts risk. Pt is overall low risk for surgery. 2. Essential HTN:    Blood pressures have been normal- soft today. Hold home metoprolol, aldactone. Continue to monitor BP closely. Consider resuming post-op. 3. Hx of Paroxysmal A. Fib:    Diagnosed in 2018 while hospitalized for pneumonia. Pt reports she has followed up with Cardiology following that hospitalization, and takes only metoprolol daily. EKG today reveals NSR. Metoprolol held for normal-soft Bps. Consider resuming post op. 4. Major depressive disorder:    Stable. Continue Prozac, Seroquel. 5. Hx of Wernicke-Korsakoff syndrome:    Stable. Continue Thiamine supplement. Per report, patient does experience short-term memory loss at times. Past Medical History:        Diagnosis Date    Anxiety     GERD (gastroesophageal reflux disease)     History of pneumonia 07/2018    Legionella requiring intubation    Hyperlipemia     Hypertension     Moderate episode of recurrent major depressive disorder ([facility] 1/12/2021    Obesity     Paroxysmal atrial fibrillation ([facility] 07/2018    during pneumonia hospitalization    Wernicke-Korsakoff syndrome ([facility] 1/12/2021       Past Surgical History:        Procedure Laterality Date    APPENDECTOMY      age 11    BARIATRIC SURGERY  05/25/2015    COLONOSCOPY  8/4/2010    DILATION AND CURETTAGE      KNEE ARTHROSCOPY Right     OVARY REMOVAL Right 2009    cyst    SINUS SURGERY      TUBAL LIGATION Bilateral 2009    WISDOM TOOTH EXTRACTION         Home Medications:   No current facility-administered medications on file prior to encounter.      Current Outpatient Medications on File Prior to Encounter   Medication Sig Dispense Refill    Nutritional Supplements (VITAMIN D BOOSTER PO) Take 1,000 mcg by mouth daily      diphenhydrAMINE (BENADRYL) 50 MG capsule Take 50 mg by mouth every 6 hours as needed for Itching      carbamide peroxide (DEBROX) 6.5 % otic solution Place 5 drops into both ears as needed      bisacodyl (DULCOLAX) 10 MG suppository Place 10 mg rectally as needed for Constipation      docusate sodium (COLACE) 100 MG capsule Take 100 mg by mouth 3 times daily as needed for Constipation      EPINEPHrine HCl, Anaphylaxis, (EPIPEN IM) Inject into the muscle as needed      hydrocortisone 1 % cream Apply topically 2 times daily Apply topically 2 times daily to face      aluminum & magnesium hydroxide-simethicone (MAALOX) 200-200-20 MG/5ML SUSP suspension Take by mouth as needed for Indigestion      magnesium hydroxide (MILK OF MAGNESIA) 400 MG/5ML suspension Take 30 mLs by mouth as needed for Constipation      polyethylene glycol (MIRALAX) 17 g PACK packet Take 17 g by mouth as needed      Sennosides (SENOKOT PO) Take 2 tablets by mouth daily as needed      acetaminophen (TYLENOL) 325 MG tablet Take 650 mg by mouth every 6 hours as needed for Pain      FLUoxetine (PROZAC) 20 MG capsule Take 20 mg by mouth daily      metoprolol succinate (TOPROL XL) 50 MG extended release tablet Take 50 mg by mouth daily      QUEtiapine (SEROQUEL) 50 MG tablet Take 50 mg by mouth nightly       spironolactone (ALDACTONE) 25 MG tablet Take 12.5 mg by mouth daily      thiamine mononitrate 100 MG tablet Take 100 mg by mouth daily      LORazepam (ATIVAN) 1 MG tablet Take 1 mg by mouth every 12 hours as needed for Anxiety.  EPINEPHrine (EPIPEN 2-LUZ) 0.3 MG/0.3ML SOAJ injection Inject 0.3 mLs into the muscle once for 1 dose Use as directed for allergic reaction 2 each 0    Ascorbic Acid (VITAMIN C) 500 MG CAPS Take 1 tablet by mouth daily         Allergies:    Bactrim [sulfamethoxazole-trimethoprim], Codeine, and Flagyl [metronidazole]    Social History:    reports that she has never smoked. She has never used smokeless tobacco. She reports previous alcohol use of about 3.0 standard drinks of alcohol per week. She reports that she does not use drugs.     Family History:       Problem Relation Age of Onset    Endometrial Cancer Mother     Diabetes Father     High Blood Pressure Brother        Diet:  Diet NPO    Review of systems:   Pertinent positives as noted in the HPI. All other systems reviewed and negative. PHYSICAL EXAM:  /77   Pulse 62   Temp 98.6 °F (37 °C) (Oral)   Resp 16   Ht 5' 8\" (1.727 m)   Wt 192 lb 12.8 oz (87.5 kg)   LMP 05/01/2016   SpO2 97%   BMI 29.32 kg/m²   General appearance: No apparent distress, appears stated age and cooperative. HEENT: Normal cephalic, atraumatic without obvious deformity. Pupils equal, round, and reactive to light. Extra ocular muscles intact. Conjunctivae/corneas clear. Neck: Supple, with full range of motion. No jugular venous distention. Trachea midline. Respiratory:  Normal respiratory effort. Clear to auscultation, bilaterally without Rales/Wheezes/Rhonchi. Cardiovascular: Regular rate and rhythm with normal S1/S2 without murmurs, rubs or gallops. Abdomen: Soft, non-tender, non-distended with normal bowel sounds. Musculoskeletal: Left ankle and foot wrapped in ace-bandage. No clubbing, cyanosis or edema bilaterally. Skin: Skin color, texture, turgor normal.  No rashes or lesions. Neurologic:  Neurovascularly intact without any focal sensory/motor deficits. Cranial nerves: II-XII intact, grossly non-focal.  Psychiatric: Alert and oriented, thought content appropriate, normal insight  Capillary Refill: Brisk,< 3 seconds   Peripheral Pulses: +2 palpable, equal bilaterally     Labs:   Recent Labs     04/26/22  1458 04/27/22  0519   WBC 11.9* 9.9   HGB 13.4 12.4   HCT 42.8 38.7    247     Recent Labs     04/26/22  1458 04/27/22  0519    139   K 4.8 3.9    106   CO2 25 22*   BUN 14 10   CREATININE 0.7 0.7   CALCIUM 9.2 8.7     Recent Labs     04/26/22  1458 04/27/22  0519   AST 19 15   ALT 9* 9*   BILITOT 0.2* 0.5   ALKPHOS 77 74     No results for input(s): INR in the last 72 hours. No results for input(s): Bailee Comment in the last 72 hours.     Urinalysis:    Lab Results   Component Value Date    NITRU NEGATIVE 06/23/2014    WBCUA None 06/23/2014    BACTERIA 3+ 06/23/2014    RBCUA 0 TO 4 06/23/2014    SPECGRAV 1.005 06/23/2014    GLUCOSEU NEGATIVE 06/23/2014       Radiology:   XR ANKLE RIGHT (2 VIEWS)   Final Result   1. Stable alignment of trimalleolar fracture of the right ankle, narrowing    cast.      This document has been electronically signed by: Iman Blakely MD on    04/26/2022 06:40 PM      XR ANKLE RIGHT (2 VIEWS)   Final Result   1. Improved alignment of minimally displaced trimalleolar fractures of the    right ankle. 2. Diffuse soft tissue edema overlying the right ankle. This document has been electronically signed by: Iman Blakely MD on    04/26/2022 06:53 PM      CT ANKLE RIGHT WO CONTRAST   Final Result   Trimalleolar fracture subluxation. **This report has been created using voice recognition software. It may contain minor errors which are inherent in voice recognition technology. **      Final report electronically signed by Dr. Jun Gan on 4/26/2022 4:53 PM      XR ANKLE RIGHT (MIN 3 VIEWS)   Final Result   Fracture/subluxation of the ankle involving at least the lateral and medial malleoli. As mentioned above, cannot definitely exclude an associated posterior malleolar fracture. CT of the ankle would be helpful for further evaluation. **This report has been created using voice recognition software. It may contain minor errors which are inherent in voice recognition technology. **      Final report electronically signed by Dr. Jun Gan on 4/26/2022 3:14 PM        XR ANKLE RIGHT (2 VIEWS)    Result Date: 4/26/2022  2 views of the right ankle. COMPARISON: Radiographs of the right ankle dated 4/26/2022 at 3:10 PM FINDINGS: Soft tissue edema overlying the ankle. There is improved in alignment of minimally displaced trimalleolar fracture of the distal right tibia and fibula on the talus. Talar dome appears intact. Visualized tarsal bones appear intact.      1. Improved alignment of minimally displaced trimalleolar fractures of the right ankle. 2. Diffuse soft tissue edema overlying the right ankle. This document has been electronically signed by: Flo Mendez MD on 04/26/2022 06:53 PM    XR ANKLE RIGHT (2 VIEWS)    Result Date: 4/26/2022  1 view of the right ankle. COMPARISON: Radiographs of the right ankle dated 4/26/2022 at 5:57 PM FINDINGS: Interval placement of trimalleolar fracture of the right ankle in a cast. Fracture is poorly visualized on single AP imaging and secondary to overlying cast. Alignment on AP imaging appears stable. Talar dome appears intact. 1. Stable alignment of trimalleolar fracture of the right ankle, narrowing cast. This document has been electronically signed by: Flo Mendez MD on 04/26/2022 06:40 PM    XR ANKLE RIGHT (MIN 3 VIEWS)    Result Date: 4/26/2022  PROCEDURE:XR ANKLE RIGHT (MIN 3 VIEWS) CLINICAL INFORMATION: Fall, right ankle swelling TECHNIQUE: 3 standard views of the right ankle were obtained. FINDINGS:  There is a fracture/subluxation of the ankle. The talus is displaced posteriorly and laterally about 17 mm. There is a mildly comminuted fracture through the base of the medial malleolus. The major distal malleolar fragment displaced laterally  with the displaced talus. An oblique fracture involves the lateral malleolus. The distal lateral malleolus fragment is also displaced laterally with the displaced talus. Cannot exclude a posterior malleolar fracture. Moderate soft tissue swelling overlies the ankle medially, laterally, and anteriorly. Fracture/subluxation of the ankle involving at least the lateral and medial malleoli. As mentioned above, cannot definitely exclude an associated posterior malleolar fracture. CT of the ankle would be helpful for further evaluation. **This report has been created using voice recognition software. It may contain minor errors which are inherent in voice recognition technology. ** Final report electronically signed by Dr. Jaiden Lacy on 4/26/2022 3:14 PM    CT ANKLE RIGHT WO CONTRAST    Result Date: 4/26/2022  PROCEDURE: NONCONTRAST CT RIGHT ANKLE: CLINICAL INFORMATION: fracture TECHNIQUE: Multiple axial 3 mm images of the right ankle were obtained without administration of intravenous contrast material. Computer generated sagittal and coronal images of the right ankle were also reconstructed. ALL CT SCANS AT THIS FACILITY use dose modulation, iterative reconstruction, and/or weight-based dosing when appropriate to reduce radiation dose to as low as reasonably achievable. FINDINGS: There is a mildly comminuted displaced medial malleolar fracture and a mildly displaced oblique fracture of the lateral malleolus. The distal medial lateral malleolar fragments are displaced laterally with the laterally subluxed talus, approximately 7 mm. The distal lateral malleolus fragment is also displaced posteriorly, also approximately 7 mm. the talus is also mildly subluxed posteriorly relative to the distal tibia. There is also a small chip fracture of the posterior malleolus with only slight displacement. Trimalleolar fracture subluxation. **This report has been created using voice recognition software. It may contain minor errors which are inherent in voice recognition technology. ** Final report electronically signed by Dr. Jaiden Lacy on 4/26/2022 4:53 PM        EKG: NSR with non specific T wave abdnormality      [address]    Electronically signed by Riaz Torres PA-C on 4/27/2022 at 12:19 PM

## 2022-04-27 NOTE — PROGRESS NOTES
[address] [employer] THERAPY MISSED TREATMENT NOTE  [facility] ORTHOPEDICS 7K  7K-/021-A      Date: 2022  Patient Name: Ralph Franco        CSN: [id number]   : 1970  (45 y.o.)  Gender: female                REASON FOR MISSED TREATMENT: Hold Treatment per Nursing. Pt is on strict bedrest, planning for surgery for R ankle today, will also need orders for WB status, OT to check back.

## 2022-04-28 LAB
ALBUMIN SERPL-MCNC: 3.6 G/DL (ref 3.5–5.1)
ALP BLD-CCNC: 72 U/L (ref 38–126)
ALT SERPL-CCNC: 8 U/L (ref 11–66)
ANION GAP SERPL CALCULATED.3IONS-SCNC: 14 MEQ/L (ref 8–16)
AST SERPL-CCNC: 17 U/L (ref 5–40)
BASOPHILS # BLD: 0.2 %
BASOPHILS ABSOLUTE: 0 THOU/MM3 (ref 0–0.1)
BILIRUB SERPL-MCNC: 0.4 MG/DL (ref 0.3–1.2)
BUN BLDV-MCNC: 7 MG/DL (ref 7–22)
CALCIUM SERPL-MCNC: 8.6 MG/DL (ref 8.5–10.5)
CHLORIDE BLD-SCNC: 104 MEQ/L (ref 98–111)
CO2: 20 MEQ/L (ref 23–33)
CREAT SERPL-MCNC: 0.6 MG/DL (ref 0.4–1.2)
EOSINOPHIL # BLD: 0 %
EOSINOPHILS ABSOLUTE: 0 THOU/MM3 (ref 0–0.4)
ERYTHROCYTE [DISTWIDTH] IN BLOOD BY AUTOMATED COUNT: 12.7 % (ref 11.5–14.5)
ERYTHROCYTE [DISTWIDTH] IN BLOOD BY AUTOMATED COUNT: 41 FL (ref 35–45)
GFR SERPL CREATININE-BSD FRML MDRD: > 90 ML/MIN/1.73M2
GLUCOSE BLD-MCNC: 118 MG/DL (ref 70–108)
HCT VFR BLD CALC: 36.4 % (ref 37–47)
HEMOGLOBIN: 11.8 GM/DL (ref 12–16)
IMMATURE GRANS (ABS): 0.04 THOU/MM3 (ref 0–0.07)
IMMATURE GRANULOCYTES: 0.4 %
LYMPHOCYTES # BLD: 15.8 %
LYMPHOCYTES ABSOLUTE: 1.5 THOU/MM3 (ref 1–4.8)
MCH RBC QN AUTO: 28.7 PG (ref 26–33)
MCHC RBC AUTO-ENTMCNC: 32.4 GM/DL (ref 32.2–35.5)
MCV RBC AUTO: 88.6 FL (ref 81–99)
MONOCYTES # BLD: 5.1 %
MONOCYTES ABSOLUTE: 0.5 THOU/MM3 (ref 0.4–1.3)
NUCLEATED RED BLOOD CELLS: 0 /100 WBC
PLATELET # BLD: 227 THOU/MM3 (ref 130–400)
PMV BLD AUTO: 11.1 FL (ref 9.4–12.4)
POTASSIUM REFLEX MAGNESIUM: 4.2 MEQ/L (ref 3.5–5.2)
RBC # BLD: 4.11 MILL/MM3 (ref 4.2–5.4)
SEG NEUTROPHILS: 78.5 %
SEGMENTED NEUTROPHILS ABSOLUTE COUNT: 7.5 THOU/MM3 (ref 1.8–7.7)
SODIUM BLD-SCNC: 138 MEQ/L (ref 135–145)
TOTAL PROTEIN: 6.3 G/DL (ref 6.1–8)
WBC # BLD: 9.6 THOU/MM3 (ref 4.8–10.8)

## 2022-04-28 PROCEDURE — 36415 COLL VENOUS BLD VENIPUNCTURE: CPT

## 2022-04-28 PROCEDURE — 6370000000 HC RX 637 (ALT 250 FOR IP)

## 2022-04-28 PROCEDURE — 97530 THERAPEUTIC ACTIVITIES: CPT

## 2022-04-28 PROCEDURE — 1200000000 HC SEMI PRIVATE

## 2022-04-28 PROCEDURE — 6370000000 HC RX 637 (ALT 250 FOR IP): Performed by: PHYSICIAN ASSISTANT

## 2022-04-28 PROCEDURE — 2580000003 HC RX 258: Performed by: PHYSICIAN ASSISTANT

## 2022-04-28 PROCEDURE — 6370000000 HC RX 637 (ALT 250 FOR IP): Performed by: STUDENT IN AN ORGANIZED HEALTH CARE EDUCATION/TRAINING PROGRAM

## 2022-04-28 PROCEDURE — 80053 COMPREHEN METABOLIC PANEL: CPT

## 2022-04-28 PROCEDURE — 2500000003 HC RX 250 WO HCPCS: Performed by: PHYSICIAN ASSISTANT

## 2022-04-28 PROCEDURE — 97535 SELF CARE MNGMENT TRAINING: CPT

## 2022-04-28 PROCEDURE — 85025 COMPLETE CBC W/AUTO DIFF WBC: CPT

## 2022-04-28 PROCEDURE — 97162 PT EVAL MOD COMPLEX 30 MIN: CPT

## 2022-04-28 PROCEDURE — 97166 OT EVAL MOD COMPLEX 45 MIN: CPT

## 2022-04-28 RX ORDER — OXYCODONE HYDROCHLORIDE AND ACETAMINOPHEN 5; 325 MG/1; MG/1
1 TABLET ORAL EVERY 6 HOURS PRN
Status: DISCONTINUED | OUTPATIENT
Start: 2022-04-28 | End: 2022-05-02 | Stop reason: HOSPADM

## 2022-04-28 RX ORDER — FAMOTIDINE 20 MG/1
20 TABLET, FILM COATED ORAL 2 TIMES DAILY
Status: DISCONTINUED | OUTPATIENT
Start: 2022-04-28 | End: 2022-05-02 | Stop reason: HOSPADM

## 2022-04-28 RX ORDER — OXYCODONE HYDROCHLORIDE AND ACETAMINOPHEN 5; 325 MG/1; MG/1
2 TABLET ORAL EVERY 4 HOURS PRN
Status: DISCONTINUED | OUTPATIENT
Start: 2022-04-28 | End: 2022-05-02 | Stop reason: HOSPADM

## 2022-04-28 RX ORDER — OXYCODONE HYDROCHLORIDE AND ACETAMINOPHEN 5; 325 MG/1; MG/1
1 TABLET ORAL EVERY 4 HOURS PRN
Status: DISCONTINUED | OUTPATIENT
Start: 2022-04-28 | End: 2022-04-28

## 2022-04-28 RX ORDER — OXYCODONE HYDROCHLORIDE AND ACETAMINOPHEN 5; 325 MG/1; MG/1
2 TABLET ORAL EVERY 4 HOURS PRN
Status: DISCONTINUED | OUTPATIENT
Start: 2022-04-28 | End: 2022-04-28

## 2022-04-28 RX ADMIN — ACETAMINOPHEN 650 MG: 325 TABLET ORAL at 12:40

## 2022-04-28 RX ADMIN — QUETIAPINE FUMARATE 50 MG: 25 TABLET ORAL at 20:04

## 2022-04-28 RX ADMIN — SODIUM CHLORIDE 1000 ML: 9 INJECTION, SOLUTION INTRAVENOUS at 03:48

## 2022-04-28 RX ADMIN — SODIUM CHLORIDE: 9 INJECTION, SOLUTION INTRAVENOUS at 11:55

## 2022-04-28 RX ADMIN — FLUOXETINE 20 MG: 20 CAPSULE ORAL at 08:34

## 2022-04-28 RX ADMIN — Medication 100 MG: at 10:53

## 2022-04-28 RX ADMIN — SODIUM CHLORIDE: 9 INJECTION, SOLUTION INTRAVENOUS at 20:10

## 2022-04-28 RX ADMIN — SODIUM CHLORIDE, PRESERVATIVE FREE 10 ML: 5 INJECTION INTRAVENOUS at 08:34

## 2022-04-28 RX ADMIN — POLYETHYLENE GLYCOL 3350 17 G: 17 POWDER, FOR SOLUTION ORAL at 08:33

## 2022-04-28 RX ADMIN — OXYCODONE HYDROCHLORIDE AND ACETAMINOPHEN 500 MG: 500 TABLET ORAL at 08:34

## 2022-04-28 RX ADMIN — OXYCODONE HYDROCHLORIDE AND ACETAMINOPHEN 2 TABLET: 5; 325 TABLET ORAL at 22:22

## 2022-04-28 RX ADMIN — OXYCODONE HYDROCHLORIDE AND ACETAMINOPHEN 2 TABLET: 5; 325 TABLET ORAL at 15:51

## 2022-04-28 RX ADMIN — FAMOTIDINE 20 MG: 10 INJECTION, SOLUTION INTRAVENOUS at 08:33

## 2022-04-28 RX ADMIN — FAMOTIDINE 20 MG: 20 TABLET ORAL at 20:04

## 2022-04-28 ASSESSMENT — PAIN SCALES - GENERAL
PAINLEVEL_OUTOF10: 4
PAINLEVEL_OUTOF10: 0
PAINLEVEL_OUTOF10: 8
PAINLEVEL_OUTOF10: 7
PAINLEVEL_OUTOF10: 0
PAINLEVEL_OUTOF10: 7
PAINLEVEL_OUTOF10: 8

## 2022-04-28 ASSESSMENT — PAIN SCALES - WONG BAKER: WONGBAKER_NUMERICALRESPONSE: 0

## 2022-04-28 ASSESSMENT — PAIN DESCRIPTION - ORIENTATION: ORIENTATION: RIGHT

## 2022-04-28 ASSESSMENT — PAIN DESCRIPTION - LOCATION: LOCATION: ANKLE

## 2022-04-28 NOTE — PROGRESS NOTES
[facility]  INPATIENT PHYSICAL THERAPY  EVALUATION  [facility] ORTHOPEDICS 7K - 7K-21/021-A    Time In: 5  Time Out: 1125  Timed Code Treatment Minutes: 14 Minutes  Minutes: 23          Date: 2022  Patient Name: Yvonne Velazquez,  Gender:  female        MRN: [id number]  : 1970  (45 y.o.)      Referring Practitioner: BHASKAR Espinoza  Diagnosis: trimalleolar fracture of R ankle  Additional Pertinent Hx: per EMR Woodrow Alvarez is a 45 y.o. female evaluated for trauma consult, brought by EMS following a mechanical with no LOC; past medical history short-term memory loss, Warnicke Korsakoff syndrome, A. fib, HTN, anxiety, major depressive disorder. Per report patient was walking with another individual when she fell on the grass and was unable to stand up and ambulate following incident. She states she has no pain, just that her right ankle feels \"weird\". Patient reports odd sensation in right ankle. Patient denies chest pain, shortness of breath, cough, headache, dizziness, lightheadedness, numbness, paraesthesias, weakness, chills, fevers, abdominal pain, nausea, vomiting, diarrhea, blood in stool, neck pain, or back pain. Care in coordination with trauma surgeon Dr. Berhane Padgett. \" Pt is s/p ORIF Trimalleolar Ankle Fracture Right completed by Dr. Nigel Stark on 22. Restrictions/Precautions:  Restrictions/Precautions: General Precautions,Fall Risk,Weight Bearing  Right Lower Extremity Weight Bearing: Non Weight Bearing  Position Activity Restriction  Other position/activity restrictions: cast on R LE    Subjective:  Chart Reviewed: Yes  Patient assessed for rehabilitation services?: Yes  Family / Caregiver Present: No  Subjective: OK to see pt per nursing. Pt in bed when PT arrived, pleasant and ageeable to PT session. Pt willing to get in bedside chair for lunch.     General:  Overall Orientation Status: Within Normal Limits  Orientation Level: Oriented X4    Vision Exceptions: Wears glasses at all times    Hearing: Within functional limits         Pain: 4/10: R LE    Vitals: Vitals not assessed per clinical judgement, see nursing flowsheet    Social/Functional History:    Lives With: Alone  Type of Home: Assisted living  Home Layout: One level  Home Access: Level entry  Home Equipment: Grab bars     Bathroom Shower/Tub: Walk-in shower  Bathroom Toilet: Standard  Bathroom Equipment: Grab bars in shower    Receives Help From: Other (comment) (facility staff)  ADL Assistance: Independent  Homemaking Assistance: Needs assistance  Ambulation Assistance: Independent  Transfer Assistance: Independent    Active : No     Additional Comments: Pt reprots she is IND with all functional mobility prior with no AD. Pt IND with ADLs. Pt in ADONAY due to \"loss of short term memory. \"    OBJECTIVE:  Range of Motion:  Right Lower Extremity: WNL  Left Lower Extremity: Impaired - not able to assess ankle due to cast applied    Strength:  Right Lower Extremity: Impaired - not able to assess ankle due to cast, 3+/5 hip, 4-/5 knee  Left Lower Extremity: WNL    Balance:  Static Sitting Balance:  Independent  Dynamic Sitting Balance: Supervision  Static Standing Balance: Contact Guard Assistance, X 1, with cues for safety, with verbal cues   RW for support once in standing  Bed Mobility:  Rolling to Left: Supervision, X 1, with head of bed raised   Supine to Sit: Supervision, X 1, with head of bed raised, with rail    Transfers:  Sit to Stand: [employer], X 1, with verbal cues  Stand to [facility], X 1, with verbal cues  Stand Pivot:Contact Guard Assistance, X 1, cues for hand placement  RW for support as needed  Ambulation:  Contact Guard Assistance, X 1, with cues for safety, with verbal cues , with increased time for completion  Distance: 35 feet  Surface: Level Tile  Device:Rolling Walker  Gait Deviations:  Slow Enma, Decreased Step Length on Right and Decreased Gait Speed, \"hopping pattern\" on L LE to maintain WB status with good demo   provided pt with RW during session, communicated with SW about pt needing a RW at d/c. Functional Outcome Measures: Completed  AM-PAC Inpatient Mobility without Stair Climbing Raw Score : 15  AM-PAC Inpatient without Stair Climbing T-Scale Score : 43.03    ASSESSMENT:  Activity Tolerance:  Patient tolerance of  treatment: good. Treatment Initiated: Treatment and education initiated within context of evaluation. Evaluation time included review of current medical information, gathering information related to past medical, social and functional history, completion of standardized testing, formal and informal observation of tasks, assessment of data and development of plan of care and goals. Treatment time included skilled education and facilitation of tasks to increase safety and independence with functional mobility for improved independence and quality of life. Assessment: Body Structures, Functions, Activity Limitations Requiring Skilled Therapeutic Intervention: Decreased functional mobility ,Decreased endurance,Increased pain,Decreased balance,Decreased strength,Decreased safe awareness  Assessment: Pt IND and active prior to stay with no use of an AD. Pt requires use of RW for support with NWB on R LE. Pt cont to require skilled PT services to progress towards PLOF, increase safety and decrease risk for falls. Therapy Prognosis: Good    Requires PT Follow-Up: Yes    Discharge Recommendations:  Discharge Recommendations: Continue to assess pending progress,24 hour supervision or assist,Home with Home health PT,Patient would benefit from continued therapy after discharge    Patient Education:   .     Patient Education  Education Given To: Patient  Education Provided: Role of Therapy,Plan of Care,Transfer Training,Fall Prevention Strategies,Equipment,Precautions  Education Method: Demonstration,Verbal,Teach Back  Education Outcome: Verbalized understanding,Demonstrated understanding,Continued education needed      Equipment Recommendations:  Equipment Needed: Yes  Mobility Devices: Nobie [place]: Rolling    Plan:  Specific Instructions for Next Treatment: gait, mobility, ther ex  Current Treatment Recommendations: Strengthening,Equipment evaluation, education, & procurement,Gait training,Balance training,Transfer training,Home exercise program,Endurance training,Patient/Caregiver education & training,Therapeutic activities,Safety education & training,Neuromuscular re-education  Plan:  (6x O)  Specific Instructions for Next Treatment: gait, mobility, ther ex    Goals:  Patient goals : \"get better, walk\"  Short Term Goals  Time Frame for Short term goals: by discharge  Short term goal 1: Pt will amb for >50 feet with S with no LOB with good demo of WB status and RW for support to progress towards PLOF. Short term goal 2: Pt will demo IND with transfers with RW for support with good demo of WB status with good safety to progress with mobility. Short term goal 3: Pt will demo IND with bed mobility tasks with bed flat to progress with mobility. Short term goal 4: Pt will tolerate 10-20 reps of ther ex to increase overall mobility. Long Term Goals  Time Frame for Long term goals : NA due to short ELOS    Following session, patient left in safe position with all fall risk precautions in place. In chair, alarm on, nursing aware.

## 2022-04-28 NOTE — PROGRESS NOTES
Vickie Mosley 59  INPATIENT OCCUPATIONAL THERAPY  [facility] ORTHOPEDICS 7K  EVALUATION    Time:   Time In: 9095  Time Out: 1435  Timed Code Treatment Minutes: 10 Minutes  Minutes: 19          Date: 2022  Patient Name: Juan Jose Grade,   Gender: female      MRN: [id number]  : 1970  (45 y.o.)  Referring Practitioner: BHASKAR Suarez  Diagnosis: trimalleoar fracture of right ankle, closed, inital encounter  Additional Pertinent Hx: per EMR Ruth Baxter is a 45 y.o. female evaluated for trauma consult, brought by EMS following a mechanical with no LOC; past medical history short-term memory loss, Warnicke Korsakoff syndrome, A. fib, HTN, anxiety, major depressive disorder. Per report patient was walking with another individual when she fell on the grass and was unable to stand up and ambulate following incident. She states she has no pain, just that her right ankle feels \"weird\". Patient reports odd sensation in right ankle. Patient denies chest pain, shortness of breath, cough, headache, dizziness, lightheadedness, numbness, paraesthesias, weakness, chills, fevers, abdominal pain, nausea, vomiting, diarrhea, blood in stool, neck pain, or back pain. Care in coordination with trauma surgeon Dr. Kiran Gutierres. \" Pt is s/p ORIF Trimalleolar Ankle Fracture Right completed by Dr. Shadi Caballero on 22. Restrictions/Precautions:  Restrictions/Precautions: General Precautions,Fall Risk,Weight Bearing  Right Lower Extremity Weight Bearing: Non Weight Bearing  Position Activity Restriction  Other position/activity restrictions: cast on R LE    Subjective  Chart Reviewed: Orders,Yes,Progress Notes,Operative Notes  Patient assessed for rehabilitation services?: Yes  Response to previous treatment: Patient with no complaints from previous session  Family / Caregiver Present: No    Subjective: RN approved of OT session. Pt supine in bed and agreeable to therapy.     Pain: 8/10: R ankle - RN notified     Vitals: Vitals not assessed per clinical judgement, see nursing flowsheet    Social/Functional History:  Lives With: Alone  Type of Home: Assisted living  Home Layout: One level  Home Access: Level entry  Home Equipment: Grab bars   Bathroom Shower/Tub: Walk-in shower  Bathroom Toilet: Standard  Bathroom Equipment: Grab bars in shower    Receives Help From: Other (comment) (facility staff)  ADL Assistance: Independent  Homemaking Assistance: Needs assistance  Ambulation Assistance: Independent  Transfer Assistance: Independent    Active : No  Patient's  Info: facility provides transportation services     Additional Comments: Pt reprots she is IND with all functional mobility prior with no AD. Pt IND with ADLs. Pt in ADONAY due to \"loss of short term memory. \"    VISION:Corrected    HEARING:  WFL    COGNITION: Impulsive    RANGE OF MOTION:  Bilateral Upper Extremity:  WFL    STRENGTH:  Bilateral Upper Extremity:  WFL    SENSATION:   WFL    ADL:   Bathing: Stand By Assistance. for cleaning UB, LB, periarea, and washing hair while sitting. Completed task on BSC positoned in shower. Upper Extremity Dressing: Minimal Assistance. for gown management   Lower Extremity Dressing: Stand By Assistance.  don/doff left sock   Toileting: Stand By Assistance. pericare while seated   Toilet Transfer: Contact Guard Assistance. onto [facility] . BALANCE:  Sitting Balance:  Stand By Assistance. Standing Balance: Contact Guard Assistance. good compliance with NWB of RLE     BED MOBILITY:  Supine to Sit: Stand By Assistance    Sit to Supine: Stand By Assistance      TRANSFERS:  Sit to Stand:  Contact Guard Assistance. good compliance of NWB of RLE   Stand to Sit: Contact Guard Assistance. good compliance of NWB of RLe     FUNCTIONAL MOBILITY:  Assistive Device: Rolling Walker  Assist Level:  Contact Guard Assistance. Distance: To and from bathroom  Completed via hopping method. . good compliance of NWB of RLE     Activity Tolerance:  Patient tolerance of  treatment: good. Assessment:  Assessment: Pt admitted to the hospital with the diagnosis of Trimalleolar fracture of right ankle. Pt demo'ed performance deficits with requiring SBA for ADL tasks, and requires CGA for functional mobility and transfers which effect ability to perform ADLs and IADLs. Pt displayed  decreased endurance, balance, safety awareness and ability to complete  ADL tasks and mobility at OF. Pt requires further skilled OT Services to improve performance in functional tasks and educate on safety awareness for more indep with ADL tasks and mobility to return  home. Performance deficits / Impairments: Decreased functional mobility ,Decreased ADL status,Decreased strength,Decreased endurance,Decreased balance,Decreased vision/visual deficit  Prognosis: Good  REQUIRES OT FOLLOW-UP: Yes  Decision Making: Medium Complexity    Treatment Initiated: Treatment and education initiated within context of evaluation. Evaluation time included review of current medical information, gathering information related to past medical, social and functional history, completion of standardized testing, formal and informal observation of tasks, assessment of data and development of plan of care and goals. Treatment time included skilled education and facilitation of tasks to increase safety and independence with ADL's for improved functional independence and quality of life.     Discharge Recommendations:  Continue to assess pending progress,Home with Home health OT    Patient Education:     Patient Education  Education Given To: Patient  Education Provided: Role of Janaetal 2 Strategies,Transfer Training    Equipment Recommendations:  Equipment Needed: Yes (RW)    Plan:  Times per Week: 5x  Times per Day: Daily  Current Treatment Recommendations: Strengthening,Balance training,Functional mobility training,Endurance training,Patient/Caregiver education & training,Safety education & training,Self-Care / ADL. See long-term goal time frame for expected duration of plan of care. If no long-term goals established, a short length of stay is anticipated. Goals:  Patient goals : return back to AL  Short Term Goals  Time Frame for Short term goals: by discharge  Short Term Goal 1: pt will complete functional mobility to/from bathroom with maintaining NWB of RLE mod-I to access ADLs  Short Term Goal 2: pt will complete BADL routine with mod-I with maintaining NWB of RLE to increase indep with self care tasks  Short Term Goal 3: pt will complete sit<>stand transfers, including to/from toilet, mod-I to access toilet. Following session, patient left in safe position with all fall risk precautions in place.

## 2022-04-28 NOTE — PROGRESS NOTES
Podiatric Progress Note  Hayley Charles  Subjective :   4/28/2020 to  Patient seen at bedside today behalf of Dr. Juhi Prater. Patient appeared pleasant, was oriented to person, place and time and in no acute distress. Patient is 1 day s/p ORIF of right ankle fracture. Patient currently endorses mild to moderate pain to her right lower leg. Patient denies any N/V/F/C/SOB or CP. Patient has no further pedal concerns at this point in time. Patient wanted to know when she could be discharged home. HISTORY OF PRESENT ILLNESS:    The patient is a 45 y.o. female with significant past medical history of Wernicke-Korsakoff syndrome, paroxysmal atrial fibrillation, major depressive disorder, and GERD who is seen on behalf of Dr. Adele Mathew and presents with complaints of right ankle pain. Patient has short term memory loss and secondary to this cannot provide much history of present illness. Per ER Physician and Resident, patient sustained a fall at a nursing facility earlier today. It did not appear that she sustained any other injuries. She was then brought to [facility] for further evaluation and management. At this point in time. Patient says that she does not have any pain in her right ankle. She does state that it is uncomfortable and is wondering if there is anything that we can do to make it feel better. She denies any pain elsewhere. Patient endorses sensation to her right foot. Patient denies any nausea, vomiting, fever, chills, chest pain, shortness of breath. No other pedal complaints.     Current Medications:    Current Facility-Administered Medications: famotidine (PEPCID) tablet 20 mg, 20 mg, Oral, BID  oxyCODONE-acetaminophen (PERCOCET) 5-325 MG per tablet 1 tablet, 1 tablet, Oral, Q6H PRN **OR** oxyCODONE-acetaminophen (PERCOCET) 5-325 MG per tablet 2 tablet, 2 tablet, Oral, Q4H PRN  ascorbic acid (VITAMIN C) tablet 500 mg, 500 mg, Oral, Daily  FLUoxetine (PROZAC) capsule 20 mg, 20 mg, Oral, Daily  metoprolol succinate (TOPROL XL) extended release tablet 50 mg, 50 mg, Oral, Daily  QUEtiapine (SEROQUEL) tablet 50 mg, 50 mg, Oral, Nightly  spironolactone (ALDACTONE) tablet 12.5 mg, 12.5 mg, Oral, Daily  thiamine tablet 100 mg, 100 mg, Oral, Daily  0.9 % sodium chloride infusion, , IntraVENous, Continuous  sodium chloride flush 0.9 % injection 5-40 mL, 5-40 mL, IntraVENous, 2 times per day  sodium chloride flush 0.9 % injection 5-40 mL, 5-40 mL, IntraVENous, PRN  0.9 % sodium chloride infusion, 25 mL, IntraVENous, PRN  acetaminophen (TYLENOL) tablet 650 mg, 650 mg, Oral, Q4H PRN  ondansetron (ZOFRAN-ODT) disintegrating tablet 4 mg, 4 mg, Oral, Q8H PRN **OR** ondansetron (ZOFRAN) injection 4 mg, 4 mg, IntraVENous, Q6H PRN  polyethylene glycol (GLYCOLAX) packet 17 g, 17 g, Oral, Daily  bisacodyl (DULCOLAX) suppository 10 mg, 10 mg, Rectal, Daily  senna (SENOKOT) tablet 8.6 mg, 1 tablet, Oral, Daily PRN  fentaNYL (SUBLIMAZE) injection 25 mcg, 25 mcg, IntraVENous, Q1H PRN **OR** fentaNYL (SUBLIMAZE) injection 50 mcg, 50 mcg, IntraVENous, Q1H PRN    Objective     /75   Pulse 82   Temp 98 °F (36.7 °C) (Oral)   Resp 16   Ht 5' 8\" (1.727 m)   Wt 192 lb 12.8 oz (87.5 kg)   LMP 05/01/2016   SpO2 98%   BMI 29.32 kg/m²      I/O:    Intake/Output Summary (Last 24 hours) at 4/28/2022 1505  Last data filed at 4/28/2022 1311  Gross per 24 hour   Intake 1450 ml   Output    Net 1450 ml              Wt Readings from Last 3 Encounters:   04/26/22 192 lb 12.8 oz (87.5 kg)   11/06/20 202 lb 9.6 oz (91.9 kg)   10/07/19 172 lb (78 kg)       LABS:    Recent Labs     04/27/22  0519 04/28/22  0535   WBC 9.9 9.6   HGB 12.4 11.8*   HCT 38.7 36.4*    227        Recent Labs     04/28/22  0535      K 4.2      CO2 20*   BUN 7   CREATININE 0.6        Recent Labs     04/27/22  0519 04/28/22  0535   PROT 6.4 6.3      No results for input(s): CKTOTAL, CKMB, CKMBINDEX, TROPONINI in the last 72 hours. Focused Lower Extremity Examination:    Vitals:    /75   Pulse 82   Temp 98 °F (36.7 °C) (Oral)   Resp 16   Ht 5' 8\" (1.727 m)   Wt 192 lb 12.8 oz (87.5 kg)   LMP 05/01/2016   SpO2 98%   BMI 29.32 kg/m²      Vascular: Skin temperature is warm to digits 1 through 5 of right foot. CFT less than 3 seconds to all 5 digits of right foot. Dermatologic: Below-knee cast clean, dry, and intact to right lower leg. Nails 1-5 bilaterally are normotrophic. Webspaces 1-4 bilaterally are clean, dry and intact. Neurovascular:  Light touch sensation grossly intact in the saphenous, deep peroneal, superficial peroneal, sural, and tibial nerve distributions bilaterally. Musculoskeletal: Muscle strength testing deferred due to presence of cast to right lower leg.     ASSESSMENT: Pt. is a 45 y.o. female with:  Principle  S/p ORIF of right ankle    Chronic  Patient Active Problem List   Diagnosis    Sigmoid diverticulitis    Hypertension    Hyperlipemia    GERD (gastroesophageal reflux disease)    S/P gastric bypass    Anxiety    Alcohol withdrawal (HCC)    Alcoholism ([address]    Amnesia    Cardiomyopathy ([address]    Depression    Mood disorder, drug-induced ([address]    Organic mood disorder    Wernicke-Korsakoff syndrome (HCC)    Moderate episode of recurrent major depressive disorder ([address]    Trimalleolar fracture of right ankle, closed, initial encounter    Falls, initial encounter       PLAN:   S/p ORIF of right ankle  -Patient examined and evaluated  -Has no new pedal complaints at this time  -Right lower leg below-knee cast is clean, dry, and intact  -Discussed with patient we will await her working with physical therapy and Occupational Therapy prior to making arrangements for discharge  -Discussed with patient we will try to place her to an ECF/SNF versus back to her assisted living facility due to increased care needs  -Patient verbalized understanding and agreement with treatment plan as stated. All of her questions were answered to her satisfaction.       Chantal Cruz DPM, PGY-2  Podiatric Surgical Resident  4/28/2022   3:05 PM

## 2022-04-28 NOTE — ANESTHESIA POSTPROCEDURE EVALUATION
Department of Anesthesiology  Postprocedure Note    Patient: Sergei Ayala  MRN: [id number]  YOB: 1970  Date of evaluation: 4/27/2022  Time:  8:16 PM     Procedure Summary     Date: 04/27/22 Room / Location: [address] [facility]    Anesthesia Start: 1626 Anesthesia Stop: 1853    Procedure: ORIF Trimalleolar Ankle Fracture Right (Right Ankle) Diagnosis: (Trimalleolar fx)    Surgeons: Maria Esther Guillen DPM Responsible Provider: Amanda Meza MD    Anesthesia Type: general ASA Status: 2          Anesthesia Type: general    Taima Phase I: Tamia Score: 9    Tamia Phase II:      Last vitals: Reviewed and per EMR flowsheets.        Anesthesia Post Evaluation    Patient location during evaluation: PACU  Patient participation: complete - patient participated  Level of consciousness: awake and alert  Airway patency: patent  Nausea & Vomiting: no nausea  Complications: no  Cardiovascular status: blood pressure returned to baseline and hemodynamically stable  Respiratory status: acceptable and spontaneous ventilation  Hydration status: euvolemic

## 2022-04-28 NOTE — CARE COORDINATION
4/28/22, 3:10 PM EDT    DISCHARGE ON GOING [address] day: 2  7K21  Procedure: 4/27  ORIF Trimalleolar Ankle Fracture Right by podiatry  Barriers to Discharge: POD #1, N/V checks, NWB to RLE, ice therapy, Hospitalist follows. IVF,   PCP: Harmony Otero MD  Readmission Risk Score: 10.7 ( )%  Patient Goals/Plan/Treatment Preferences: from [facility]; will need another ECF since her insurance is not accepted on skilled unit at this ECF.

## 2022-04-28 NOTE — PROGRESS NOTES
Britney Irby was evaluated today for a wheeled walker and she requires this to successfully complete daily living tasks of grooming, bathing, toileting and ambulation. A wheeled walker is necessary due to the patient's unsteady gait, upper & lower body weakness, and inability to  an ambulation device; and she can ambulate only by pushing a walker instead of a lesser assistive device such as a cane, crutch, or standard walker. The need for this equipment was discussed with the patient and she understands and is in agreement.     Latrice Martinez PT, DPT

## 2022-04-29 LAB
ALBUMIN SERPL-MCNC: 3.3 G/DL (ref 3.5–5.1)
ALP BLD-CCNC: 69 U/L (ref 38–126)
ALT SERPL-CCNC: 9 U/L (ref 11–66)
ANION GAP SERPL CALCULATED.3IONS-SCNC: 12 MEQ/L (ref 8–16)
AST SERPL-CCNC: 23 U/L (ref 5–40)
BASOPHILS # BLD: 0.5 %
BASOPHILS ABSOLUTE: 0.1 THOU/MM3 (ref 0–0.1)
BILIRUB SERPL-MCNC: 0.6 MG/DL (ref 0.3–1.2)
BUN BLDV-MCNC: 7 MG/DL (ref 7–22)
CALCIUM SERPL-MCNC: 8.5 MG/DL (ref 8.5–10.5)
CHLORIDE BLD-SCNC: 107 MEQ/L (ref 98–111)
CO2: 24 MEQ/L (ref 23–33)
CREAT SERPL-MCNC: 0.7 MG/DL (ref 0.4–1.2)
EOSINOPHIL # BLD: 0.8 %
EOSINOPHILS ABSOLUTE: 0.1 THOU/MM3 (ref 0–0.4)
ERYTHROCYTE [DISTWIDTH] IN BLOOD BY AUTOMATED COUNT: 12.8 % (ref 11.5–14.5)
ERYTHROCYTE [DISTWIDTH] IN BLOOD BY AUTOMATED COUNT: 42.6 FL (ref 35–45)
GFR SERPL CREATININE-BSD FRML MDRD: 88 ML/MIN/1.73M2
GLUCOSE BLD-MCNC: 93 MG/DL (ref 70–108)
HCT VFR BLD CALC: 36.9 % (ref 37–47)
HEMOGLOBIN: 11.6 GM/DL (ref 12–16)
IMMATURE GRANS (ABS): 0.03 THOU/MM3 (ref 0–0.07)
IMMATURE GRANULOCYTES: 0.3 %
LYMPHOCYTES # BLD: 38.3 %
LYMPHOCYTES ABSOLUTE: 4 THOU/MM3 (ref 1–4.8)
MCH RBC QN AUTO: 28.6 PG (ref 26–33)
MCHC RBC AUTO-ENTMCNC: 31.4 GM/DL (ref 32.2–35.5)
MCV RBC AUTO: 91.1 FL (ref 81–99)
MONOCYTES # BLD: 7.1 %
MONOCYTES ABSOLUTE: 0.7 THOU/MM3 (ref 0.4–1.3)
NUCLEATED RED BLOOD CELLS: 0 /100 WBC
PLATELET # BLD: 208 THOU/MM3 (ref 130–400)
PMV BLD AUTO: 11.7 FL (ref 9.4–12.4)
POTASSIUM REFLEX MAGNESIUM: 4.2 MEQ/L (ref 3.5–5.2)
RBC # BLD: 4.05 MILL/MM3 (ref 4.2–5.4)
SEG NEUTROPHILS: 53 %
SEGMENTED NEUTROPHILS ABSOLUTE COUNT: 5.6 THOU/MM3 (ref 1.8–7.7)
SODIUM BLD-SCNC: 143 MEQ/L (ref 135–145)
TOTAL PROTEIN: 6 G/DL (ref 6.1–8)
WBC # BLD: 10.5 THOU/MM3 (ref 4.8–10.8)

## 2022-04-29 PROCEDURE — 36415 COLL VENOUS BLD VENIPUNCTURE: CPT

## 2022-04-29 PROCEDURE — 85025 COMPLETE CBC W/AUTO DIFF WBC: CPT

## 2022-04-29 PROCEDURE — 6370000000 HC RX 637 (ALT 250 FOR IP)

## 2022-04-29 PROCEDURE — 97110 THERAPEUTIC EXERCISES: CPT

## 2022-04-29 PROCEDURE — 80053 COMPREHEN METABOLIC PANEL: CPT

## 2022-04-29 PROCEDURE — 6370000000 HC RX 637 (ALT 250 FOR IP): Performed by: STUDENT IN AN ORGANIZED HEALTH CARE EDUCATION/TRAINING PROGRAM

## 2022-04-29 PROCEDURE — 97116 GAIT TRAINING THERAPY: CPT

## 2022-04-29 PROCEDURE — 6370000000 HC RX 637 (ALT 250 FOR IP): Performed by: PHYSICIAN ASSISTANT

## 2022-04-29 PROCEDURE — 1200000000 HC SEMI PRIVATE

## 2022-04-29 PROCEDURE — 2580000003 HC RX 258: Performed by: PHYSICIAN ASSISTANT

## 2022-04-29 PROCEDURE — 97535 SELF CARE MNGMENT TRAINING: CPT

## 2022-04-29 PROCEDURE — 99232 SBSQ HOSP IP/OBS MODERATE 35: CPT | Performed by: PHYSICIAN ASSISTANT

## 2022-04-29 PROCEDURE — 97530 THERAPEUTIC ACTIVITIES: CPT

## 2022-04-29 PROCEDURE — 94760 N-INVAS EAR/PLS OXIMETRY 1: CPT

## 2022-04-29 RX ORDER — RIVAROXABAN 10 MG/1
10 TABLET, FILM COATED ORAL
Qty: 30 TABLET | Refills: 0 | Status: SHIPPED | OUTPATIENT
Start: 2022-04-29 | End: 2022-05-02 | Stop reason: SDUPTHER

## 2022-04-29 RX ADMIN — OXYCODONE HYDROCHLORIDE AND ACETAMINOPHEN 2 TABLET: 5; 325 TABLET ORAL at 03:26

## 2022-04-29 RX ADMIN — FLUOXETINE 20 MG: 20 CAPSULE ORAL at 09:22

## 2022-04-29 RX ADMIN — QUETIAPINE FUMARATE 50 MG: 25 TABLET ORAL at 20:35

## 2022-04-29 RX ADMIN — METOPROLOL SUCCINATE 50 MG: 50 TABLET, EXTENDED RELEASE ORAL at 09:21

## 2022-04-29 RX ADMIN — FAMOTIDINE 20 MG: 20 TABLET ORAL at 09:25

## 2022-04-29 RX ADMIN — SPIRONOLACTONE 12.5 MG: 25 TABLET ORAL at 09:21

## 2022-04-29 RX ADMIN — OXYCODONE HYDROCHLORIDE AND ACETAMINOPHEN 2 TABLET: 5; 325 TABLET ORAL at 12:26

## 2022-04-29 RX ADMIN — FAMOTIDINE 20 MG: 20 TABLET ORAL at 20:35

## 2022-04-29 RX ADMIN — OXYCODONE HYDROCHLORIDE AND ACETAMINOPHEN 500 MG: 500 TABLET ORAL at 09:21

## 2022-04-29 RX ADMIN — POLYETHYLENE GLYCOL 3350 17 G: 17 POWDER, FOR SOLUTION ORAL at 09:25

## 2022-04-29 RX ADMIN — Medication 100 MG: at 09:21

## 2022-04-29 RX ADMIN — OXYCODONE HYDROCHLORIDE AND ACETAMINOPHEN 2 TABLET: 5; 325 TABLET ORAL at 20:35

## 2022-04-29 RX ADMIN — SODIUM CHLORIDE, PRESERVATIVE FREE 30 ML: 5 INJECTION INTRAVENOUS at 10:38

## 2022-04-29 ASSESSMENT — PAIN SCALES - GENERAL
PAINLEVEL_OUTOF10: 10
PAINLEVEL_OUTOF10: 0
PAINLEVEL_OUTOF10: 7
PAINLEVEL_OUTOF10: 4
PAINLEVEL_OUTOF10: 10
PAINLEVEL_OUTOF10: 0
PAINLEVEL_OUTOF10: 7

## 2022-04-29 ASSESSMENT — PAIN DESCRIPTION - LOCATION
LOCATION: ANKLE
LOCATION: LEG

## 2022-04-29 ASSESSMENT — PAIN DESCRIPTION - ORIENTATION
ORIENTATION: RIGHT
ORIENTATION: RIGHT

## 2022-04-29 ASSESSMENT — PAIN SCALES - WONG BAKER: WONGBAKER_NUMERICALRESPONSE: 0

## 2022-04-29 NOTE — CARE COORDINATION
4/29/22, 9:01 AM EDT    DISCHARGE  [address] day: 3  Location: -21/021-A Reason for admit: Trimalleolar fracture of right ankle, closed, initial encounter [S82.850B]   Procedure: 4/27  ORIF Trimalleolar Ankle Fracture Right by podiatry  Barriers to Discharge: POD #2, PT/OT to see this am.    Patient Goals/Plan/Treatment Preferences: spoke with Venessa Sahni; she would prefer to go back to AL at [facility] if able. Ordered a RW as requested by therapy. 0900 am- Jessica Contreras patient mother at request of nurse Anselmo Carson. She lives in [place] and will not be able transpot pt back to [address] [facility]. She wants called again later today with discharge update from care coord.  / RN. Podiatry will see pt after 12 noon and decide on discharge. RW delivered. SW assisting with [place] and transportation. 1555 pm- call received from [facility]; she shared they do not have staffing after 7PM to ensure the pt is safe and concern is she is Non weight bearing to RLE plus ha hx of patient short-term memory. Explained to George Ledesma our therapy felt she was able to return with RW and CGA with transfers. Explained to patient she will not be able to return to AL due to safety issue. Anders RN will up Wilver Farnsworth pt mother this shift.

## 2022-04-29 NOTE — PLAN OF CARE
Problem: Discharge Planning  Goal: Discharge to home or other facility with appropriate resources  Outcome: Progressing  Flowsheets (Taken 4/29/2022 1414)  Discharge to home or other facility with appropriate resources: Arrange for needed discharge resources and transportation as appropriate  Note: Patient to have [facility] upon discharge to [address] [place] [address]. Problem: Pain  Goal: Verbalizes/displays adequate comfort level or baseline comfort level  Outcome: Progressing  Flowsheets (Taken 4/29/2022 1414)  Verbalizes/displays adequate comfort level or baseline comfort level:   Assess pain using appropriate pain scale   Encourage patient to monitor pain and request assistance   Notify Licensed Independent Practitioner if interventions unsuccessful or patient reports new pain  Note: Patient enc. To take pain med, facial grimacing noted and patient does state pain at 10 when sitting in chair. Percocet x2 effective. Circ. Checks good to right toes. Problem: Safety - Adult  Goal: Free from fall injury  Outcome: Progressing  Flowsheets (Taken 4/29/2022 1414)  Free From Fall Injury: Instruct family/caregiver on patient safety     Problem: ABCDS Injury Assessment  Goal: Absence of physical injury  Outcome: Progressing     Problem: Gastrointestinal - Adult  Goal: Minimal or absence of nausea and vomiting  Outcome: Progressing     Problem: Genitourinary - Adult  Goal: Absence of urinary retention  Outcome: Progressing     Problem: Metabolic/Fluid and Electrolytes - Adult  Goal: Electrolytes maintained within normal limits  Outcome: Progressing     Problem: Skin/Tissue Integrity - Adult  Goal: Skin integrity remains intact  Outcome: Progressing     Problem: Musculoskeletal - Adult  Goal: Return mobility to safest level of function  Outcome: Progressing Care plan reviewed with patient and Patient and does verbalize understanding of the plan of care and contribute to goal setting.

## 2022-04-29 NOTE — DISCHARGE INSTR - COC
Continuity of Care Form    Patient Name: Annmarie Butler   :  1970  MRN:  [id number]    Admit date:  2022  Discharge date:  ***    Code Status Order: Full Code   Advance Directives:      Admitting Physician:  Casandra Wilkins MD  PCP: Vivienne Holguin MD    Discharging Nurse:Javier Vangła 57 Unit/Room#: 7K-21/021-A  Discharging Unit Phone Number: [phone]    Emergency Contact:   Extended Emergency Contact Information  Primary Emergency Contact: Prachi Friedman  Home Phone: [phone]  Mobile Phone: [phone]  Relation: Parent    Past Surgical History:  Past Surgical History:   Procedure Laterality Date    ANKLE FRACTURE SURGERY Right 2022    ORIF Trimalleolar Ankle Fracture Right performed by Karina Alexis DPM at [facility]      age 15    BARIATRIC SURGERY  2015    COLONOSCOPY  2010    DILATION AND CURETTAGE      KNEE ARTHROSCOPY Right     OVARY REMOVAL Right 2009    cyst    SINUS SURGERY      TUBAL LIGATION Bilateral 2009    WISDOM TOOTH EXTRACTION         Immunization History:   Immunization History   Administered Date(s) Administered    Influenza Vaccine, unspecified formulation 10/14/2016    Influenza Whole 2015    Pneumococcal Polysaccharide ([id number]) 2017       Active Problems:  Patient Active Problem List   Diagnosis Code    Sigmoid diverticulitis K57.32    Hypertension I10    Hyperlipemia E78.5    GERD (gastroesophageal reflux disease) K21.9    S/P gastric bypass Z98.84    Anxiety F41.9    Alcohol withdrawal ([address] F10.239    Alcoholism ([address] F10.20    Amnesia R41.3    Cardiomyopathy ([address] I42.9    Depression F32. 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 Bebe Cobb Isolation/Infection:   Isolation            No Isolation          Patient Infection Status       None to display            Nurse Assessment:  Last Vital Signs: BP (!) 102/56   Pulse 69   Temp 98 °F (36.7 °C) (Oral)   Resp 18   Ht 5' 8\" (1.727 m)   Wt 192 lb (87.1 kg)   LMP 05/01/2016   SpO2 97%   BMI 29.19 kg/m²     Last documented pain score (0-10 scale): Pain Level: 10  Last Weight:   Wt Readings from Last 1 Encounters:   04/29/22 192 lb (87.1 kg)     Mental Status:  Alert and oriented forgetful at times    IV Access:  [address] ELISHA IV ACCESS:584810155}    Nursing Mobility/ADLs:  Walking   {P DME GLENIS:287037092}  Transfer  {[facility] DME OLUX:278824863}  Bathing  {P DME COIH:630491238}  Dressing  {P DME NQQQ:756344255}  Toileting  {P DME RBEN:241374504}  Feeding  {[facility] DME SRGY:424394944}  Med Admin  {[facility] DME XUFE:820876924}  Med Delivery   { ELISHA MED Delivery:052503598}    Wound Care Documentation and Therapy:  Incision 04/27/22 Pretibial Distal;Right (Active)   Dressing Status Clean; Intact;Dry 04/29/22 1111   Incision Cleansed Cleansed with saline 04/27/22 1837   Dressing/Treatment Cast 04/29/22 1111   Closure Sutures 04/27/22 1919   Margins Approximated 04/27/22 1837   Drainage Amount None 04/29/22 0823   Odor None 04/29/22 0823   Number of days: 1       Incision 04/27/22 Ankle Anterior;Right; Inner (Active)   Dressing Status Intact;Dry;Clean 04/29/22 1111   Incision Cleansed Cleansed with saline 04/27/22 1837   Dressing/Treatment Cast 04/28/22 0829   Closure Sutures 04/27/22 1919   Margins Approximated 04/27/22 1837   Drainage Amount None 04/29/22 1111   Odor None 04/29/22 1111   Number of days: 1        Elimination:  Continence:    Bowel: {YES / WY:92223}  Bladder: {YES / RL:38769}  Urinary Catheter: {Urinary Catheter:913970854}   Colostomy/Ileostomy/Ileal Conduit: {YES / AM:56374}       Date of Last BM: ***    Intake/Output Summary (Last 24 hours) at 4/29/2022 1500  Last data filed at 4/29/2022 1414  Gross per 24 hour   Intake 830 ml   Output --   Net 830 ml     I/O last 3 completed shifts:   In: 750 [P.O.:750]  Out: -     Safety Concerns:     [address] Safety Concerns:860607461}    Impairments/Disabilities:      [address] Impairments/Disabilities:859528802}    Nutrition Therapy:  Current Nutrition Therapy:   [address] TE[address] Diet List:901754718}    Routes of Feeding: {CHP DME Other Feedings:698805237}  Liquids: {Slp liquid thickness:59623}  Daily Fluid Restriction: {CHP DME Yes amt example:891089859}  Last Modified Barium Swallow with Video (Video Swallowing Test): {Done Not Done YVFV:803789367}    Treatments at the Time of Hospital Discharge:   Respiratory Treatments: ***  Oxygen Therapy:  {Therapy; copd oxygen:95852}  Ventilator:    { CC Vent RLMA:294226517}    Rehab Therapies: {THERAPEUTIC INTERVENTION:8564918281}  Weight Bearing Status/Restrictions: [address]  Weight Bearin}  Other Medical Equipment (for information only, NOT a DME order):  {EQUIPMENT:464345181}  Other Treatments: ***    Patient's personal belongings (please select all that are sent with patient):  {[facility] DME Belongings:904079618}    RN SIGNATURE:  {Esignature:747736019}    CASE MANAGEMENT/SOCIAL WORK SECTION    Inpatient Status Date: ***    Readmission Risk Assessment Score:  Readmission Risk              Risk of Unplanned Readmission:  20           Discharging to Facility/ Agency   Name:   Address:  Phone:  Fax:    Dialysis Facility (if applicable)   Name:  Address:  Dialysis Schedule:  Phone:  Fax:    / signature: {Esignature:439612726}    PHYSICIAN SECTION    Prognosis: {Prognosis:5315777553}    Condition at Discharge: [address] Patient Condition:199455675}    Rehab Potential (if transferring to Rehab): {Prognosis:2160061803}    Recommended Labs or Other Treatments After Discharge: ***    Physician Certification: I certify the above information and transfer of Lahoma Hatchet  is necessary for the continuing treatment of the diagnosis listed and that she requires {Admit to Appropriate Level of Care:00685} for {GREATER/LESS:300951773} 30 days.      Update Admission H&P: {CHP DME Changes in SMSXU:457050972}    PHYSICIAN SIGNATURE:  {Esignature:011966469} Dupixent Pregnancy And Lactation Text: This medication likely crosses the placenta but the risk for the fetus is uncertain. This medication is excreted in breast milk.

## 2022-04-29 NOTE — PROGRESS NOTES
Hospitalist Progress Note      Patient:  Kamar Spear    Unit/Bed:7K-21/021-A  YOB: 1970  MRN: [id number]   Acct: [de-identified]   PCP: Elisabeth Correa MD  Date of Admission: 4/26/2022    Assessment/Plan:    1. Right trimalleolar fracture: Postop day 2. Podiatry primary. Plan for home health at assisted living. 2. Essential hypertension: Blood pressure relatively stable. Continue home medications. 3. Atrial fibrillation, paroxysmal: Diagnosed 2018. EKG shows normal sinus rhythm. Continue beta-blocker. 4. Major depressive disorder: Continue home medications. 5. History of Warnicke's Korsakoff syndrome: Continue thiamine. Hospitalist team will sign off at this time. Please feel free to contact us with any questions or concerns. Chief Complaint: Right ankle pain     Subjective (past 24 hours):   No acute events overnight. Pt states that her pain is relatively controlled. Pt has no issues or concerns at this time. Past medical history, family history, social history and allergies reviewed again and is unchanged since admission. ROS (All review of systems completed. Pertinent positives noted.  Otherwise All other systems reviewed and negative.)     Medications:  Reviewed    Infusion Medications    sodium chloride       Scheduled Medications    famotidine  20 mg Oral BID    ascorbic acid  500 mg Oral Daily    FLUoxetine  20 mg Oral Daily    metoprolol succinate  50 mg Oral Daily    QUEtiapine  50 mg Oral Nightly    spironolactone  12.5 mg Oral Daily    thiamine  100 mg Oral Daily    sodium chloride flush  5-40 mL IntraVENous 2 times per day    polyethylene glycol  17 g Oral Daily    bisacodyl  10 mg Rectal Daily     PRN Meds: oxyCODONE-acetaminophen **OR** oxyCODONE-acetaminophen, sodium chloride flush, sodium chloride, acetaminophen, ondansetron **OR** ondansetron, senna, fentanNYL **OR** fentanNYL      Intake/Output Summary (Last 24 hours) at 4/29/2022 1239  Last data filed at 4/29/2022 1038  Gross per 24 hour   Intake 830 ml   Output    Net 830 ml       Diet:  ADULT DIET; Regular    Exam:  BP (!) 102/56   Pulse 69   Temp 98 °F (36.7 °C) (Oral)   Resp 18   Ht 5' 8\" (1.727 m)   Wt 192 lb (87.1 kg)   LMP 05/01/2016   SpO2 97%   BMI 29.19 kg/m²   General appearance: No apparent distress, appears stated age and cooperative. HEENT: Pupils equal, round, and reactive to light. Conjunctivae/corneas clear. Neck: Supple, with full range of motion. No jugular venous distention. Trachea midline. Respiratory:  Normal respiratory effort. Clear to auscultation, bilaterally without Rales/Wheezes/Rhonchi. Cardiovascular: Regular rate and rhythm with normal S1/S2 without murmurs, rubs or gallops. Abdomen: Soft, non-tender, non-distended with normal bowel sounds. Musculoskeletal: passive and active ROM x 4 extremities. LLE bandage c/d/i  Skin: Skin color, texture, turgor normal.  No rashes or lesions. Neurologic:  Neurovascularly intact without any focal sensory/motor deficits.  Cranial nerves: II-XII intact, grossly non-focal.  Psychiatric: Alert and oriented, thought content appropriate, normal insight  Capillary Refill: Brisk,< 3 seconds   Peripheral Pulses: +2 palpable, equal bilaterally     Labs:   Recent Labs     04/27/22  0519 04/28/22  0535 04/29/22  0633   WBC 9.9 9.6 10.5   HGB 12.4 11.8* 11.6*   HCT 38.7 36.4* 36.9*    227 208     Recent Labs     04/27/22  0519 04/28/22  0535 04/29/22  0633    138 143   K 3.9 4.2 4.2    104 107   CO2 22* 20* 24   BUN 10 7 7   CREATININE 0.7 0.6 0.7   CALCIUM 8.7 8.6 8.5     Recent Labs     04/27/22  0519 04/28/22  0535 04/29/22  0633   AST 15 17 23   ALT 9* 8* 9*   BILITOT 0.5 0.4 0.6   ALKPHOS 74 72 69     Urinalysis:      Lab Results   Component Value Date    NITRU NEGATIVE 06/23/2014    WBCUA None 06/23/2014    BACTERIA 3+ 06/23/2014    RBCUA 0 TO 4 06/23/2014 SPECGRAV 1.005 06/23/2014    GLUCOSEU NEGATIVE 06/23/2014       Radiology:  XR ANKLE RIGHT (2 VIEWS)   Final Result   1. Interval placement of internal fixation hardware bridging the    anatomically aligned fractures of the distal tibia and fibula without    evidence of acute complication. This document has been electronically signed by: Kell Karimi MD on    04/27/2022 09:28 PM      FLUORO FOR SURGICAL PROCEDURES   Final Result      XR ANKLE RIGHT (2 VIEWS)   Final Result   1. Stable alignment of trimalleolar fracture of the right ankle, narrowing    cast.      This document has been electronically signed by: Kell Karimi MD on    04/26/2022 06:40 PM      XR ANKLE RIGHT (2 VIEWS)   Final Result   1. Improved alignment of minimally displaced trimalleolar fractures of the    right ankle. 2. Diffuse soft tissue edema overlying the right ankle. This document has been electronically signed by: Kell Karimi MD on    04/26/2022 06:53 PM      CT ANKLE RIGHT WO CONTRAST   Final Result   Trimalleolar fracture subluxation. **This report has been created using voice recognition software. It may contain minor errors which are inherent in voice recognition technology. **      Final report electronically signed by Dr. Miya Bowens on 4/26/2022 4:53 PM      XR ANKLE RIGHT (MIN 3 VIEWS)   Final Result   Fracture/subluxation of the ankle involving at least the lateral and medial malleoli. As mentioned above, cannot definitely exclude an associated posterior malleolar fracture. CT of the ankle would be helpful for further evaluation. **This report has been created using voice recognition software. It may contain minor errors which are inherent in voice recognition technology. **      Final report electronically signed by Dr. Miya Bowens on 4/26/2022 3:14 PM        Electronically signed by BHASKAR Whyte on 4/29/2022 at 12:39 PM

## 2022-04-29 NOTE — CARE COORDINATION
4/29/22, 10:43 AM EDT    DISCHARGE PLANNING EVALUATION    Follow up visit with pt to confirm discharge POC. Pt plans on returning to [address] Discussed home health for PT/OT. Pt would like home health and requested [facility]. Referral completed with Paige Dooley with [facility].    4/29/22, 10:44 AM EDT    Patient goals/plan/ treatment preferences discussed by  and . Patient goals/plan/ treatment preferences reviewed with patient/ family. Patient/ family verbalize understanding of discharge plan and are in agreement with goal/plan/treatment preferences. Understanding was demonstrated using the teach back method. AVS provided by RN at time of discharge, which includes all necessary medical information pertaining to the patients current course of illness, treatment, post-discharge goals of care, and treatment preferences. Services At/After Discharge: Home Health     Pt is being discharged back to [facility] today. [facility] has been updated. [facility] updated Assisted Living. [facility] for transportation at 4-4:30 pm.  MARTIN faxed AVS.  RN is aware. 1615 pm-  Called and cancelled [facility] and [facility] transportation.

## 2022-04-29 NOTE — DISCHARGE SUMMARY
Discharge Summary    Date: 4/29/2022  Patient Name: Yvonne Velazquez YOB: 1970 Age: 45 y.o. Admit Date: 4/26/2022  Discharge Date: 4/29/2022  Discharge Condition: Good    Admission Diagnosis  Trimalleolar fracture of right ankle, closed, initial encounter (P67.697O)     Discharge Diagnosis  Principal Problem: Trimalleolar fracture of right ankle, closed, initial encounterActive Problems: Falls, initial encounter Wernicke-Korsakoff syndrome (HCC)Resolved Problems:  * No resolved hospital problems. [facility] Stay  Narrative of Hospital Course: The patient presented to the ED on 4/26, after sustaining a fall at a nursing facility. The patient had her right ankle closed reduced and splinted in the ED. The patient underwent surgical intervention on 4/27 consisting of a ORIF Trimalleolar Ankle Fracture. The patient had PT and OT on 4/28 and 4/29 for evaluation and treatment. The patient is discharging back to the assisted living facility with [facility] PT and OT. Consultants:  IP CONSULT TO PODIATRYIP CONSULT TO SOCIAL WORKIP CONSULT TO [facility] TO CASE MANAGEMENTIP CONSULT TO HOME CARE NEEDS    Surgeries/procedures Performed:  ORIF Trimalleolar Ankle Fracture Right     Treatments:    Analgesia, Surgery and Therapies    Morphine, PT and OT    Discharge Plan/Disposition:  To [facility]    Hospital/Incidental Findings Requiring Follow Up:    Patient Instructions:    Diet: Regular Diet    Activity:  For number of days (if applicable): Other Instructions: The patient is to remain non weightbearing to the right lower extremity. The patient is to keep the cast clean, dry and intact. The patient is to utilize a walker for ambulation. The patient is to elevate the right lower extremity. The patient is to take Xarelto daily. The patient is to follow up with Dr. Nigel Stark in clinic. Provider Follow-Up:   No follow-ups on file.      Significant Diagnostic Studies:    Recent Labs:  Admission on 04/26/2022Ventricular Rate                              Date: 04/26/2022Value: 54          Ref range: BPM                Status: FinalAtrial Rate                                   Date: 04/26/2022Value: 54          Ref range: BPM                Status: FinalP-R Interval                                  Date: 04/26/2022Value: 156         Ref range: ms                 Status: FinalQRS Duration                                  Date: 04/26/2022Value: 86          Ref range: ms                 Status: FinalQ-T Interval                                  Date: 04/26/2022Value: 458         Ref range: ms                 Status: FinalQTc Calculation (Bazett)                      Date: 04/26/2022Value: 434         Ref range: ms                 Status: FinalP Axis                                        Date: 04/26/2022Value: 38          Ref range: degrees            Status: FinalR Axis                                        Date: 04/26/2022Value: 27          Ref range: degrees            Status: FinalT Axis                                        Date: 04/26/2022Value: 35          Ref range: degrees            Status: [address] [facility]                                           Date: 04/26/2022Value: 11.9*       Ref range: 4.8 - 10.8 thou/*  Status: FinalRBC                                           Date: 04/26/2022Value: 4.67        Ref range: 4.20 - 5.40 mill*  Status: FinalHemoglobin                                    Date: 04/26/2022Value: 13.4        Ref range: 12.0 - 16.0 gm/dl  Status: FinalHematocrit                                    Date: 04/26/2022Value: 42.8        Ref range: 37.0 - 47.0 %      Status: FinalMCV                                           Date: 04/26/2022Value: 91.6        Ref range: 81.0 - 99.0 fL     Status: [address] [place]                                           Date: 04/26/2022Value: 28.7        Ref range: 26.0 - 33.0 pg     Status: [address]                                          Date: 04/26/2022Value: 31.3*       Ref range: 32.2 - 35.5 gm/dl  Status: FinalRDW-CV                                        Date: 04/26/2022Value: 13.0        Ref range: 11.5 - 14.5 %      Status: FinalRDW-SD                                        Date: 04/26/2022Value: 43.3        Ref range: 35.0 - 45.0 fL     Status: FinalPlatelets                                     Date: 04/26/2022Value: 240         Ref range: 130 - 400 thou/m*  Status: FinalMPV                                           Date: 04/26/2022Value: 11.6        Ref range: 9.4 - 12.4 fL      Status: FinalSeg Neutrophils                               Date: 04/26/2022Value: 70.1        Ref range: %                  Status: FinalLymphocytes                                   Date: 04/26/2022Value: 23.1        Ref range: %                  Status: FinalMonocytes                                     Date: 04/26/2022Value: 5.4         Ref range: %                  Status: FinalEosinophils                                   Date: 04/26/2022Value: 0.8         Ref range: %                  Status: FinalBasophils                                     Date: 04/26/2022Value: 0.3         Ref range: %                  Status: FinalImmature Granulocytes                         Date: 04/26/2022Value: 0.3         Ref range: %                  Status: FinalSegs Absolute                                 Date: 04/26/2022Value: 8.3*        Ref range: 1.8 - 7.7 thou/m*  Status: FinalLymphocytes Absolute                          Date: 04/26/2022Value: 2.7         Ref range: 1.0 - 4.8 thou/m*  Status: FinalMonocytes Absolute                            Date: 04/26/2022Value: 0.6         Ref range: 0.4 - 1.3 thou/m*  Status: FinalEosinophils Absolute                          Date: 04/26/2022Value: 0.1         Ref range: 0.0 - 0.4 thou/m*  Status: FinalBasophils Absolute                            Date: 04/26/2022Value: 0.0         Ref range: 0.0 - 0.1 thou/m*  Status: Nilam Ang (Abs)                          Date: 04/26/2022Value: 0.04        Ref range: 0.00 - 0.07 thou*  Status: FinalnRBC                                          Date: 04/26/2022Value: 0           Ref range: /100 wbc           Status: Final              Comment: Performed at [address] [place] [address] [id number]                                    Date: 04/26/2022Value: < 0.010     Ref range: ng/ml              Status: Final              Comment: <0.010 ng/ml        Normal> or = 0.010 ng/ml  Elevated   (99%)                    Consistent with myocardial damageCardiac troponin values can be elevated by many disease states in additionto acute ischemia. These include, but are not limited to: chronic renalfailure, CHF, CVA, pulmonary embolus, COPD, myocardial trauma/surgery,myocarditis, pericarditis, tachycardia, aortic dissection, amyloidosis,sepsis and strenuous exercise. Serial measurement of troponin is stronglyrecommended as a first step in determining whether a low level elevationrepresents an acute or chronic condition. Performed at [address] [place] [address] [id number]                                       Date: 04/26/2022Value: 106         Ref range: 70 - 108 mg/dL     Status: FinalCREATININE                                    Date: 04/26/2022Value: 0.7         Ref range: 0.4 - 1.2 mg/dL    Status: FinalBUN                                           Date: 04/26/2022Value: 14          Ref range: 7 - 22 mg/dL       Status: FinalSodium                                        Date: 04/26/2022Value: 140         Ref range: 135 - 145 meq/L    Status: FinalPotassium reflex Magnesium                    Date: 04/26/2022Value: 4.8         Ref range: 3.5 - 5.2 meq/L    Status: Final              Comment: Low level specimen hemolysis is present as indicated by the interferencelevel index on the Roche analyzer. The reported K+ level may be falselyincreased.   If clinically warranted, recollection of the specimen issuggested. Chloride                                      Date: 04/26/2022Value: 106         Ref range: 98 - 111 meq/L     Status: FinalCO2                                           Date: 04/26/2022Value: 25          Ref range: 23 - 33 meq/L      Status: FinalCalcium                                       Date: 04/26/2022Value: 9.2         Ref range: 8.5 - 10.5 mg/dL   Status: FinalAST                                           Date: 04/26/2022Value: 19          Ref range: 5 - 40 U/L         Status: FinalAlkaline Phosphatase                          Date: 04/26/2022Value: 77          Ref range: 38 - 126 U/L       Status: FinalTotal Protein                                 Date: 04/26/2022Value: 7.0         Ref range: 6.1 - 8.0 g/dL     Status: FinalAlbumin                                       Date: 04/26/2022Value: 3.9         Ref range: 3.5 - 5.1 g/dL     Status: FinalTotal Bilirubin                               Date: 04/26/2022Value: 0.2*        Ref range: 0.3 - 1.2 mg/dL    Status: FinalALT                                           Date: 04/26/2022Value: 9*          Ref range: 11 - 66 U/L        Status: Final              Comment: Performed at [address] [place] [address], [address]                                       Date: 04/26/2022Value: 133.5       Ref range: 0.0 - 900.0 pg/mL  Status: Final              Comment:   Values < 300 pg/ml \"rule out\", or make the probability of heart failure highly unlikely. Values which \"rule in\" heart failue are as follows:        AGE                  RANGE       <50 years            >450 pg/ml       50-75 years          >900 pg/ml       >75 years            >1800 pg/mlPerformed at [address] [place] [address] [address] [place]                                     Date: 04/26/2022Value: 9.0         Ref range: 8.0 - 16.0 meq/L   Status: Final              Comment: ANION GAP = Sodium -(Chloride + CO2)Performed at [facility] [address] [facility], [address]                               Date: 04/26/2022Value: 280.3       Ref range: 275.0 - 300.0 mO*  Status: Final              Comment: Performed at [address] [place] [address], [address] [place] [address], [facility] Filt Rate                           Date: 04/26/2022Value: 80*         Ref range: ml/min/1.73m2      Status: Final              Comment: Stage Description                    GFR, ml/min/1.73 m2 -   At increased risk               > or = 60 (with chronic                                     kidney disease risk factors) 1   Normal or increased GFR         > or = 90 2   Mildly or decreased GFR         60 - 89 3   Moderately decreased GFR        30 - 59 4   Severely decreased GFR          15 - 29 5   Kidney failure                  <15 (or dialysis)Estimated GFR calculated using abbreviated MDRD formula asrecommended by [employer]. Calculation basedupon serum creatinine and adjusted for age, gender & race. Zulema. Internal Med., Vol. 139 (2) pg 137-147. Performed at [address] [place] [address] [id number]                                       Date: 04/27/2022Value: 80          Ref range: 70 - 108 mg/dL     Status: FinalCREATININE                                    Date: 04/27/2022Value: 0.7         Ref range: 0.4 - 1.2 mg/dL    Status: FinalBUN                                           Date: 04/27/2022Value: 10          Ref range: 7 - 22 mg/dL       Status: FinalSodium                                        Date: 04/27/2022Value: 139         Ref range: 135 - 145 meq/L    Status: FinalPotassium reflex Magnesium                    Date: 04/27/2022Value: 3.9         Ref range: 3.5 - 5.2 meq/L    Status: FinalChloride                                      Date: 04/27/2022Value: 106         Ref range: 98 - 111 meq/L     Status: FinalCO2                                           Date: 04/27/2022Value: 22*         Ref range: 23 - 33 meq/L      Status: FinalCalcium                                       Date: 04/27/2022Value: 8.7         Ref range: 8.5 - 10.5 mg/dL   Status: FinalAST                                           Date: 04/27/2022Value: 15          Ref range: 5 - 40 U/L         Status: FinalAlkaline Phosphatase                          Date: 04/27/2022Value: 74          Ref range: 38 - 126 U/L       Status: FinalTotal Protein                                 Date: 04/27/2022Value: 6.4         Ref range: 6.1 - 8.0 g/dL     Status: FinalAlbumin                                       Date: 04/27/2022Value: 3.7         Ref range: 3.5 - 5.1 g/dL     Status: FinalTotal Bilirubin                               Date: 04/27/2022Value: 0.5         Ref range: 0.3 - 1.2 mg/dL    Status: FinalALT                                           Date: 04/27/2022Value: 9*          Ref range: 11 - 66 U/L        Status: Final              Comment: Performed at [address] [place] [address], [address] [facility]                                           Date: 04/27/2022Value: 9.9         Ref range: 4.8 - 10.8 thou/*  Status: FinalRBC                                           Date: 04/27/2022Value: 4.35        Ref range: 4.20 - 5.40 mill*  Status: FinalHemoglobin                                    Date: 04/27/2022Value: 12.4        Ref range: 12.0 - 16.0 gm/dl  Status: FinalHematocrit                                    Date: 04/27/2022Value: 38.7        Ref range: 37.0 - 47.0 %      Status: FinalMCV                                           Date: 04/27/2022Value: 89.0        Ref range: 81.0 - 99.0 fL     Status: [address] [place]                                           Date: 04/27/2022Value: 28.5        Ref range: 26.0 - 33.0 pg     Status: [address]                                          Date: 04/27/2022Value: 32.0*       Ref range: 32.2 - 35.5 gm/dl  Status: FinalRDW-CV                                        Date: 04/27/2022Value: 12.7        Ref range: 11.5 - 14.5 %      Status: FinalRDW-SD                                        Date: 04/27/2022Value: 41.5        Ref range: 35.0 - 45.0 fL     Status: FinalPlatelets                                     Date: 04/27/2022Value: 247         Ref range: 130 - 400 thou/m*  Status: FinalMPV                                           Date: 04/27/2022Value: 11.2        Ref range: 9.4 - 12.4 fL      Status: FinalSeg Neutrophils                               Date: 04/27/2022Value: 61.1        Ref range: %                  Status: FinalLymphocytes                                   Date: 04/27/2022Value: 31.5        Ref range: %                  Status: FinalMonocytes                                     Date: 04/27/2022Value: 6.3         Ref range: %                  Status: FinalEosinophils                                   Date: 04/27/2022Value: 0.5         Ref range: %                  Status: FinalBasophils                                     Date: 04/27/2022Value: 0.3         Ref range: %                  Status: FinalImmature Granulocytes                         Date: 04/27/2022Value: 0.3         Ref range: %                  Status: FinalSegs Absolute                                 Date: 04/27/2022Value: 6.0         Ref range: 1.8 - 7.7 thou/m*  Status: FinalLymphocytes Absolute                          Date: 04/27/2022Value: 3.1         Ref range: 1.0 - 4.8 thou/m*  Status: FinalMonocytes Absolute                            Date: 04/27/2022Value: 0.6         Ref range: 0.4 - 1.3 thou/m*  Status: FinalEosinophils Absolute                          Date: 04/27/2022Value: 0.0         Ref range: 0.0 - 0.4 thou/m*  Status: FinalBasophils Absolute                            Date: 04/27/2022Value: 0.0         Ref range: 0.0 - 0.1 thou/m*  Status: FinalImmature Grans (Abs)                          Date: 04/27/2022Value: 0.03        Ref range: 0.00 - 0.07 thou*  Status: FinalnRBC Date: 04/27/2022Value: 0           Ref range: /100 wbc           Status: Final              Comment: Performed at [address] [place] [address], [address] [place]                                     Date: 04/27/2022Value: 11.0        Ref range: 8.0 - 16.0 meq/L   Status: Final              Comment: ANION GAP = Sodium -(Chloride + CO2)Performed at [address] [place] [address], [address], [facility] Filt Rate                           Date: 04/27/2022Value: 80*         Ref range: ml/min/1.73m2      Status: Final              Comment: Stage Description                    GFR, ml/min/1.73 m2 -   At increased risk               > or = 60 (with chronic                                     kidney disease risk factors) 1   Normal or increased GFR         > or = 90 2   Mildly or decreased GFR         60 - 89 3   Moderately decreased GFR        30 - 59 4   Severely decreased GFR          15 - 29 5   Kidney failure                  <15 (or dialysis)Estimated GFR calculated using abbreviated MDRD formula asrecommended by [employer]. Calculation basedupon serum creatinine and adjusted for age, gender & race. Zulema. Internal Med., Vol. 139 (2) pg 137-147. Performed at [address] [place] [id number] Rate                              Date: 04/27/2022Value: 60          Ref range: BPM                Status: FinalAtrial Rate                                   Date: 04/27/2022Value: 60          Ref range: BPM                Status: FinalP-R Interval                                  Date: 04/27/2022Value: 150         Ref range: ms                 Status: FinalQRS Duration                                  Date: 04/27/2022Value: 88          Ref range: ms                 Status: FinalQ-T Interval                                  Date: 04/27/2022Value: 440         Ref range: ms                 Status: FinalQTc Calculation (Badayanaratt)                      Date: 04/27/2022Value: 440         Ref range: ms                 Status: FinalP Axis                                        Date: 04/27/2022Value: 51          Ref range: degrees            Status: FinalR Axis                                        Date: 04/27/2022Value: 35          Ref range: degrees            Status: FinalT Axis                                        Date: 04/27/2022Value: 39          Ref range: degrees            Status: FinalGlucose                                       Date: 04/28/2022Value: 118*        Ref range: 70 - 108 mg/dL     Status: FinalCREATININE                                    Date: 04/28/2022Value: 0.6         Ref range: 0.4 - 1.2 mg/dL    Status: FinalBUN                                           Date: 04/28/2022Value: 7           Ref range: 7 - 22 mg/dL       Status: FinalSodium                                        Date: 04/28/2022Value: 138         Ref range: 135 - 145 meq/L    Status: FinalPotassium reflex Magnesium                    Date: 04/28/2022Value: 4.2         Ref range: 3.5 - 5.2 meq/L    Status: FinalChloride                                      Date: 04/28/2022Value: 104         Ref range: 98 - 111 meq/L     Status: FinalCO2                                           Date: 04/28/2022Value: 20*         Ref range: 23 - 33 meq/L      Status: FinalCalcium                                       Date: 04/28/2022Value: 8.6         Ref range: 8.5 - 10.5 mg/dL   Status: FinalAST                                           Date: 04/28/2022Value: 17          Ref range: 5 - 40 U/L         Status: FinalAlkaline Phosphatase                          Date: 04/28/2022Value: 72          Ref range: 38 - 126 U/L       Status: FinalTotal Protein                                 Date: 04/28/2022Value: 6.3         Ref range: 6.1 - 8.0 g/dL     Status: FinalAlbumin                                       Date: 04/28/2022Value: 3.6         Ref range: 3.5 - 5.1 g/dL     Status: FinalTotal Bilirubin Date: 04/28/2022Value: 0.4         Ref range: 0.3 - 1.2 mg/dL    Status: FinalALT                                           Date: 04/28/2022Value: 8*          Ref range: 11 - 66 U/L        Status: Final              Comment: Performed at [address] [place] [address], [address] [facility]                                           Date: 04/28/2022Value: 9.6         Ref range: 4.8 - 10.8 thou/*  Status: FinalRBC                                           Date: 04/28/2022Value: 4.11*       Ref range: 4.20 - 5.40 mill*  Status: FinalHemoglobin                                    Date: 04/28/2022Value: 11.8*       Ref range: 12.0 - 16.0 gm/dl  Status: FinalHematocrit                                    Date: 04/28/2022Value: 36.4*       Ref range: 37.0 - 47.0 %      Status: FinalMCV                                           Date: 04/28/2022Value: 88.6        Ref range: 81.0 - 99.0 fL     Status: [facility]                                           Date: 04/28/2022Value: 28.7        Ref range: 26.0 - 33.0 pg     Status: [address]                                          Date: 04/28/2022Value: 32.4        Ref range: 32.2 - 35.5 gm/dl  Status: FinalRDW-CV                                        Date: 04/28/2022Value: 12.7        Ref range: 11.5 - 14.5 %      Status: FinalRDW-SD                                        Date: 04/28/2022Value: 41.0        Ref range: 35.0 - 45.0 fL     Status: FinalPlatelets                                     Date: 04/28/2022Value: 227         Ref range: 130 - 400 thou/m*  Status: FinalMPV                                           Date: 04/28/2022Value: 11.1        Ref range: 9.4 - 12.4 fL      Status: FinalSeg Neutrophils                               Date: 04/28/2022Value: 78.5        Ref range: %                  Status: FinalLymphocytes                                   Date: 04/28/2022Value: 15.8        Ref range: %                  Status: FinalMonocytes Date: 04/28/2022Value: 5.1         Ref range: %                  Status: FinalEosinophils                                   Date: 04/28/2022Value: 0.0         Ref range: %                  Status: FinalBasophils                                     Date: 04/28/2022Value: 0.2         Ref range: %                  Status: FinalImmature Granulocytes                         Date: 04/28/2022Value: 0.4         Ref range: %                  Status: FinalSegs Absolute                                 Date: 04/28/2022Value: 7.5         Ref range: 1.8 - 7.7 thou/m*  Status: FinalLymphocytes Absolute                          Date: 04/28/2022Value: 1.5         Ref range: 1.0 - 4.8 thou/m*  Status: FinalMonocytes Absolute                            Date: 04/28/2022Value: 0.5         Ref range: 0.4 - 1.3 thou/m*  Status: FinalEosinophils Absolute                          Date: 04/28/2022Value: 0.0         Ref range: 0.0 - 0.4 thou/m*  Status: FinalBasophils Absolute                            Date: 04/28/2022Value: 0.0         Ref range: 0.0 - 0.1 thou/m*  Status: FinalImmature Grans (Abs)                          Date: 04/28/2022Value: 0.04        Ref range: 0.00 - 0.07 thou*  Status: FinalnRBC                                          Date: 04/28/2022Value: 0           Ref range: /100 wbc           Status: Final              Comment: Performed at [address] [facility], [address] [place]                                     Date: 04/28/2022Value: 14.0        Ref range: 8.0 - 16.0 meq/L   Status: Final              Comment: ANION GAP = Sodium -(Chloride + CO2)Performed at [address] [place], [address], [place] Filt Rate                           Date: 04/28/2022Value: >90         Ref range: ml/min/1.73m2      Status: Final              Comment: Stage Description                    GFR, ml/min/1.73 m2 -   At increased risk               > or = 60 (with chronic kidney disease risk factors) 1   Normal or increased GFR         > or = 90 2   Mildly or decreased GFR         60 - 89 3   Moderately decreased GFR        30 - 59 4   Severely decreased GFR          15 - 29 5   Kidney failure                  <15 (or dialysis)Estimated GFR calculated using abbreviated MDRD formula asrecommended by [employer]. Calculation basedupon serum creatinine and adjusted for age, gender & race. Zulema. Internal Med., Vol. 139 (2) pg 137-147. Performed at [address] [place] [address] [id number]                                       Date: 04/29/2022Value: 80          Ref range: 70 - 108 mg/dL     Status: FinalCREATININE                                    Date: 04/29/2022Value: 0.7         Ref range: 0.4 - 1.2 mg/dL    Status: FinalBUN                                           Date: 04/29/2022Value: 7           Ref range: 7 - 22 mg/dL       Status: FinalSodium                                        Date: 04/29/2022Value: 143         Ref range: 135 - 145 meq/L    Status: FinalPotassium reflex Magnesium                    Date: 04/29/2022Value: 4.2         Ref range: 3.5 - 5.2 meq/L    Status: FinalChloride                                      Date: 04/29/2022Value: 107         Ref range: 98 - 111 meq/L     Status: FinalCO2                                           Date: 04/29/2022Value: 24          Ref range: 23 - 33 meq/L      Status: FinalCalcium                                       Date: 04/29/2022Value: 8.5         Ref range: 8.5 - 10.5 mg/dL   Status: FinalAST                                           Date: 04/29/2022Value: 23          Ref range: 5 - 40 U/L         Status: FinalAlkaline Phosphatase                          Date: 04/29/2022Value: 69          Ref range: 38 - 126 U/L       Status: FinalTotal Protein                                 Date: 04/29/2022Value: 6.0*        Ref range: 6.1 - 8.0 g/dL     Status: FinalAlbumin                                       Date: 04/29/2022Value: 3.3*        Ref range: 3.5 - 5.1 g/dL     Status: FinalTotal Bilirubin                               Date: 04/29/2022Value: 0.6         Ref range: 0.3 - 1.2 mg/dL    Status: FinalALT                                           Date: 04/29/2022Value: 9*          Ref range: 11 - 66 U/L        Status: Final              Comment: Performed at [address] [place] [address], [address] [facility]                                           Date: 04/29/2022Value: 10.5        Ref range: 4.8 - 10.8 thou/*  Status: FinalRBC                                           Date: 04/29/2022Value: 4.05*       Ref range: 4.20 - 5.40 mill*  Status: FinalHemoglobin                                    Date: 04/29/2022Value: 11.6*       Ref range: 12.0 - 16.0 gm/dl  Status: FinalHematocrit                                    Date: 04/29/2022Value: 36.9*       Ref range: 37.0 - 47.0 %      Status: FinalMCV                                           Date: 04/29/2022Value: 91.1        Ref range: 81.0 - 99.0 fL     Status: [facility]                                           Date: 04/29/2022Value: 28.6        Ref range: 26.0 - 33.0 pg     Status: [facility]                                          Date: 04/29/2022Value: 31.4*       Ref range: 32.2 - 35.5 gm/dl  Status: FinalRDW-CV                                        Date: 04/29/2022Value: 12.8        Ref range: 11.5 - 14.5 %      Status: FinalRDW-SD                                        Date: 04/29/2022Value: 42.6        Ref range: 35.0 - 45.0 fL     Status: FinalPlatelets                                     Date: 04/29/2022Value: 208         Ref range: 130 - 400 thou/m*  Status: FinalMPV                                           Date: 04/29/2022Value: 11.7        Ref range: 9.4 - 12.4 fL      Status: FinalSeg Neutrophils                               Date: 04/29/2022Value: 53.0        Ref range: %                  Status: FinalLymphocytes                                   Date: 04/29/2022Value: 38.3        Ref range: %                  Status: FinalMonocytes                                     Date: 04/29/2022Value: 7.1         Ref range: %                  Status: FinalEosinophils                                   Date: 04/29/2022Value: 0.8         Ref range: %                  Status: FinalBasophils                                     Date: 04/29/2022Value: 0.5         Ref range: %                  Status: FinalImmature Granulocytes                         Date: 04/29/2022Value: 0.3         Ref range: %                  Status: FinalSegs Absolute                                 Date: 04/29/2022Value: 5.6         Ref range: 1.8 - 7.7 thou/m*  Status: FinalLymphocytes Absolute                          Date: 04/29/2022Value: 4.0         Ref range: 1.0 - 4.8 thou/m*  Status: FinalMonocytes Absolute                            Date: 04/29/2022Value: 0.7         Ref range: 0.4 - 1.3 thou/m*  Status: FinalEosinophils Absolute                          Date: 04/29/2022Value: 0.1         Ref range: 0.0 - 0.4 thou/m*  Status: FinalBasophils Absolute                            Date: 04/29/2022Value: 0.1         Ref range: 0.0 - 0.1 thou/m*  Status: FinalImmature Grans (Abs)                          Date: 04/29/2022Value: 0.03        Ref range: 0.00 - 0.07 thou*  Status: FinalnRBC                                          Date: 04/29/2022Value: 0           Ref range: /100 wbc           Status: Final              Comment: Performed at [address] [place] [address] [facility] [place]                                     Date: 04/29/2022Value: 12.0        Ref range: 8.0 - 16.0 meq/L   Status: Final              Comment: ANION GAP = Sodium -(Chloride + CO2)Performed at [address] [place] [address], [address] [place] [address] Filt Rate                           Date: 04/29/2022Value: 80*         Ref range: ml/min/1.73m2 Status: Final              Comment: Stage Description                    GFR, ml/min/1.73 m2 -   At increased risk               > or = 60 (with chronic                                     kidney disease risk factors) 1   Normal or increased GFR         > or = 90 2   Mildly or decreased GFR         60 - 89 3   Moderately decreased GFR        30 - 59 4   Severely decreased GFR          15 - 29 5   Kidney failure                  <15 (or dialysis)Estimated GFR calculated using abbreviated MDRD formula asrecommended by [employer]. Calculation basedupon serum creatinine and adjusted for age, gender & race. Zulema. Internal Med., Vol. 139 (2) pg 137-147. Performed at [facility].[place], [address]------------    Radiology last 7 days:  XR ANKLE RIGHT (2 VIEWS)Result Date: 4/27/20221. Interval placement of internal fixation hardware bridging the anatomically aligned fractures of the distal tibia and fibula without evidence of acute complication. This document has been electronically signed by: Tierney Sanchez MD on 04/27/2022 09:28 PMXR ANKLE RIGHT (2 VIEWS)Result Date: 4/26/20221. Improved alignment of minimally displaced trimalleolar fractures of the right ankle. 2. Diffuse soft tissue edema overlying the right ankle. This document has been electronically signed by: Tierney Sanchez MD on 04/26/2022 06:53 PMXR ANKLE RIGHT (2 VIEWS)Result Date: 4/26/20221. Stable alignment of trimalleolar fracture of the right ankle, narrowing cast. This document has been electronically signed by: Tierney Sanchez MD on 04/26/2022 06:40 PMXR ANKLE RIGHT (MIN 3 VIEWS)Result Date: 4/26/2022Fracture/subluxation of the ankle involving at least the lateral and medial malleoli. As mentioned above, cannot definitely exclude an associated posterior malleolar fracture. CT of the ankle would be helpful for further evaluation. **This report has been created using voice recognition software.   It may contain minor errors which are inherent in voice recognition technology. ** Final report electronically signed by Dr. Miya Bowens on 4/26/2022 3:14 PMCT ANKLE RIGHT WO CONTRASTResult Date: 4/26/2022Trimalleolar fracture subluxation. **This report has been created using voice recognition software. It may contain minor errors which are inherent in voice recognition technology. ** Final report electronically signed by Dr. Miya Bowens on 4/26/2022 4:53 PM     [unfilled]    Discharge Medications    Current Discharge Medication ListSTART taking these medicationsrivaroxaban (XARELTO) 10 MG TABS tabletTake 1 tablet by mouth daily (with breakfast)Qty: 30 tablet Refills: 0    Current Discharge Medication List    Current Discharge Medication ListCONTINUE these medications which have NOT CHANGEDNutritional Supplements (VITAMIN D BOOSTER PO)Take 1,000 mcg by mouth dailydiphenhydrAMINE (BENADRYL) 50 MG capsuleTake 50 mg by mouth every 6 hours as needed for Itchingcarbamide peroxide (DEBROX) 6.5 % otic solutionPlace 5 drops into both ears as neededbisacodyl (DULCOLAX) 10 MG suppositoryPlace 10 mg rectally as needed for Constipationdocusate sodium (COLACE) 100 MG capsuleTake 100 mg by mouth 3 times daily as needed for ConstipationEPINEPHrine HCl, Anaphylaxis, (EPIPEN IM)Inject into the muscle as neededhydrocortisone 1 % creamApply topically 2 times daily Apply topically 2 times daily to facealuminum & magnesium hydroxide-simethicone (MAALOX) 603-726-27 MG/5ML SUSP suspensionTake by mouth as needed for Indigestionmagnesium hydroxide (MILK OF MAGNESIA) 400 MG/5ML suspensionTake 30 mLs by mouth as needed for Constipationpolyethylene glycol (MIRALAX) 17 g PACK packetTake 17 g by mouth as neededSennosides (SENOKOT PO)Take 2 tablets by mouth daily as neededacetaminophen (TYLENOL) 325 MG tabletTake 650 mg by mouth every 6 hours as needed for PainFLUoxetine (PROZAC) 20 MG capsuleTake 20 mg by mouth dailymetoprolol succinate (TOPROL XL) 50 MG extended release tabletTake 50 mg by mouth dailyQUEtiapine (SEROQUEL) 50 MG tabletTake 50 mg by mouth nightly spironolactone (ALDACTONE) 25 MG tabletTake 12.5 mg by mouth dailythiamine mononitrate 100 MG tabletTake 100 mg by mouth dailyLORazepam (ATIVAN) 1 MG tabletTake 1 mg by mouth every 12 hours as needed for Anxiety. EPINEPHrine (EPIPEN 2-LUZ) 0.3 MG/0.3ML SOAJ injectionInject 0.3 mLs into the muscle once for 1 dose Use as directed for allergic reactionQty: 2 each Refills: 0Ascorbic Acid (VITAMIN C) 500 MG CAPSTake 1 tablet by mouth daily    Current Discharge Medication List    Time Spent on Discharge:E] minutes were spent in patient examination, evaluation, counseling as well as medication reconciliation, prescriptions for required medications, discharge plan, and follow up.     Electronically signed by Lauren Newman DPM on 4/29/22 at 2:29 PM EDT

## 2022-04-29 NOTE — PROGRESS NOTES
[address] [address]  INPATIENT PHYSICAL THERAPY  DAILY NOTE  [facility] ORTHOPEDICS 7K - 7K-21/021-A  Time In: 9697  Time Out: 1038  Timed Code Treatment Minutes: 23 Minutes  Minutes: 23          Date: 2022  Patient Name: Butch Grey,  Gender:  female        MRN: [id number]  : 1970  (45 y.o.)     Referring Practitioner: BHASKAR Pond  Diagnosis: trimalleolar fracture of R ankle  Additional Pertinent Hx: per EMR Estela Lockett is a 45 y.o. female evaluated for trauma consult, brought by EMS following a mechanical with no LOC; past medical history short-term memory loss, Warnicke Korsakoff syndrome, A. fib, HTN, anxiety, major depressive disorder. Per report patient was walking with another individual when she fell on the grass and was unable to stand up and ambulate following incident. She states she has no pain, just that her right ankle feels \"weird\". Patient reports odd sensation in right ankle. Patient denies chest pain, shortness of breath, cough, headache, dizziness, lightheadedness, numbness, paraesthesias, weakness, chills, fevers, abdominal pain, nausea, vomiting, diarrhea, blood in stool, neck pain, or back pain. Care in coordination with trauma surgeon Dr. Nancie Lewis. \" Pt is s/p ORIF Trimalleolar Ankle Fracture Right completed by Dr. Vivienne Rodriguez on 22. Prior Level of Function:  Lives With: Alone  Type of Home: Assisted living  Home Layout: One level  Home Access: Level entry  Home Equipment: Grab bars   Bathroom Shower/Tub: Walk-in shower  Bathroom Toilet: Standard  Bathroom Equipment: Grab bars in shower    Receives Help From: Other (comment) (facility staff)  ADL Assistance: Independent  Homemaking Assistance: Needs assistance  Ambulation Assistance: Independent  Transfer Assistance: Independent  Active : No  Additional Comments: Pt reprots she is IND with all functional mobility prior with no AD. Pt IND with ADLs.  Pt in jail due to \"loss of short term memory. \"    Restrictions/Precautions:  Restrictions/Precautions: General Precautions,Fall Risk,Weight Bearing  Right Lower Extremity Weight Bearing: Non Weight Bearing  Position Activity Restriction  Other position/activity restrictions: cast on R LE     SUBJECTIVE: Ok to see pt per nursing. Pt in bedside chair when PT arrived, agreeable to PT session. Pt agreeable to remain in chair for lunch. PAIN: 3/10: L lower leg/ankle    Vitals: Vitals not assessed per clinical judgement, see nursing flowsheet    OBJECTIVE:  Bed Mobility:  Not Tested    Transfers:  Sit to Stand: Stand By Assistance, [address], X 1, cues for hand placement, with verbal cues  Stand to Sit:Stand By Assistance, [employer] Assistance, X 1, cues for hand placement, with verbal cues  Cues for maintaining RW in close proximity to self at all times as pt tends to push walker to the side and turn and pivots on L LE, fair demo. Good compliance with WB status  Ambulation:  Stand By Assistance, Contact Guard Assistance, X 1, with cues for safety, with verbal cues , with increased time for completion  Distance: 50 ft, 60 ft, 18ft  Surface: Level Tile  Device:Rolling Walker  Gait Deviations:  Slow Enma, Decreased Step Length on Left and Decreased Gait Speed,  \"hopping\" pattern with completion, no LOB noted, SBA consistently, required CGA with turning for safety  Balance:  Static Sitting Balance:  Independent  Dynamic Sitting Balance: Supervision  Static Standing Balance: Stand By Assistance, Contact Guard Assistance, X 1  RW for support in standing  Exercise:  Patient was guided in 1 set(s) 10-15 reps of exercise to right lower extremities. Straight leg raises, Standing marches, Standing hip abduction, Standing hip extension, Standing hamstring curls and Standing hip flexion. Exercises were completed for increased independence with functional mobility.  VC and visual cues for proper technique of exercises for completion with good demo.     Functional Outcome Measures: Completed  AM-PAC Inpatient Mobility without Stair Climbing Raw Score : 15  AM-PAC Inpatient without Stair Climbing T-Scale Score : 43.03    ASSESSMENT:  Assessment: Patient progressing toward established goals. Activity Tolerance:  Patient tolerance of  treatment: good. Equipment Recommendations:Equipment Needed: Yes  Mobility Devices: Kaitlynn Flash: Rolling  Discharge Recommendations: 24 hour assistance or supervision and Home with Home Health PT  Plan: Specific Instructions for Next Treatment: gait, mobility, ther ex  Current Treatment Recommendations: Strengthening,Equipment evaluation, education, & procurement,Gait training,Balance training,Transfer training,Home exercise program,Endurance training,Patient/Caregiver education & training,Therapeutic activities,Safety education & training,Neuromuscular re-education  Plan:  (6x O)  Specific Instructions for Next Treatment: gait, mobility, ther ex    Patient Education  Patient Education: Plan of Care, Precautions/Restrictions, Equipment Education, Transfers, Gait, Use of [employer], Verbal Exercise Instruction,  - Patient Verbalized Understanding, - Patient Requires Continued Education    Goals:  Patient goals : \"get better, walk\"  Short Term Goals  Time Frame for Short term goals: by discharge  Short term goal 1: Pt will amb for >50 feet with S with no LOB with good demo of WB status and RW for support to progress towards PLOF. Short term goal 2: Pt will demo IND with transfers with RW for support with good demo of WB status with good safety to progress with mobility. Short term goal 3: Pt will demo IND with bed mobility tasks with bed flat to progress with mobility. Short term goal 4: Pt will tolerate 10-20 reps of ther ex to increase overall mobility. Long Term Goals  Time Frame for Long term goals : NA due to short ELOS    Following session, patient left in safe position with all fall risk precautions in place.  In chair, alarm on.

## 2022-04-29 NOTE — PROGRESS NOTES
[address] [facility]  Occupational Therapy  Daily Note  Time:   Time In: 316  Time Out: 2750  Timed Code Treatment Minutes: 23 Minutes  Minutes: 23          Date: 2022  Patient Name: Neena Stacy,   Gender: female      Room: [facility]021-A  MRN: [id number]  : 1970  (45 y.o.)  Referring Practitioner: BHASKAR Real  Diagnosis: trimalleoar fracture of right ankle, closed, inital encounter  Additional Pertinent Hx: per EMR Opal Guzman is a 45 y.o. female evaluated for trauma consult, brought by EMS following a mechanical with no LOC; past medical history short-term memory loss, Warnicke Korsakoff syndrome, A. fib, HTN, anxiety, major depressive disorder. Per report patient was walking with another individual when she fell on the grass and was unable to stand up and ambulate following incident. She states she has no pain, just that her right ankle feels \"weird\". Patient reports odd sensation in right ankle. Patient denies chest pain, shortness of breath, cough, headache, dizziness, lightheadedness, numbness, paraesthesias, weakness, chills, fevers, abdominal pain, nausea, vomiting, diarrhea, blood in stool, neck pain, or back pain. Care in coordination with trauma surgeon Dr. Alex Duran. \" Pt is s/p ORIF Trimalleolar Ankle Fracture Right completed by Dr. Joseph Carlin on 22. Restrictions/Precautions:  Restrictions/Precautions: General Precautions,Fall Risk,Weight Bearing  Right Lower Extremity Weight Bearing: Non Weight Bearing  Position Activity Restriction  Other position/activity restrictions: cast on R LE     SUBJECTIVE: Pt laying in bed upon arrival, pt agreeable to OT session, RN gave verbal approval for session    PAIN: 0/10:     Vitals: Nurse checked vitals prior to session    COGNITION: Impaired Memory    ADL:   Grooming: Stand By Assistance.   with pt sitting at the EOB to complete brushing hair, stating she already previously brushed teeth  Upper Extremity Dressing: Stand By Assistance. for donning scrub shirt  Lower Extremity Dressing: Minimal Assistance. with pt able to sit at the EOB to don around BLE, with pt educated on donning RLE first, and min A with tying shorts while standing. BALANCE:  Standing Balance: Contact Guard Assistance. with pt requiring BUE on RW due to NWB with RLE, with good adherence to NWB, however when pt had released 1 UE from walker, slight LOB occured with pt educated on BUE placement    BED MOBILITY:  Supine to Sit: Stand By Assistance with HOB elevated    TRANSFERS:  Sit to Stand:  Contact Guard Assistance. from the EOB  Stand to Sit: Contact Guard Assistance. to recliner, with vc's for safety with reaching back to sit    FUNCTIONAL MOBILITY:  Assistive Device: Rolling Walker  Assist Level:  Contact Guard Assistance. Distance: from the EOB to recliner, with pt hopping 3-4 steps and good adherence to NWB      ASSESSMENT:     Activity Tolerance:  Patient tolerance of  treatment: good. Discharge Recommendations: 24 hour assistance or supervision  Equipment Recommendations: Equipment Needed: Yes (RW)  Plan: Times per Week: 5x  Times per Day: Daily  Current Treatment Recommendations: Strengthening,Balance training,Functional mobility training,Endurance training,Patient/Caregiver education & training,Safety education & training,Self-Care / ADL    Patient Education  Patient Education: ADL's    Goals  Short Term Goals  Time Frame for Short term goals: by discharge  Short Term Goal 1: pt will complete functional mobility to/from bathroom with maintaining NWB of RLE mod-I to access ADLs  Short Term Goal 2: pt will complete BADL routine with mod-I with maintaining NWB of RLE to increase indep with self care tasks  Short Term Goal 3: pt will complete sit<>stand transfers, including to/from toilet, mod-I to access toilet. Following session, patient left in safe position with all fall risk precautions in place.

## 2022-04-30 PROCEDURE — 6370000000 HC RX 637 (ALT 250 FOR IP)

## 2022-04-30 PROCEDURE — 6370000000 HC RX 637 (ALT 250 FOR IP): Performed by: PHYSICIAN ASSISTANT

## 2022-04-30 PROCEDURE — 1200000000 HC SEMI PRIVATE

## 2022-04-30 PROCEDURE — 6370000000 HC RX 637 (ALT 250 FOR IP): Performed by: STUDENT IN AN ORGANIZED HEALTH CARE EDUCATION/TRAINING PROGRAM

## 2022-04-30 RX ADMIN — SPIRONOLACTONE 12.5 MG: 25 TABLET ORAL at 07:59

## 2022-04-30 RX ADMIN — OXYCODONE HYDROCHLORIDE AND ACETAMINOPHEN 500 MG: 500 TABLET ORAL at 08:11

## 2022-04-30 RX ADMIN — Medication 100 MG: at 07:59

## 2022-04-30 RX ADMIN — FAMOTIDINE 20 MG: 20 TABLET ORAL at 07:59

## 2022-04-30 RX ADMIN — QUETIAPINE FUMARATE 50 MG: 25 TABLET ORAL at 20:37

## 2022-04-30 RX ADMIN — FAMOTIDINE 20 MG: 20 TABLET ORAL at 20:37

## 2022-04-30 RX ADMIN — OXYCODONE HYDROCHLORIDE AND ACETAMINOPHEN 2 TABLET: 5; 325 TABLET ORAL at 16:30

## 2022-04-30 RX ADMIN — FLUOXETINE 20 MG: 20 CAPSULE ORAL at 07:59

## 2022-04-30 RX ADMIN — METOPROLOL SUCCINATE 50 MG: 50 TABLET, EXTENDED RELEASE ORAL at 08:10

## 2022-04-30 ASSESSMENT — PAIN SCALES - GENERAL
PAINLEVEL_OUTOF10: 1
PAINLEVEL_OUTOF10: 0
PAINLEVEL_OUTOF10: 3

## 2022-04-30 NOTE — CARE COORDINATION
04/30/22 8:22 AM    Received call from primary RN; states patient's mother reports SR  is to call Torsten Mckayjasyhree at [facility] regarding patient's ability to return there. This CM called and talked to [address] [place] [address] to get update on what she knew of the patient situation and cancelled discharge yesterday. She states the [facility] would not take her back d/t patient NWB to RLE with patient's short term memory issues the [facility] did not feel they can accept back d/t the [place] part not having the staffing after 7PM to ensure the patient is safe. Director at [facility], [address] [place] [address], felt patient will need to go to skilled facility. CM states [facility] of [address] [place] [address] skilled area cannot accept patient's insurance, will need to go to a different facility. (See CM note from 4/29 and SW note from 4/27.) This CM then called [facility] [place] [address] [place] [address] and spoke with Alejandrina. Alejandrina states Torsten Mo is not in today. Alejandrina states she does not know of any other information of a conversation Sarmiento Chepe may have had with patient's mother. She states they are not able to take her back to Assisted Living as she needs to be independent. .     CM called Jimena, primary RN, and updated. Walt Ramirez states she will speak with patient's mother, Casey Morse, and update.

## 2022-04-30 NOTE — PROGRESS NOTES
Podiatric Progress Note  Spencer Tan  Subjective :   4/30/2022  Patient seen at bedside this morning behalf of Dr. Bridget Roman. Patient is pleasant, and is alert and oriented to person, place, and time. Patient is 3 days status post ORIF of right ankle fracture (DOS 4/27/2022). Patient states she has 6/10 pain to the right ankle, but that the pain is tolerable with the current pain medication regimen. Patient currently denies any N/V/F/C/SOB/CP or bilateral calf tenderness. Patient has no other pedal complaints at this time. Patient wanted to know why her discharge was held up, and where she will be discharging to.    4/28/2022  Patient seen at bedside today behalf of Dr. Bridget Roman. Patient appeared pleasant, was oriented to person, place and time and in no acute distress. Patient is 1 day s/p ORIF of right ankle fracture. Patient currently endorses mild to moderate pain to her right lower leg. Patient denies any N/V/F/C/SOB or CP. Patient has no further pedal concerns at this point in time. Patient wanted to know when she could be discharged home. HISTORY OF PRESENT ILLNESS:    The patient is a 45 y.o. female with significant past medical history of Wernicke-Korsakoff syndrome, paroxysmal atrial fibrillation, major depressive disorder, and GERD who is seen on behalf of Dr. Dwaine Reid and presents with complaints of right ankle pain. Patient has short term memory loss and secondary to this cannot provide much history of present illness. Per ER Physician and Resident, patient sustained a fall at a nursing facility earlier today. It did not appear that she sustained any other injuries. She was then brought to [facility] for further evaluation and management. At this point in time. Patient says that she does not have any pain in her right ankle. She does state that it is uncomfortable and is wondering if there is anything that we can do to make it feel better. She denies any pain elsewhere. Patient endorses sensation to her right foot. Patient denies any nausea, vomiting, fever, chills, chest pain, shortness of breath. No other pedal complaints.     Current Medications:    Current Facility-Administered Medications: famotidine (PEPCID) tablet 20 mg, 20 mg, Oral, BID  oxyCODONE-acetaminophen (PERCOCET) 5-325 MG per tablet 1 tablet, 1 tablet, Oral, Q6H PRN **OR** oxyCODONE-acetaminophen (PERCOCET) 5-325 MG per tablet 2 tablet, 2 tablet, Oral, Q4H PRN  ascorbic acid (VITAMIN C) tablet 500 mg, 500 mg, Oral, Daily  FLUoxetine (PROZAC) capsule 20 mg, 20 mg, Oral, Daily  metoprolol succinate (TOPROL XL) extended release tablet 50 mg, 50 mg, Oral, Daily  QUEtiapine (SEROQUEL) tablet 50 mg, 50 mg, Oral, Nightly  spironolactone (ALDACTONE) tablet 12.5 mg, 12.5 mg, Oral, Daily  thiamine tablet 100 mg, 100 mg, Oral, Daily  sodium chloride flush 0.9 % injection 5-40 mL, 5-40 mL, IntraVENous, 2 times per day  sodium chloride flush 0.9 % injection 5-40 mL, 5-40 mL, IntraVENous, PRN  0.9 % sodium chloride infusion, 25 mL, IntraVENous, PRN  acetaminophen (TYLENOL) tablet 650 mg, 650 mg, Oral, Q4H PRN  ondansetron (ZOFRAN-ODT) disintegrating tablet 4 mg, 4 mg, Oral, Q8H PRN **OR** ondansetron (ZOFRAN) injection 4 mg, 4 mg, IntraVENous, Q6H PRN  polyethylene glycol (GLYCOLAX) packet 17 g, 17 g, Oral, Daily  bisacodyl (DULCOLAX) suppository 10 mg, 10 mg, Rectal, Daily  senna (SENOKOT) tablet 8.6 mg, 1 tablet, Oral, Daily PRN  fentaNYL (SUBLIMAZE) injection 25 mcg, 25 mcg, IntraVENous, Q1H PRN **OR** fentaNYL (SUBLIMAZE) injection 50 mcg, 50 mcg, IntraVENous, Q1H PRN    Objective     /68   Pulse 66   Temp 99.8 °F (37.7 °C) (Oral)   Resp 16   Ht 5' 8\" (1.727 m)   Wt 192 lb (87.1 kg)   LMP 05/01/2016   SpO2 96%   BMI 29.19 kg/m²      I/O:    Intake/Output Summary (Last 24 hours) at 4/30/2022 1346  Last data filed at 4/29/2022 1824  Gross per 24 hour   Intake 650 ml   Output    Net 650 ml              Wt Readings from Last 3 Encounters:   04/29/22 192 lb (87.1 kg)   11/06/20 202 lb 9.6 oz (91.9 kg)   10/07/19 172 lb (78 kg)       LABS:    Recent Labs     04/28/22  0535 04/29/22  0633   WBC 9.6 10.5   HGB 11.8* 11.6*   HCT 36.4* 36.9*    208        Recent Labs     04/29/22  0633      K 4.2      CO2 24   BUN 7   CREATININE 0.7        Recent Labs     04/28/22  0535 04/29/22  0633   PROT 6.3 6.0*      No results for input(s): CKTOTAL, CKMB, CKMBINDEX, TROPONINI in the last 72 hours. Focused Lower Extremity Examination:    Vitals:    /68   Pulse 66   Temp 99.8 °F (37.7 °C) (Oral)   Resp 16   Ht 5' 8\" (1.727 m)   Wt 192 lb (87.1 kg)   LMP 05/01/2016   SpO2 96%   BMI 29.19 kg/m²      Vascular: Skin temperature is warm to digits 1 through 5 of right foot. CFT less than 3 seconds to all 5 digits of right foot. Dermatologic: Below-knee cast is clean, dry, and intact to right lower leg. Nails 1-5 bilaterally are normotrophic. Webspaces 1-4 bilaterally are clean, dry and intact. Neurovascular:  Light touch sensation grossly intact in the saphenous, deep peroneal, superficial peroneal, sural, and tibial nerve distributions bilaterally. Musculoskeletal: Muscle strength testing deferred due to presence of cast to right lower leg. Patient is able to wiggle toes of the right foot.     ASSESSMENT: Pt. is a 45 y.o. female with:  Principle  S/p ORIF of right ankle    Chronic  Patient Active Problem List   Diagnosis    Sigmoid diverticulitis    Hypertension    Hyperlipemia    GERD (gastroesophageal reflux disease)    S/P gastric bypass    Anxiety    Alcohol withdrawal (HCC)    [address]ism ([address]    Amnesia    Cardiomyopathy ([address]    Depression    Mood disorder, drug-induced ([address]    Organic mood disorder    Wernicke-Korsakoff syndrome (HCC)    Moderate episode of recurrent major depressive disorder ([address]    Trimalleolar fracture of right ankle, closed, initial encounter    Falls, initial encounter       PLAN:   S/p ORIF of right ankle  -Patient examined and evaluated  -Has no new pedal complaints at this time  -Right lower leg below-knee cast is clean, dry, and intact  -Discussed with patient we will await her working with physical therapy and Occupational Therapy prior to making arrangements for discharge  -Discussed with patient at length that she was originally to be sent back to the skilled portion of her assisted living facility ([facility]), but that facility could not accept her due to her strict NWB status to the right lower extremity. Discussed with patient that we are awaiting placement into an SNF/ECF, and that she will likely discharge on Monday, 5/2/2022. Patient can then return to her assisted living facility after she is allowed to ambulate.  -Patient verbalized understanding and agreement with treatment plan as stated. All of her questions were answered to her satisfaction.       Charissa Finch DPM, PGY-2  Podiatric Surgical Resident  4/30/2022   1:46 PM

## 2022-05-01 PROCEDURE — 6370000000 HC RX 637 (ALT 250 FOR IP): Performed by: PHYSICIAN ASSISTANT

## 2022-05-01 PROCEDURE — 6370000000 HC RX 637 (ALT 250 FOR IP): Performed by: STUDENT IN AN ORGANIZED HEALTH CARE EDUCATION/TRAINING PROGRAM

## 2022-05-01 PROCEDURE — 94760 N-INVAS EAR/PLS OXIMETRY 1: CPT

## 2022-05-01 PROCEDURE — 6370000000 HC RX 637 (ALT 250 FOR IP)

## 2022-05-01 PROCEDURE — 1200000000 HC SEMI PRIVATE

## 2022-05-01 RX ADMIN — OXYCODONE HYDROCHLORIDE AND ACETAMINOPHEN 2 TABLET: 5; 325 TABLET ORAL at 20:11

## 2022-05-01 RX ADMIN — FAMOTIDINE 20 MG: 20 TABLET ORAL at 20:11

## 2022-05-01 RX ADMIN — FLUOXETINE 20 MG: 20 CAPSULE ORAL at 07:52

## 2022-05-01 RX ADMIN — OXYCODONE HYDROCHLORIDE AND ACETAMINOPHEN 1 TABLET: 5; 325 TABLET ORAL at 08:57

## 2022-05-01 RX ADMIN — QUETIAPINE FUMARATE 50 MG: 25 TABLET ORAL at 20:11

## 2022-05-01 RX ADMIN — Medication 100 MG: at 07:52

## 2022-05-01 RX ADMIN — OXYCODONE HYDROCHLORIDE AND ACETAMINOPHEN 2 TABLET: 5; 325 TABLET ORAL at 00:01

## 2022-05-01 RX ADMIN — SPIRONOLACTONE 12.5 MG: 25 TABLET ORAL at 07:52

## 2022-05-01 RX ADMIN — OXYCODONE HYDROCHLORIDE AND ACETAMINOPHEN 500 MG: 500 TABLET ORAL at 07:52

## 2022-05-01 RX ADMIN — FAMOTIDINE 20 MG: 20 TABLET ORAL at 07:52

## 2022-05-01 ASSESSMENT — PAIN SCALES - GENERAL
PAINLEVEL_OUTOF10: 0
PAINLEVEL_OUTOF10: 4
PAINLEVEL_OUTOF10: 7
PAINLEVEL_OUTOF10: 0
PAINLEVEL_OUTOF10: 7

## 2022-05-01 ASSESSMENT — PAIN DESCRIPTION - ORIENTATION
ORIENTATION: RIGHT
ORIENTATION: RIGHT

## 2022-05-01 ASSESSMENT — PAIN DESCRIPTION - LOCATION
LOCATION: LEG
LOCATION: ANKLE

## 2022-05-01 ASSESSMENT — PAIN DESCRIPTION - DESCRIPTORS: DESCRIPTORS: ACHING

## 2022-05-01 NOTE — PROGRESS NOTES
Podiatric Progress Note  Kamar Spear  Subjective :   5/1/2022  Patient seen at bedside this morning on behalf of Dr. Kristi Kent. Patient is pleasant, and is alert and oriented to person, place, and time. Patient is 4 days s/p ORIF of right ankle fracture (DOS 4/27/2022. Patient states that her pain has lessened to about 5/10, and is well managed with the current pain medication regimen. She also denies any N/V/F/C/SOB/CP or bilateral calf tenderness. Patient has no new pedal concerns at this time. Patient is aware that we are awaiting placement into an SNF/ECF, and that she will likely discharge on Monday, 5/2/2022.    4/30/2022  Patient seen at bedside this morning behalf of Dr. Kristi Kent. Patient is pleasant, and is alert and oriented to person, place, and time. Patient is 3 days status post ORIF of right ankle fracture (DOS 4/27/2022). Patient states she has 6/10 pain to the right ankle, but that the pain is tolerable with the current pain medication regimen. Patient currently denies any N/V/F/C/SOB/CP or bilateral calf tenderness. Patient has no other pedal complaints at this time. Patient wanted to know why her discharge was held up, and where she will be discharging to.    4/28/2022  Patient seen at bedside today behalf of Dr. Kristi Kent. Patient appeared pleasant, was oriented to person, place and time and in no acute distress. Patient is 1 day s/p ORIF of right ankle fracture. Patient currently endorses mild to moderate pain to her right lower leg. Patient denies any N/V/F/C/SOB or CP. Patient has no further pedal concerns at this point in time. Patient wanted to know when she could be discharged home. HISTORY OF PRESENT ILLNESS:    The patient is a 45 y.o. female with significant past medical history of Wernicke-Korsakoff syndrome, paroxysmal atrial fibrillation, major depressive disorder, and GERD who is seen on behalf of Dr. Joelle Wilson and presents with complaints of right ankle pain.  Patient has short term memory loss and secondary to this cannot provide much history of present illness. Per ER Physician and Resident, patient sustained a fall at a nursing facility earlier today. It did not appear that she sustained any other injuries. She was then brought to [facility] for further evaluation and management. At this point in time. Patient says that she does not have any pain in her right ankle. She does state that it is uncomfortable and is wondering if there is anything that we can do to make it feel better. She denies any pain elsewhere. Patient endorses sensation to her right foot. Patient denies any nausea, vomiting, fever, chills, chest pain, shortness of breath. No other pedal complaints.     Current Medications:    Current Facility-Administered Medications: famotidine (PEPCID) tablet 20 mg, 20 mg, Oral, BID  oxyCODONE-acetaminophen (PERCOCET) 5-325 MG per tablet 1 tablet, 1 tablet, Oral, Q6H PRN **OR** oxyCODONE-acetaminophen (PERCOCET) 5-325 MG per tablet 2 tablet, 2 tablet, Oral, Q4H PRN  ascorbic acid (VITAMIN C) tablet 500 mg, 500 mg, Oral, Daily  FLUoxetine (PROZAC) capsule 20 mg, 20 mg, Oral, Daily  metoprolol succinate (TOPROL XL) extended release tablet 50 mg, 50 mg, Oral, Daily  QUEtiapine (SEROQUEL) tablet 50 mg, 50 mg, Oral, Nightly  spironolactone (ALDACTONE) tablet 12.5 mg, 12.5 mg, Oral, Daily  thiamine tablet 100 mg, 100 mg, Oral, Daily  sodium chloride flush 0.9 % injection 5-40 mL, 5-40 mL, IntraVENous, 2 times per day  sodium chloride flush 0.9 % injection 5-40 mL, 5-40 mL, IntraVENous, PRN  0.9 % sodium chloride infusion, 25 mL, IntraVENous, PRN  acetaminophen (TYLENOL) tablet 650 mg, 650 mg, Oral, Q4H PRN  ondansetron (ZOFRAN-ODT) disintegrating tablet 4 mg, 4 mg, Oral, Q8H PRN **OR** ondansetron (ZOFRAN) injection 4 mg, 4 mg, IntraVENous, Q6H PRN  polyethylene glycol (GLYCOLAX) packet 17 g, 17 g, Oral, Daily  bisacodyl (DULCOLAX) suppository 10 mg, 10 mg, Rectal, Daily  senna (SENOKOT) tablet 8.6 mg, 1 tablet, Oral, Daily PRN  fentaNYL (SUBLIMAZE) injection 25 mcg, 25 mcg, IntraVENous, Q1H PRN **OR** fentaNYL (SUBLIMAZE) injection 50 mcg, 50 mcg, IntraVENous, Q1H PRN    Objective     BP (!) 91/54   Pulse 65   Temp 98.4 °F (36.9 °C) (Oral)   Resp 16   Ht 5' 8\" (1.727 m)   Wt 192 lb (87.1 kg)   LMP 05/01/2016   SpO2 93%   BMI 29.19 kg/m²      I/O:    Intake/Output Summary (Last 24 hours) at 5/1/2022 1141  Last data filed at 5/1/2022 0437  Gross per 24 hour   Intake 750 ml   Output    Net 750 ml              Wt Readings from Last 3 Encounters:   04/29/22 192 lb (87.1 kg)   11/06/20 202 lb 9.6 oz (91.9 kg)   10/07/19 172 lb (78 kg)       LABS:    Recent Labs     04/29/22  0633   WBC 10.5   HGB 11.6*   HCT 36.9*           Recent Labs     04/29/22  0633      K 4.2      CO2 24   BUN 7   CREATININE 0.7        Recent Labs     04/29/22  0633   PROT 6.0*      No results for input(s): CKTOTAL, CKMB, CKMBINDEX, TROPONINI in the last 72 hours. Focused Lower Extremity Examination:    Vitals:    BP (!) 91/54   Pulse 65   Temp 98.4 °F (36.9 °C) (Oral)   Resp 16   Ht 5' 8\" (1.727 m)   Wt 192 lb (87.1 kg)   LMP 05/01/2016   SpO2 93%   BMI 29.19 kg/m²      Vascular: Skin temperature is warm to digits 1 through 5 of right foot. CFT less than 3 seconds to all 5 digits of right foot. Dermatologic: Below-knee cast is clean, dry, and intact to right lower leg. Nails 1-5 bilaterally are normotrophic. Webspaces 1-4 bilaterally are clean, dry and intact. Neurovascular:  Light touch sensation grossly intact in the saphenous, deep peroneal, superficial peroneal, sural, and tibial nerve distributions bilaterally. Musculoskeletal: Musculoskeletal exam limited due to presence of cast to right lower leg. Patient is able to wiggle toes of the right foot. No calf pain with wiggling of toes to the RLE.     ASSESSMENT: Pt. is a 45 y.o. female with:  Principle  S/p ORIF of right ankle    Chronic  Patient Active Problem List   Diagnosis    Sigmoid diverticulitis    Hypertension    Hyperlipemia    GERD (gastroesophageal reflux disease)    S/P gastric bypass    Anxiety    Alcohol withdrawal (HCC)    Alcoholism ([address]    Amnesia    Cardiomyopathy ([address]    Depression    Mood disorder, drug-induced ([address]    Organic mood disorder    Wernicke-Korsakoff syndrome (HCC)    Moderate episode of recurrent major depressive disorder ([address]    Trimalleolar fracture of right ankle, closed, initial encounter    Falls, initial encounter       PLAN:   S/p ORIF of right ankle  -Patient examined and evaluated  -Patient has no new pedal complaints at this time  -Pain to the right ankle appears to be improving; discussed with the patient that postop pain is worst 2-4 days after surgery, and that it starts to lessen thereafter.  -Right lower leg below-knee cast is clean, dry, and intact  -Discussed with patient we will await her working with physical therapy and Occupational Therapy prior to making arrangements for discharge  -Discussed with patient at length that she was originally to be sent back to the skilled portion of her assisted living facility ([facility] at [address] [place] [address]), but that facility could not accept her due to her strict NWB status to the right lower extremity. Discussed with patient that we are awaiting placement into an SNF/ECF, and that she will likely discharge on Monday, 5/2/2022. Patient can then return to her assisted living facility after she is allowed to ambulate.  -Patient verbalized understanding and agreement with treatment plan as stated. All of her questions were answered to her satisfaction.       Tommy Barron DPM, PGY-2  Podiatric Surgical Resident  5/1/2022   11:41 AM

## 2022-05-02 VITALS
WEIGHT: 192 LBS | HEART RATE: 82 BPM | DIASTOLIC BLOOD PRESSURE: 67 MMHG | HEIGHT: 68 IN | RESPIRATION RATE: 16 BRPM | SYSTOLIC BLOOD PRESSURE: 94 MMHG | TEMPERATURE: 98.4 F | OXYGEN SATURATION: 97 % | BODY MASS INDEX: 29.1 KG/M2

## 2022-05-02 PROCEDURE — 6370000000 HC RX 637 (ALT 250 FOR IP): Performed by: STUDENT IN AN ORGANIZED HEALTH CARE EDUCATION/TRAINING PROGRAM

## 2022-05-02 PROCEDURE — 97535 SELF CARE MNGMENT TRAINING: CPT

## 2022-05-02 PROCEDURE — 6370000000 HC RX 637 (ALT 250 FOR IP): Performed by: PHYSICIAN ASSISTANT

## 2022-05-02 PROCEDURE — 6360000002 HC RX W HCPCS: Performed by: STUDENT IN AN ORGANIZED HEALTH CARE EDUCATION/TRAINING PROGRAM

## 2022-05-02 PROCEDURE — 6370000000 HC RX 637 (ALT 250 FOR IP)

## 2022-05-02 PROCEDURE — 97110 THERAPEUTIC EXERCISES: CPT

## 2022-05-02 PROCEDURE — 97116 GAIT TRAINING THERAPY: CPT

## 2022-05-02 RX ORDER — OXYCODONE HYDROCHLORIDE AND ACETAMINOPHEN 5; 325 MG/1; MG/1
1 TABLET ORAL EVERY 6 HOURS PRN
Qty: 20 TABLET | Refills: 0 | Status: SHIPPED | OUTPATIENT
Start: 2022-05-02 | End: 2022-05-07

## 2022-05-02 RX ORDER — DIPHENHYDRAMINE HCL 25 MG
25 TABLET ORAL ONCE
Status: COMPLETED | OUTPATIENT
Start: 2022-05-02 | End: 2022-05-02

## 2022-05-02 RX ORDER — ENOXAPARIN SODIUM 100 MG/ML
40 INJECTION SUBCUTANEOUS DAILY
Status: DISCONTINUED | OUTPATIENT
Start: 2022-05-02 | End: 2022-05-02 | Stop reason: HOSPADM

## 2022-05-02 RX ORDER — RIVAROXABAN 10 MG/1
10 TABLET, FILM COATED ORAL
Qty: 30 TABLET | Refills: 0 | Status: SHIPPED | OUTPATIENT
Start: 2022-05-02 | End: 2022-06-01

## 2022-05-02 RX ORDER — OXYCODONE HYDROCHLORIDE AND ACETAMINOPHEN 5; 325 MG/1; MG/1
1 TABLET ORAL EVERY 6 HOURS PRN
Qty: 20 TABLET | Refills: 0 | Status: SHIPPED | OUTPATIENT
Start: 2022-05-02 | End: 2022-05-02 | Stop reason: HOSPADM

## 2022-05-02 RX ADMIN — FAMOTIDINE 20 MG: 20 TABLET ORAL at 08:11

## 2022-05-02 RX ADMIN — ENOXAPARIN SODIUM 40 MG: 100 INJECTION SUBCUTANEOUS at 15:22

## 2022-05-02 RX ADMIN — DIPHENHYDRAMINE HCL 25 MG: 25 TABLET ORAL at 17:12

## 2022-05-02 RX ADMIN — POLYETHYLENE GLYCOL 3350 17 G: 17 POWDER, FOR SOLUTION ORAL at 08:11

## 2022-05-02 RX ADMIN — OXYCODONE HYDROCHLORIDE AND ACETAMINOPHEN 1 TABLET: 5; 325 TABLET ORAL at 16:09

## 2022-05-02 RX ADMIN — OXYCODONE HYDROCHLORIDE AND ACETAMINOPHEN 500 MG: 500 TABLET ORAL at 08:11

## 2022-05-02 RX ADMIN — OXYCODONE HYDROCHLORIDE AND ACETAMINOPHEN 1 TABLET: 5; 325 TABLET ORAL at 09:59

## 2022-05-02 RX ADMIN — FLUOXETINE 20 MG: 20 CAPSULE ORAL at 08:11

## 2022-05-02 RX ADMIN — Medication 100 MG: at 08:11

## 2022-05-02 ASSESSMENT — PAIN DESCRIPTION - DESCRIPTORS
DESCRIPTORS: ACHING
DESCRIPTORS: ACHING
DESCRIPTORS: SORE

## 2022-05-02 ASSESSMENT — PAIN SCALES - GENERAL
PAINLEVEL_OUTOF10: 6
PAINLEVEL_OUTOF10: 0
PAINLEVEL_OUTOF10: 4
PAINLEVEL_OUTOF10: 4

## 2022-05-02 ASSESSMENT — PAIN DESCRIPTION - LOCATION
LOCATION: FOOT
LOCATION: ANKLE
LOCATION: FOOT

## 2022-05-02 ASSESSMENT — PAIN DESCRIPTION - ORIENTATION
ORIENTATION: RIGHT

## 2022-05-02 NOTE — CARE COORDINATION
5/2/22, 11:40 AM EDT    DISCHARGE PLANNING EVALUATION      Spoke with Tessy SIMPSON. Facility will review for possible admission to their ecf, as [address] [place] [address] does not think Larissa Sender is appropriate for return to assisted living at discharge. [address] [place] [address] is sharing that Larissa Sender has [facility], but mom shares that this was changed to traditional medicaid. She is unsure how or when this was changed. Mom has spoken with [address] [place] [address] about this, and is not in agreement with Larissa Sender going to a different facility. She shares that she does not understand why [address] [place] [address] will not take in their skilled unit. She is aware that [facility] may be able to consider, but is not ready to agree to that until she has satisfactory responses from [address] [place] [address]. Encouraged mom to speak with Ilir of [employer]. Waiting decision from [address] [place] [address].

## 2022-05-02 NOTE — PROGRESS NOTES
[address]  INPATIENT PHYSICAL THERAPY  DAILY NOTE  [facility] ORTHOPEDICS 7K - 7K-21/021-A    Time In: 1003  Time Out: 1032  Timed Code Treatment Minutes: 34 Minutes  Minutes: 29          Date: 2022  Patient Name: Mary Henry,  Gender:  female        MRN: [id number]  : 1970  (45 y.o.)     Referring Practitioner: BHASKAR Murillo  Diagnosis: trimalleolar fracture of R ankle  Additional Pertinent Hx: per EMR Sebastian Mari is a 45 y.o. female evaluated for trauma consult, brought by EMS following a mechanical with no LOC; past medical history short-term memory loss, Warnicke Korsakoff syndrome, A. fib, HTN, anxiety, major depressive disorder. Per report patient was walking with another individual when she fell on the grass and was unable to stand up and ambulate following incident. She states she has no pain, just that her right ankle feels \"weird\". Patient reports odd sensation in right ankle. Patient denies chest pain, shortness of breath, cough, headache, dizziness, lightheadedness, numbness, paraesthesias, weakness, chills, fevers, abdominal pain, nausea, vomiting, diarrhea, blood in stool, neck pain, or back pain. Care in coordination with trauma surgeon Dr. Jannet Bolaños. \" Pt is s/p ORIF Trimalleolar Ankle Fracture Right completed by Dr. Haylee Esteban on 22. Prior Level of Function:  Lives With: Alone  Type of Home: Assisted living  Home Layout: One level  Home Access: Level entry  Home Equipment: Grab bars   Bathroom Shower/Tub: Walk-in shower  Bathroom Toilet: Standard  Bathroom Equipment: Grab bars in shower    Receives Help From: Other (comment) (facility staff)  ADL Assistance: Independent  Homemaking Assistance: Needs assistance  Ambulation Assistance: Independent  Transfer Assistance: Independent  Active : No  Additional Comments: Pt reprots she is IND with all functional mobility prior with no AD. Pt IND with ADLs.  Pt in ADONAY due to \"loss of short term memory. \"    Restrictions/Precautions:  Restrictions/Precautions: General Precautions,Fall Risk,Weight Bearing  Right Lower Extremity Weight Bearing: Non Weight Bearing  Position Activity Restriction  Other position/activity restrictions: cast on R LE     SUBJECTIVE: RN approved session. Pt in recliner upon arrival and agrees to therapy. Pt pleasant and cooperative however emotional about d/c planning, extra time spent discussing POC/giving emotional support and listening ear. Pt expressing concerns/afraid that she cant go back to her AL facility and not wanting to start over somewhere else as she had made friends there. Reassurance given to pt that her stay elsewhere would be likely be temporary to work on strength and mobility to ensure safety prior to returning back to AL    PAIN: R foot and R 5th digit from toe rubbing against the cast. Pt reporting she had just taken pain medication prior to session    Vitals: Vitals not assessed per clinical judgement, see nursing flowsheet    OBJECTIVE:  Bed Mobility:  Supine to Sit: Stand By Assistance  Sit to Supine: Stand By Assistance   Scooting: Stand By Assistance    Transfers:  Sit to Stand: [employer], cues for hand placement  Stand to [place] [address], with increased time for completion, cues for hand placement, with verbal cues, impulsive to sit, decreased safety awareness, cues for hand placement and safe alignment to sirface and use of AD    Ambulation:  Contact Guard Assistance, with cues for safety, with verbal cues , with increased time for completion  Distance: 55ft  Surface: Level Tile  Device:Rolling Walker  Gait Deviations:   Forward Flexed Posture, Slow Enma, Decreased Step Length Bilaterally, Decreased Gait Speed, Decreased Heel Strike Bilaterally, Mild Path Deviations, Unsteady Gait, Decreased Terminal Knee Extension and cues to slow down for safety and for walker proximity, pt impulsive at times, moving quickly and unsteady    Exercise:  Patient was guided in 1 set(s) 10 reps of exercise to both lower extremities. Ankle pumps, Glut sets, Quad sets, Heelslides, Hip abduction/adduction and Straight leg raises. Exercises were completed for increased independence with functional mobility. Functional Outcome Measures: Completed  AM-PAC Inpatient Mobility Raw Score : 16  AM-PAC Inpatient T-Scale Score : 40.78    ASSESSMENT:  Assessment: Patient progressing toward established goals. Activity Tolerance:  Patient tolerance of  treatment: good. Pt demos decreased endurance, strength and independence with mobility     Equipment Recommendations:Equipment Needed: Yes  Mobility Devices: Theotis Kimberly: Rolling  Discharge Recommendations: [address] [place] [address] and Inpatient Therapy Stay  Plan: Specific Instructions for Next Treatment: gait, mobility, ther ex  Current Treatment Recommendations: Strengthening,Equipment evaluation, education, & procurement,Gait training,Balance training,Transfer training,Home exercise program,Endurance training,Patient/Caregiver education & training,Therapeutic activities,Safety education & training,Neuromuscular re-education  Plan:  (5x O)  Specific Instructions for Next Treatment: gait, mobility, ther ex    Patient Education  Patient Education: Plan of Care, Precautions/Restrictions, Bed Mobility, Transfers, Gait, Verbal Exercise Instruction    Goals:  Patient goals : \"get better, walk\"  Short Term Goals  Time Frame for Short term goals: by discharge  Short term goal 1: Pt will amb for >50 feet with S with no LOB with good demo of WB status and RW for support to progress towards PLOF. Short term goal 2: Pt will demo IND with transfers with RW for support with good demo of WB status with good safety to progress with mobility. Short term goal 3: Pt will demo IND with bed mobility tasks with bed flat to progress with mobility.   Short term goal 4: Pt will tolerate 10-20 reps of ther ex to increase overall mobility. Long Term Goals  Time Frame for Long term goals : NA due to short ELOS    Following session, patient left in safe position with all fall risk precautions in place.

## 2022-05-02 NOTE — PROGRESS NOTES
[facility]  Occupational Therapy  Daily Note  Time:   Time In: 3758  Time Out: 1505  Timed Code Treatment Minutes: 32 Minutes  Minutes: 31          Date: 2022  Patient Name: Ness Chamberlain,   Gender: female      Room: -/021-A  MRN: [id number]  : 1970  (45 y.o.)  Referring Practitioner: BHASKAR Rivas  Diagnosis: trimalleoar fracture of right ankle, closed, inital encounter  Additional Pertinent Hx: per EMR Lizette Roman is a 45 y.o. female evaluated for trauma consult, brought by EMS following a mechanical with no LOC; past medical history short-term memory loss, Warnicke Korsakoff syndrome, A. fib, HTN, anxiety, major depressive disorder. Per report patient was walking with another individual when she fell on the grass and was unable to stand up and ambulate following incident. She states she has no pain, just that her right ankle feels \"weird\". Patient reports odd sensation in right ankle. Patient denies chest pain, shortness of breath, cough, headache, dizziness, lightheadedness, numbness, paraesthesias, weakness, chills, fevers, abdominal pain, nausea, vomiting, diarrhea, blood in stool, neck pain, or back pain. Care in coordination with trauma surgeon Dr. Linda Sanchez. \" Pt is s/p ORIF Trimalleolar Ankle Fracture Right completed by Dr. Anna Zavala on 22. Restrictions/Precautions:  Restrictions/Precautions: General Precautions,Fall Risk,Weight Bearing  Right Lower Extremity Weight Bearing: Non Weight Bearing  Position Activity Restriction  Other position/activity restrictions: cast on R LE     SUBJECTIVE: RN okayed OT session. Pt. In room supine in bed agreeable to participate. PAIN: Pt. Reports irrability of cast itching and not comfortable, no pain reported    Vitals: Vitals not assessed per clinical judgement, see nursing flowsheet    COGNITION: Impaired Memory and Impulsive    ADL:   Grooming: Contact Guard Assistance. Bathing: Stand By Assistance. Pt. completed reshma  care while seated  Upper Extremity Dressing: Stand By Assistance. to don shirt  Lower Extremity Dressing: Stand By Assistance. to don shorts  [address] Toilet Transfer: Contact Guard Assistance. Bea Charlie BALANCE:  Sitting Balance:  Supervision. while seated on EOB  Standing Balance: Modified Independent, Contact Guard Assistance. BED MOBILITY:  Supine to Sit: Stand By Assistance    Sit to Supine: Stand By Assistance      TRANSFERS:  Sit to Stand:  [address] Stand to Sit: Contact Guard Assistance. FUNCTIONAL MOBILITY:  Assistive Device: Rolling Walker  Assist Level:  Contact Guard Assistance. Distance: To and from bathroom  Impulsive at times required vc's to slow down     ASSESSMENT:     Activity Tolerance:  Patient tolerance of  treatment: good      Discharge Recommendations: Continue to assess pending progress and Subacute/skilled nursing facility  Equipment Recommendations: Equipment Needed: Yes (RW)  Plan: Times per Week: 5x  Times per Day: Daily  Current Treatment Recommendations: Strengthening,Balance training,Functional mobility training,Endurance training,Patient/Caregiver education & training,Safety education & training,Self-Care / ADL    Patient Education  Patient Education: ADL's, Home Safety and Assistive Device Safety and safety with functional mobility    Goals  Short Term Goals  Time Frame for Short term goals: by discharge  Short Term Goal 1: pt will complete functional mobility to/from bathroom with maintaining NWB of RLE mod-I to access ADLs  Short Term Goal 2: pt will complete BADL routine with mod-I with maintaining NWB of RLE to increase indep with self care tasks  Short Term Goal 3: pt will complete sit<>stand transfers, including to/from toilet, mod-I to access toilet. Following session, patient left in safe position with all fall risk precautions in place.

## 2022-05-02 NOTE — PLAN OF CARE
Problem: Safety - Adult  Goal: Free from fall injury  Outcome: Progressing  Flowsheets (Taken 5/2/2022 1209)  Free From Fall Injury: Instruct family/caregiver on patient safety     Problem: Pain  Goal: Verbalizes/displays adequate comfort level or baseline comfort level  Outcome: Progressing  Flowsheets (Taken 5/2/2022 1209)  Verbalizes/displays adequate comfort level or baseline comfort level:   Encourage patient to monitor pain and request assistance   Assess pain using appropriate pain scale   Administer analgesics based on type and severity of pain and evaluate response   Implement non-pharmacological measures as appropriate and evaluate response   Consider cultural and social influences on pain and pain management   Notify Licensed Independent Practitioner if interventions unsuccessful or patient reports new pain     Problem: Cardiovascular - Adult  Goal: Maintains optimal cardiac output and hemodynamic stability  Outcome: Progressing  Flowsheets (Taken 5/2/2022 1209)  Maintains optimal cardiac output and hemodynamic stability:   Monitor blood pressure and heart rate   Assess for signs of decreased cardiac output     Problem: Skin/Tissue Integrity - Adult  Goal: Skin integrity remains intact  Outcome: Progressing  Flowsheets (Taken 5/2/2022 1209)  Skin Integrity Remains Intact: Monitor for areas of redness and/or skin breakdown     Problem: Musculoskeletal - Adult  Goal: Return mobility to safest level of function  Outcome: Progressing  Flowsheets (Taken 5/2/2022 1209)  Return Mobility to Safest Level of Function: Assess patient stability and activity tolerance for standing, transferring and ambulating with or without assistive devices     Problem: Gastrointestinal - Adult  Goal: Maintains or returns to baseline bowel function  Outcome: Progressing  Flowsheets (Taken 5/2/2022 1209)  Maintains or returns to baseline bowel function: Assess bowel function     Problem: Genitourinary - Adult  Goal: Absence of urinary retention  Outcome: Progressing  Flowsheets (Taken 5/2/2022 1209)  Absence of urinary retention: Assess patients ability to void and empty bladder     Problem: Discharge Planning  Goal: Discharge to home or other facility with appropriate resources  Outcome: Progressing  Flowsheets (Taken 5/2/2022 1209)  Discharge to home or other facility with appropriate resources: Identify barriers to discharge with patient and caregiver   Care plan reviewed with patient. Patient verbalizes understanding of the plan of care and contributes to goal setting.

## 2022-05-02 NOTE — PROGRESS NOTES
Patient transported via wheelchair to [employer] vehicle with all personal belongings.  Blue folder given to [employer]

## 2022-05-02 NOTE — PROGRESS NOTES
1600 Pt has new discharge order with [facility] and pt to return to [facility]. Called report to [facility] at [address] [facility]. Pt medicated with percocet per request. Faxed RX and AVS to [address] [facility]. Transport arranged to pick pt up between 6pm and 6:30pm tonight. Prior to pt discharge pt c/o \"face still red\" pt inquired how long will that last. Dr. Carson morrison x 1 po for her prior to discharge. Pt does not have any iv access. [id number] Transport here to take pt to [facility]. Paperwork given to transport.

## 2022-05-02 NOTE — PLAN OF CARE
Problem: Safety - Adult  Goal: Free from fall injury  Outcome: Progressing  Note: Educated on the use of call light    Care plan reviewed with patient and . Patient and verbalize understanding of the plan of care and contribute to goal setting.

## 2022-05-02 NOTE — CARE COORDINATION
5/2/22, 2:24 PM EDT    DISCHARGE PLANNING EVALUATION      [facility] of [address] [facility] assisted living will accept today, requesting home health

## 2022-05-02 NOTE — DISCHARGE SUMMARY
Physician Discharge Summary     Patient ID:  Annmarie Butler  [id number]    Physician: Dr. Caro George, CEDRIC    Admission date: 4/26/2022    Discharge date: 5/2/2022    Date of Surgery: 4/27/2022    Admission Diagnoses: Trimalleolar fracture of right ankle, closed, initial encounter [S82.851A]    Discharge Diagnoses: Same    Procedures:   1) ORIF Trimalleolar Ankle Fracture, Right    History, Physical Exam:   The patient is a 45 y.o. female with significant past medical history of Wernicke-Korsakoff syndrome, paroxysmal atrial fibrillation, major depressive disorder, and GERD who is seen on behalf of Dr. Dallas Ac and presents with complaints of right ankle pain. Patient has short term memory loss and secondary to this cannot provide much history of present illness. Per ER Physician and Resident, patient sustained a fall at a nursing facility earlier today. It did not appear that she sustained any other injuries. She was then brought to [facility] for further evaluation and management. At this point in time. Patient says that she does not have any pain in her right ankle. She does state that it is uncomfortable and is wondering if there is anything that we can do to make it feel better. She denies any pain elsewhere. Patient endorses sensation to her right foot. Patient denies any nausea, vomiting, fever, chills, chest pain, shortness of breath. No other pedal complaints. VASCULAR: Dorsalis pedis pulse palpable right foot, posterior tibial pulse nonpalpable secondary to edema. Pedal hair growth positive. Skin temperature is warm to warm from tibial tuberosity to distal digits. Cap fill time is within normal limits to all exposed digits of the right foot. Quality of skin is normal.  MUSCULOSKELETAL: Patient has obvious swelling about the right ankle. There is bony prominence along the medial aspect of the ankle. There is edema and ecchymosis in the area of the right ankle.   No pain on palpation of right ankle. No pain on palpation of proximal tibia or fibula or right foot. NEUROLOGIC: Protective sensation is absent to the right foot as evidenced by the lack of pain on palpation of the displaced right ankle fracture  SKIN: There are no open lesions    Discharge Physician: Dr. Ly Barahona, [address] [address] Course:   4/26/2022 - Presentation to [facility] ED with right trimalleolar ankle fracture, admission per trauma  4/27/2022 - Admission switched to podiatry, Dr. Ly Barahona, DPM. OR for ORIF Trimalleolar Ankle Fracture, right ankle.  Consult to Hospitalist for medical management and pre-operative surgical risk stratification  4/28/2022 - Evaluation per PT/OT  5[facility]2/2022 - [facility]    Discharged Condition: good    Disposition: [facility]ough[facility] of [address] [facility]    Patient Instructions:   Current Discharge Medication List      START taking these medications    Details   rivaroxaban (XARELTO) 10 MG TABS tablet Take 1 tablet by mouth daily (with breakfast)  Qty: 30 tablet, Refills: 0         CONTINUE these medications which have NOT CHANGED    Details   Nutritional Supplements (VITAMIN D BOOSTER PO) Take 1,000 mcg by mouth daily      diphenhydrAMINE (BENADRYL) 50 MG capsule Take 50 mg by mouth every 6 hours as needed for Itching      carbamide peroxide (DEBROX) 6.5 % otic solution Place 5 drops into both ears as needed      bisacodyl (DULCOLAX) 10 MG suppository Place 10 mg rectally as needed for Constipation      docusate sodium (COLACE) 100 MG capsule Take 100 mg by mouth 3 times daily as needed for Constipation      EPINEPHrine HCl, Anaphylaxis, (EPIPEN IM) Inject into the muscle as needed      hydrocortisone 1 % cream Apply topically 2 times daily Apply topically 2 times daily to face      aluminum & magnesium hydroxide-simethicone (MAALOX) 85841-18 MG/5ML SUSP suspension Take by mouth as needed for Indigestion      magnesium hydroxide (MILK OF MAGNESIA) 400 MG/5ML suspension Take 30 mLs by mouth as needed for Constipation      polyethylene glycol (MIRALAX) 17 g PACK packet Take 17 g by mouth as needed      Sennosides (SENOKOT PO) Take 2 tablets by mouth daily as needed      acetaminophen (TYLENOL) 325 MG tablet Take 650 mg by mouth every 6 hours as needed for Pain      FLUoxetine (PROZAC) 20 MG capsule Take 20 mg by mouth daily      metoprolol succinate (TOPROL XL) 50 MG extended release tablet Take 50 mg by mouth daily      QUEtiapine (SEROQUEL) 50 MG tablet Take 50 mg by mouth nightly       spironolactone (ALDACTONE) 25 MG tablet Take 12.5 mg by mouth daily      thiamine mononitrate 100 MG tablet Take 100 mg by mouth daily      LORazepam (ATIVAN) 1 MG tablet Take 1 mg by mouth every 12 hours as needed for Anxiety. EPINEPHrine (EPIPEN 2-LUZ) 0.3 MG/0.3ML SOAJ injection Inject 0.3 mLs into the muscle once for 1 dose Use as directed for allergic reaction  Qty: 2 each, Refills: 0      Ascorbic Acid (VITAMIN C) 500 MG CAPS Take 1 tablet by mouth daily           Activity: NWB RLE  Diet: cardiac diet  Wound Care: Keep cast on right leg clean, dry, and intact. Do not get cast wet. Cast changes to be performed at Dr. Yasmani Alaniz office at follow-up. Follow-up with  [facility] on May 10, 2022. Call the office for an appointment. Signed:  Vivianne Burkitt, DPM, D.P.M.   Podiatric Surgical Resident  5/2/2022  3:18 PM

## 2022-05-02 NOTE — PROGRESS NOTES
Podiatric Progress Note  Ralph Franco  Subjective :   5/2/2022  Patient seen at bedside this morning on behalf of Dr. Nadia Carvalho. Patient pleasant, and is alert and oriented to person, place, and time. Patient is 5 days status post ORIF of right ankle fracture (DOS 4/27/2022). Patient relates that her pain is well controlled with her current regimen. Patient states that she has had 2 bowel movements. Patient states that she is spoken with her mother and is somewhat aware of the situation regarding discharge. She does state that her face is flushed, and she thinks that this is a reaction anesthesia. She denies any pain or itchiness of her face. Patient denies any nausea, vomiting, fever, chills, chest pain, shortness of breath. No other pedal complaints. 5/1/2022  Patient seen at bedside this morning on behalf of Dr. Nadia Carvalho. Patient is pleasant, and is alert and oriented to person, place, and time. Patient is 4 days s/p ORIF of right ankle fracture (DOS 4/27/2022. Patient states that her pain has lessened to about 5/10, and is well managed with the current pain medication regimen. She also denies any N/V/F/C/SOB/CP or bilateral calf tenderness. Patient has no new pedal concerns at this time. Patient is aware that we are awaiting placement into an SNF/ECF, and that she will likely discharge on Monday, 5/2/2022.    4/30/2022  Patient seen at bedside this morning behalf of Dr. Nadia Carvalho. Patient is pleasant, and is alert and oriented to person, place, and time. Patient is 3 days status post ORIF of right ankle fracture (DOS 4/27/2022). Patient states she has 6/10 pain to the right ankle, but that the pain is tolerable with the current pain medication regimen. Patient currently denies any N/V/F/C/SOB/CP or bilateral calf tenderness. Patient has no other pedal complaints at this time.   Patient wanted to know why her discharge was held up, and where she will be discharging to.    4/28/2022  Patient seen at bedside today behalf of Dr. Haylee Esteban. Patient appeared pleasant, was oriented to person, place and time and in no acute distress. Patient is 1 day s/p ORIF of right ankle fracture. Patient currently endorses mild to moderate pain to her right lower leg. Patient denies any N/V/F/C/SOB or CP. Patient has no further pedal concerns at this point in time. Patient wanted to know when she could be discharged home. HISTORY OF PRESENT ILLNESS:    The patient is a 45 y.o. female with significant past medical history of Wernicke-Korsakoff syndrome, paroxysmal atrial fibrillation, major depressive disorder, and GERD who is seen on behalf of Dr. Patricia Mcintosh and presents with complaints of right ankle pain. Patient has short term memory loss and secondary to this cannot provide much history of present illness. Per ER Physician and Resident, patient sustained a fall at a nursing facility earlier today. It did not appear that she sustained any other injuries. She was then brought to [facility] for further evaluation and management. At this point in time. Patient says that she does not have any pain in her right ankle. She does state that it is uncomfortable and is wondering if there is anything that we can do to make it feel better. She denies any pain elsewhere. Patient endorses sensation to her right foot. Patient denies any nausea, vomiting, fever, chills, chest pain, shortness of breath. No other pedal complaints.     Current Medications:    Current Facility-Administered Medications: famotidine (PEPCID) tablet 20 mg, 20 mg, Oral, BID  oxyCODONE-acetaminophen (PERCOCET) 5-325 MG per tablet 1 tablet, 1 tablet, Oral, Q6H PRN **OR** oxyCODONE-acetaminophen (PERCOCET) 5-325 MG per tablet 2 tablet, 2 tablet, Oral, Q4H PRN  ascorbic acid (VITAMIN C) tablet 500 mg, 500 mg, Oral, Daily  FLUoxetine (PROZAC) capsule 20 mg, 20 mg, Oral, Daily  metoprolol succinate (TOPROL XL) extended release tablet 50 mg, 50 mg, Oral, Daily  QUEtiapine (SEROQUEL) tablet 50 mg, 50 mg, Oral, Nightly  spironolactone (ALDACTONE) tablet 12.5 mg, 12.5 mg, Oral, Daily  thiamine tablet 100 mg, 100 mg, Oral, Daily  sodium chloride flush 0.9 % injection 5-40 mL, 5-40 mL, IntraVENous, 2 times per day  sodium chloride flush 0.9 % injection 5-40 mL, 5-40 mL, IntraVENous, PRN  0.9 % sodium chloride infusion, 25 mL, IntraVENous, PRN  acetaminophen (TYLENOL) tablet 650 mg, 650 mg, Oral, Q4H PRN  ondansetron (ZOFRAN-ODT) disintegrating tablet 4 mg, 4 mg, Oral, Q8H PRN **OR** ondansetron (ZOFRAN) injection 4 mg, 4 mg, IntraVENous, Q6H PRN  polyethylene glycol (GLYCOLAX) packet 17 g, 17 g, Oral, Daily  bisacodyl (DULCOLAX) suppository 10 mg, 10 mg, Rectal, Daily  senna (SENOKOT) tablet 8.6 mg, 1 tablet, Oral, Daily PRN  fentaNYL (SUBLIMAZE) injection 25 mcg, 25 mcg, IntraVENous, Q1H PRN **OR** fentaNYL (SUBLIMAZE) injection 50 mcg, 50 mcg, IntraVENous, Q1H PRN    Objective     BP 94/67   Pulse 82   Temp 98.4 °F (36.9 °C) (Oral)   Resp 16   Ht 5' 8\" (1.727 m)   Wt 192 lb (87.1 kg)   LMP 05/01/2016   SpO2 97%   BMI 29.19 kg/m²      I/O:    Intake/Output Summary (Last 24 hours) at 5/2/2022 1412  Last data filed at 5/1/2022 1425  Gross per 24 hour   Intake 120 ml   Output    Net 120 ml              Wt Readings from Last 3 Encounters:   04/29/22 192 lb (87.1 kg)   11/06/20 202 lb 9.6 oz (91.9 kg)   10/07/19 172 lb (78 kg)       LABS:    No results for input(s): WBC, HGB, HCT, PLT in the last 72 hours. No results for input(s): NA, K, CL, CO2, PHOS, BUN, CREATININE, CA in the last 72 hours. No results for input(s): PROT, INR, APTT in the last 72 hours. No results for input(s): CKTOTAL, CKMB, CKMBINDEX, TROPONINI in the last 72 hours.     Focused Lower Extremity Examination:    Vitals:    BP 94/67   Pulse 82   Temp 98.4 °F (36.9 °C) (Oral)   Resp 16   Ht 5' 8\" (1.727 m)   Wt 192 lb (87.1 kg)   LMP 05/01/2016   SpO2 97%   BMI 29.19 kg/m²      Vascular: Skin temperature is warm to digits 1 through 5 of right foot. CFT less than 3 seconds to all 5 digits of right foot. Dermatologic: Below-knee cast is clean, dry, and intact to right lower leg. Nails 1-5 bilaterally are normotrophic. Webspaces 1-4 bilaterally are clean, dry and intact. Neurovascular:  Light touch sensation grossly intact in the saphenous, deep peroneal, superficial peroneal, sural, and tibial nerve distributions bilaterally. Musculoskeletal: Musculoskeletal exam limited due to presence of cast to right lower leg. Patient is able to wiggle toes of the right foot. No calf pain with wiggling of toes to the RLE.     ASSESSMENT: Pt. is a 45 y.o. female with:  Principle  S/p ORIF of right ankle    Chronic  Patient Active Problem List   Diagnosis    Sigmoid diverticulitis    Hypertension    Hyperlipemia    GERD (gastroesophageal reflux disease)    S/P gastric bypass    Anxiety    Alcohol withdrawal (HCC)    Alcoholism ([address]    Amnesia    Cardiomyopathy ([address]    Depression    Mood disorder, drug-induced ([address]    Organic mood disorder    Wernicke-Korsakoff syndrome (HCC)    Moderate episode of recurrent major depressive disorder ([address]    Trimalleolar fracture of right ankle, closed, initial encounter    Falls, initial encounter       PLAN:   1) S/p ORIF of right ankle  -Patient examined and evaluated  -Patient has no new pedal complaints at this time  -Pain to the right ankle appears to be improving; discussed with the patient that postop pain is worst 2-4 days after surgery, and that it starts to lessen thereafter.  -Right lower leg below-knee cast is clean, dry, and intact  -Physical and occupational therapy recommended subacute/skilled nursing facility and inpatient therapy stay,24-hour assistance or supervision respectively  -Discussed with patient that we are waiting on response from [facility] to see if she could return to the ECF side of the facility.  -Lovenox 40mg qd for DVT prophylaxis  -Patient verbalized understanding and agreement with treatment plan as stated. All of her questions were answered to her satisfaction.     2) Essential hypertension  -Hospitalist consulted, continue home medications  -Hospitalist signed off    3) Atrial Fibrillation  -Hospitalist consulted, continue beta-blocker  -Hospitalist signed off    4) Major depressive disorder  -Hospitalist consulted, continue home medications  -Hospitalist signed off    5) Hx of Wernicke-Korsakoff syndrome  -Hospitalist consulted, continue thiamine  -Hospitalist signed off    DISPO: Pending discharge planning    Shanelle Bradford DPM, PGY-2  Podiatric Surgical Resident  5/2/2022   2:12 PM

## 2022-05-02 NOTE — CARE COORDINATION
5/2/22, 3:39 PM EDT    Patient goals/plan/ treatment preferences discussed by  and . Patient goals/plan/ treatment preferences reviewed with patient/ family. Patient/ family verbalize understanding of discharge plan and are in agreement with goal/plan/treatment preferences. Understanding was demonstrated using the teach back method. AVS provided by RN at time of discharge, which includes all necessary medical information pertaining to the patients current course of illness, treatment, post-discharge goals of care, and treatment preferences. Services At/After Discharge: Assisted Living, Home Health and In ambulette             Pt is being discharged today back to [address] MARTIN updated pt and pts mom, Geofm Richard via phone. [facility] for transportation at 6 pm.  PROSPER Nicole to fax AVS and Scripts. MARTIN notified Timoteo Else with [facility].

## 2022-05-03 ENCOUNTER — TELEPHONE (OUTPATIENT)
Dept: FAMILY MEDICINE CLINIC | Age: 52
End: 2022-05-03

## 2022-05-03 NOTE — TELEPHONE ENCOUNTER
Patient discharged from [facility] 5/2/22- Trimalleolar fracture of right ankle

## 2023-11-13 LAB — PAP SMEAR, EXTERNAL: NORMAL

## 2023-12-06 ENCOUNTER — COMMUNITY OUTREACH (OUTPATIENT)
Dept: FAMILY MEDICINE CLINIC | Age: 53
End: 2023-12-06

## 2023-12-06 NOTE — PROGRESS NOTES
Patient's HM shows they are overdue for Mammogram, Colorectal Screening and Cervical Cancer Screening. Care Everywhere and  files searched. HM updated.

## (undated) DEVICE — PACK PROCEDURE SURG SET UP SRMC

## (undated) DEVICE — C-ARMOR C-ARM EQUIPMENT COVERS CLEAR STERILE UNIVERSAL FIT 12 PER CASE: Brand: C-ARMOR

## (undated) DEVICE — GAUZE,SPONGE,8"X4",12PLY,XRAY,STRL,LF: Brand: MEDLINE

## (undated) DEVICE — HYPODERMIC SAFETY NEEDLE: Brand: MAGELLAN

## (undated) DEVICE — DRAPE,EXTREMITY,89X128,STERILE: Brand: MEDLINE

## (undated) DEVICE — BIT DRILL SHORT QUICK CONN 2.7MM ST

## (undated) DEVICE — PACK-MAJOR

## (undated) DEVICE — SYRINGE IRRIG 60ML SFT PLIABLE BLB EZ TO GRP 1 HND USE W/

## (undated) DEVICE — SPONGE GZ W4XL4IN COT 12 PLY TYP VII WVN C FLD DSGN

## (undated) DEVICE — SYRINGE MED 10ML LUERLOCK TIP W/O SFTY DISP

## (undated) DEVICE — PENCIL SMK EVAC ALL IN 1 DSGN ENH VISIBILITY IMPROVED AIR

## (undated) DEVICE — BANDAGE,GAUZE,4.5"X4.1YD,STERILE,LF: Brand: MEDLINE

## (undated) DEVICE — DRAPE,U/SHT,SPLIT,FILM,60X84,STERILE: Brand: MEDLINE

## (undated) DEVICE — PERI-LOC VLP 2.7MM X 14MM                                    PROVISIONAL FIXATION PIN: Brand: PERI-LOC VLP

## (undated) DEVICE — Z DISCONTINUED BY MEDLINE USE 2711682 TRAY SKIN PREP DRY W/ PREM GLV

## (undated) DEVICE — BANDAGE COMPR E SGL LAYERED CLSR BGE W/ CLP W4INXL15FT

## (undated) DEVICE — SOLUTION IV IRRIG POUR BRL 0.9% SODIUM CHL 2F7124

## (undated) DEVICE — PREP SOL PVP IODINE 4%  4 OZ/BTL

## (undated) DEVICE — SUTURE VCRL SZ 3-0 L27IN ABSRB UD FS-2 L19MM 1/2 CIR J423H

## (undated) DEVICE — SUTURE PROL SZ 4-0 L18IN NONABSORBABLE BLU L19MM PS-2 3/8 8682G

## (undated) DEVICE — GLOVE ORANGE PI 8   MSG9080

## (undated) DEVICE — BASIC SINGLE BASIN BTC-LF: Brand: MEDLINE INDUSTRIES, INC.

## (undated) DEVICE — KIT INVISIKNOT ANKLE FRACTURE: Brand: INVISIKNOT

## (undated) DEVICE — SOLUTION SURG PREP POV IOD 7.5% 4 OZ

## (undated) DEVICE — IMPREGNATED GAUZE DRESSING: Brand: CUTICERIN 7.5X7.5CM CTN 50